# Patient Record
Sex: MALE | Race: WHITE | NOT HISPANIC OR LATINO | Employment: FULL TIME | URBAN - METROPOLITAN AREA
[De-identification: names, ages, dates, MRNs, and addresses within clinical notes are randomized per-mention and may not be internally consistent; named-entity substitution may affect disease eponyms.]

---

## 2017-01-23 ENCOUNTER — ALLSCRIPTS OFFICE VISIT (OUTPATIENT)
Dept: OTHER | Facility: OTHER | Age: 57
End: 2017-01-23

## 2017-02-28 ENCOUNTER — LAB CONVERSION - ENCOUNTER (OUTPATIENT)
Dept: OTHER | Facility: OTHER | Age: 57
End: 2017-02-28

## 2017-02-28 LAB
BILIRUB UR QL STRIP: NEGATIVE
COLOR UR: YELLOW
COMMENT (HISTORICAL): CLEAR
FECAL OCCULT BLOOD DIAGNOSTIC (HISTORICAL): NEGATIVE
GLUCOSE (HISTORICAL): NEGATIVE
HBA1C MFR BLD HPLC: 5.8 % OF TOTAL HGB
KETONES UR STRIP-MCNC: NEGATIVE MG/DL
LEUKOCYTE ESTERASE UR QL STRIP: NEGATIVE
NITRITE UR QL STRIP: NEGATIVE
PH UR STRIP.AUTO: 5.5 [PH] (ref 5–8)
PROT UR STRIP-MCNC: NEGATIVE MG/DL
SP GR UR STRIP.AUTO: 1.02 (ref 1–1.03)
TSH SERPL DL<=0.05 MIU/L-ACNC: 1.58 MIU/L (ref 0.4–4.5)

## 2017-05-17 ENCOUNTER — ALLSCRIPTS OFFICE VISIT (OUTPATIENT)
Dept: OTHER | Facility: OTHER | Age: 57
End: 2017-05-17

## 2017-06-02 DIAGNOSIS — E78.5 HYPERLIPIDEMIA: ICD-10-CM

## 2017-06-02 DIAGNOSIS — R05.9 COUGH: ICD-10-CM

## 2017-06-02 DIAGNOSIS — R73.09 OTHER ABNORMAL GLUCOSE: ICD-10-CM

## 2017-06-02 DIAGNOSIS — I25.10 ATHEROSCLEROTIC HEART DISEASE OF NATIVE CORONARY ARTERY WITHOUT ANGINA PECTORIS: ICD-10-CM

## 2017-06-05 ENCOUNTER — HOSPITAL ENCOUNTER (OUTPATIENT)
Dept: RADIOLOGY | Facility: MEDICAL CENTER | Age: 57
Discharge: HOME | End: 2017-06-05
Payer: COMMERCIAL

## 2017-06-05 ENCOUNTER — ALLSCRIPTS OFFICE VISIT (OUTPATIENT)
Dept: OTHER | Facility: OTHER | Age: 57
End: 2017-06-05

## 2017-06-05 DIAGNOSIS — R05.9 COUGH: ICD-10-CM

## 2017-06-05 PROCEDURE — 71020 HB CHEST X-RAY 2VW FRONTAL&LATL: CPT

## 2017-06-07 ENCOUNTER — GENERIC CONVERSION - ENCOUNTER (OUTPATIENT)
Dept: OTHER | Facility: OTHER | Age: 57
End: 2017-06-07

## 2017-06-26 ENCOUNTER — ALLSCRIPTS OFFICE VISIT (OUTPATIENT)
Dept: OTHER | Facility: OTHER | Age: 57
End: 2017-06-26

## 2017-08-07 ENCOUNTER — HOSPITAL ENCOUNTER (OUTPATIENT)
Dept: NON INVASIVE DIAGNOSTICS | Facility: CLINIC | Age: 57
Discharge: HOME | End: 2017-08-07
Payer: COMMERCIAL

## 2017-08-07 DIAGNOSIS — I25.10 ATHEROSCLEROTIC HEART DISEASE OF NATIVE CORONARY ARTERY WITHOUT ANGINA PECTORIS: ICD-10-CM

## 2017-08-07 DIAGNOSIS — R73.09 OTHER ABNORMAL GLUCOSE: ICD-10-CM

## 2017-08-07 PROCEDURE — 93350 STRESS TTE ONLY: CPT

## 2017-08-08 LAB
ARRHY DURING EX: NORMAL
CHEST PAIN STATEMENT: NORMAL
MAX DIASTOLIC BP: 80 MMHG
MAX HEART RATE: 141 BPM
MAX PREDICTED HEART RATE: 164 BPM
MAX. SYSTOLIC BP: 180 MMHG
PROTOCOL NAME: NORMAL
REASON FOR TERMINATION: NORMAL
TARGET HR FORMULA: NORMAL
TEST INDICATION: NORMAL
TIME IN EXERCISE PHASE: 353 S

## 2017-08-09 ENCOUNTER — TELEPHONE (OUTPATIENT)
Dept: INPATIENT UNIT | Facility: HOSPITAL | Age: 57
End: 2017-08-09

## 2017-08-09 PROBLEM — R07.9 CHEST PAIN: Chronic | Status: ACTIVE | Noted: 2017-08-09

## 2017-08-09 RX ORDER — ASPIRIN 81 MG/1
324 TABLET, CHEWABLE ORAL ONCE
Status: CANCELLED | OUTPATIENT
Start: 2017-08-09 | End: 2017-08-09

## 2017-08-09 RX ORDER — CLOPIDOGREL BISULFATE 75 MG/1
600 TABLET ORAL ONCE
Status: CANCELLED | OUTPATIENT
Start: 2017-08-09 | End: 2017-08-09

## 2017-08-09 RX ORDER — SODIUM CHLORIDE 9 MG/ML
125 INJECTION, SOLUTION INTRAVENOUS CONTINUOUS
Status: CANCELLED | OUTPATIENT
Start: 2017-08-09

## 2017-08-10 ENCOUNTER — TELEPHONE (OUTPATIENT)
Dept: NON INVASIVE DIAGNOSTICS | Facility: HOSPITAL | Age: 57
End: 2017-08-10

## 2017-08-10 ENCOUNTER — HOSPITAL ENCOUNTER (OUTPATIENT)
Dept: NON INVASIVE DIAGNOSTICS | Facility: HOSPITAL | Age: 57
Discharge: HOME | End: 2017-08-10
Attending: INTERNAL MEDICINE | Admitting: INTERNAL MEDICINE
Payer: COMMERCIAL

## 2017-08-10 VITALS
HEIGHT: 66 IN | SYSTOLIC BLOOD PRESSURE: 117 MMHG | BODY MASS INDEX: 30.53 KG/M2 | OXYGEN SATURATION: 97 % | RESPIRATION RATE: 18 BRPM | DIASTOLIC BLOOD PRESSURE: 63 MMHG | TEMPERATURE: 97.5 F | HEART RATE: 80 BPM | WEIGHT: 190 LBS

## 2017-08-10 DIAGNOSIS — I25.10 ATHEROSCLEROTIC HEART DISEASE OF NATIVE CORONARY ARTERY WITHOUT ANGINA PECTORIS: ICD-10-CM

## 2017-08-10 DIAGNOSIS — Z95.1 PRESENCE OF AORTOCORONARY BYPASS GRAFT: ICD-10-CM

## 2017-08-10 DIAGNOSIS — R94.39 OTHER NONSPECIFIC ABNORMAL CARDIOVASCULAR SYSTEM FUNCTION STUDY: ICD-10-CM

## 2017-08-10 DIAGNOSIS — I25.10 CAD IN NATIVE ARTERY: ICD-10-CM

## 2017-08-10 LAB
ANION GAP SERPL CALCULATED.3IONS-SCNC: 10 MMOL/L (ref 4–13)
ATRIAL RATE: 72 BPM
BUN SERPL-MCNC: 12 MG/DL (ref 5–25)
CALCIUM SERPL-MCNC: 9 MG/DL (ref 8.3–10.1)
CHLORIDE SERPL-SCNC: 106 MMOL/L (ref 100–108)
CHOLEST SERPL-MCNC: 191 MG/DL (ref 50–200)
CO2 SERPL-SCNC: 23 MMOL/L (ref 21–32)
CREAT SERPL-MCNC: 0.96 MG/DL (ref 0.6–1.3)
ERYTHROCYTE [DISTWIDTH] IN BLOOD BY AUTOMATED COUNT: 13.5 % (ref 11.6–15.1)
GFR SERPL CREATININE-BSD FRML MDRD: 88 ML/MIN/1.73SQ M
GLUCOSE P FAST SERPL-MCNC: 210 MG/DL (ref 65–99)
GLUCOSE SERPL-MCNC: 210 MG/DL (ref 65–140)
HCT VFR BLD AUTO: 39.9 % (ref 36.5–49.3)
HDLC SERPL-MCNC: 31 MG/DL (ref 40–60)
HGB BLD-MCNC: 14.1 G/DL (ref 12–17)
INR PPP: 1.03 (ref 0.86–1.16)
LDLC SERPL CALC-MCNC: 137 MG/DL (ref 0–100)
MAGNESIUM SERPL-MCNC: 1.9 MG/DL (ref 1.6–2.6)
MCH RBC QN AUTO: 28.7 PG (ref 26.8–34.3)
MCHC RBC AUTO-ENTMCNC: 35.3 G/DL (ref 31.4–37.4)
MCV RBC AUTO: 81 FL (ref 82–98)
P AXIS: 5 DEGREES
PLATELET # BLD AUTO: 245 THOUSANDS/UL (ref 149–390)
PMV BLD AUTO: 9.5 FL (ref 8.9–12.7)
POTASSIUM SERPL-SCNC: 4.2 MMOL/L (ref 3.5–5.3)
PR INTERVAL: 158 MS
PROTHROMBIN TIME: 13.5 SECONDS (ref 12.1–14.4)
QRS AXIS: 54 DEGREES
QRSD INTERVAL: 92 MS
QT INTERVAL: 420 MS
QTC INTERVAL: 459 MS
RBC # BLD AUTO: 4.91 MILLION/UL (ref 3.88–5.62)
SODIUM SERPL-SCNC: 139 MMOL/L (ref 136–145)
T WAVE AXIS: 101 DEGREES
TRIGL SERPL-MCNC: 114 MG/DL
VENTRICULAR RATE: 72 BPM
WBC # BLD AUTO: 6.8 THOUSAND/UL (ref 4.31–10.16)

## 2017-08-10 PROCEDURE — 99153 MOD SED SAME PHYS/QHP EA: CPT | Performed by: INTERNAL MEDICINE

## 2017-08-10 PROCEDURE — 83735 ASSAY OF MAGNESIUM: CPT | Performed by: INTERNAL MEDICINE

## 2017-08-10 PROCEDURE — C1894 INTRO/SHEATH, NON-LASER: HCPCS | Performed by: INTERNAL MEDICINE

## 2017-08-10 PROCEDURE — 80048 BASIC METABOLIC PNL TOTAL CA: CPT | Performed by: INTERNAL MEDICINE

## 2017-08-10 PROCEDURE — C1760 CLOSURE DEV, VASC: HCPCS | Performed by: INTERNAL MEDICINE

## 2017-08-10 PROCEDURE — 99152 MOD SED SAME PHYS/QHP 5/>YRS: CPT | Performed by: INTERNAL MEDICINE

## 2017-08-10 PROCEDURE — C1769 GUIDE WIRE: HCPCS | Performed by: INTERNAL MEDICINE

## 2017-08-10 PROCEDURE — 85027 COMPLETE CBC AUTOMATED: CPT | Performed by: INTERNAL MEDICINE

## 2017-08-10 PROCEDURE — 93005 ELECTROCARDIOGRAM TRACING: CPT | Performed by: INTERNAL MEDICINE

## 2017-08-10 PROCEDURE — 93455 CORONARY ART/GRFT ANGIO S&I: CPT | Performed by: INTERNAL MEDICINE

## 2017-08-10 PROCEDURE — 85610 PROTHROMBIN TIME: CPT | Performed by: INTERNAL MEDICINE

## 2017-08-10 PROCEDURE — 80061 LIPID PANEL: CPT | Performed by: INTERNAL MEDICINE

## 2017-08-10 RX ORDER — FENTANYL CITRATE 50 UG/ML
INJECTION, SOLUTION INTRAMUSCULAR; INTRAVENOUS CODE/TRAUMA/SEDATION MEDICATION
Status: COMPLETED | OUTPATIENT
Start: 2017-08-10 | End: 2017-08-10

## 2017-08-10 RX ORDER — SODIUM CHLORIDE 9 MG/ML
125 INJECTION, SOLUTION INTRAVENOUS CONTINUOUS
Status: DISCONTINUED | OUTPATIENT
Start: 2017-08-10 | End: 2017-08-10

## 2017-08-10 RX ORDER — CLOPIDOGREL BISULFATE 75 MG/1
600 TABLET ORAL ONCE
Status: COMPLETED | OUTPATIENT
Start: 2017-08-10 | End: 2017-08-10

## 2017-08-10 RX ORDER — LIDOCAINE HYDROCHLORIDE 10 MG/ML
INJECTION, SOLUTION INFILTRATION; PERINEURAL CODE/TRAUMA/SEDATION MEDICATION
Status: COMPLETED | OUTPATIENT
Start: 2017-08-10 | End: 2017-08-10

## 2017-08-10 RX ORDER — ISOSORBIDE MONONITRATE 30 MG/1
30 TABLET, EXTENDED RELEASE ORAL DAILY
COMMUNITY
End: 2018-09-24 | Stop reason: SDUPTHER

## 2017-08-10 RX ORDER — ASPIRIN 81 MG/1
81 TABLET, CHEWABLE ORAL DAILY
COMMUNITY

## 2017-08-10 RX ORDER — FENOFIBRATE 145 MG/1
145 TABLET, COATED ORAL DAILY
COMMUNITY
End: 2018-08-29 | Stop reason: SDUPTHER

## 2017-08-10 RX ORDER — METOPROLOL SUCCINATE 25 MG/1
25 TABLET, EXTENDED RELEASE ORAL DAILY
COMMUNITY
End: 2018-02-15

## 2017-08-10 RX ORDER — SODIUM CHLORIDE 9 MG/ML
125 INJECTION, SOLUTION INTRAVENOUS CONTINUOUS
Status: DISPENSED | OUTPATIENT
Start: 2017-08-10 | End: 2017-08-10

## 2017-08-10 RX ORDER — MIDAZOLAM HYDROCHLORIDE 1 MG/ML
INJECTION INTRAMUSCULAR; INTRAVENOUS CODE/TRAUMA/SEDATION MEDICATION
Status: COMPLETED | OUTPATIENT
Start: 2017-08-10 | End: 2017-08-10

## 2017-08-10 RX ORDER — ASPIRIN 81 MG/1
324 TABLET, CHEWABLE ORAL ONCE
Status: COMPLETED | OUTPATIENT
Start: 2017-08-10 | End: 2017-08-10

## 2017-08-10 RX ADMIN — FENTANYL CITRATE 50 MCG: 50 INJECTION, SOLUTION INTRAMUSCULAR; INTRAVENOUS at 12:49

## 2017-08-10 RX ADMIN — FENTANYL CITRATE 25 MCG: 50 INJECTION, SOLUTION INTRAMUSCULAR; INTRAVENOUS at 13:17

## 2017-08-10 RX ADMIN — MIDAZOLAM 2 MG: 1 INJECTION INTRAMUSCULAR; INTRAVENOUS at 12:49

## 2017-08-10 RX ADMIN — FENTANYL CITRATE 50 MCG: 50 INJECTION, SOLUTION INTRAMUSCULAR; INTRAVENOUS at 12:54

## 2017-08-10 RX ADMIN — MIDAZOLAM 2 MG: 1 INJECTION INTRAMUSCULAR; INTRAVENOUS at 12:44

## 2017-08-10 RX ADMIN — SODIUM CHLORIDE 125 ML/HR: 0.9 INJECTION, SOLUTION INTRAVENOUS at 09:07

## 2017-08-10 RX ADMIN — MIDAZOLAM 1 MG: 1 INJECTION INTRAMUSCULAR; INTRAVENOUS at 13:17

## 2017-08-10 RX ADMIN — FENTANYL CITRATE 50 MCG: 50 INJECTION, SOLUTION INTRAMUSCULAR; INTRAVENOUS at 12:44

## 2017-08-10 RX ADMIN — IOHEXOL 110 ML: 350 INJECTION, SOLUTION INTRAVENOUS at 13:35

## 2017-08-10 RX ADMIN — CLOPIDOGREL BISULFATE 600 MG: 75 TABLET ORAL at 09:39

## 2017-08-10 RX ADMIN — ASPIRIN 81 MG 324 MG: 81 TABLET ORAL at 09:39

## 2017-08-10 RX ADMIN — LIDOCAINE HYDROCHLORIDE 10 ML: 10 INJECTION, SOLUTION INFILTRATION; PERINEURAL at 12:51

## 2017-08-10 RX ADMIN — MIDAZOLAM 2 MG: 1 INJECTION INTRAMUSCULAR; INTRAVENOUS at 12:54

## 2017-08-10 RX ADMIN — FENTANYL CITRATE 50 MCG: 50 INJECTION, SOLUTION INTRAMUSCULAR; INTRAVENOUS at 13:30

## 2017-08-14 ENCOUNTER — ALLSCRIPTS OFFICE VISIT (OUTPATIENT)
Dept: OTHER | Facility: OTHER | Age: 57
End: 2017-08-14

## 2017-08-21 ENCOUNTER — ALLSCRIPTS OFFICE VISIT (OUTPATIENT)
Dept: OTHER | Facility: OTHER | Age: 57
End: 2017-08-21

## 2017-08-29 ENCOUNTER — GENERIC CONVERSION - ENCOUNTER (OUTPATIENT)
Dept: OTHER | Facility: OTHER | Age: 57
End: 2017-08-29

## 2017-09-11 ENCOUNTER — GENERIC CONVERSION - ENCOUNTER (OUTPATIENT)
Dept: OTHER | Facility: OTHER | Age: 57
End: 2017-09-11

## 2017-10-02 DIAGNOSIS — Z12.5 ENCOUNTER FOR SCREENING FOR MALIGNANT NEOPLASM OF PROSTATE: ICD-10-CM

## 2017-10-06 ENCOUNTER — ALLSCRIPTS OFFICE VISIT (OUTPATIENT)
Dept: OTHER | Facility: OTHER | Age: 57
End: 2017-10-06

## 2017-10-07 NOTE — PROGRESS NOTES
Assessment  1  Anxiety (300 00) (F41 9)  2  Depression, major, recurrent, mild (296 31) (F33 0)  3  Insomnia (780 52) (G47 00)    Plan   Anxiety    · Start: LORazepam 0 5 MG Oral Tablet; take 1 tablet daily as needed  Insomnia    · Start: TraZODone HCl - 50 MG Oral Tablet; TAKE 1/2 TABLET AT BEDTIME INCREASE TO  1 TABLET AS NEEDED    Follow-up visit in 6 weeks Evaluation and Treatment  Follow-up  Status: Hold For - Scheduling  Requested for: 02FTS1758  Ordered; For: Insomnia;  Ordered By: Tatiana Morrissey  Performed:   Due: 17VMD1703     Discussion/Summary    Aniety not at [address]  pt to continue escitalopram 10 mg will add prn lorazapam 0 5 mg prn  pt has insomnia will add trazadone  Possible side effects of new medications were reviewed with the patient/guardian today  The treatment plan was reviewed with the patient/guardian  The patient/guardian understands and agrees with the treatment plan      Chief Complaint  follow up for depression/anxiety      History of Present Illness  pt presents for follow up anxiety and depression  pt was started on escitalopram last visit  pt stes he noticed little difference  pt has medical problems  pt has financial woes  pt slo has insomnia  pt works midnight shift so his sleep is disrupted  pt declines vaccines today  pt did not do labs  pt staes cardiology prescribed lorazapam prn but he did not get it  discussed adding trazadone hs to hyelp sleep nad depression  we can alos use lorazapam prm  The patient is being seen for follow-up of generalized anxiety disorder  The patient reports no change in the condition  Comorbid Illnesses: depression  He has had no significant interval events  Interval symptoms:  stable anxiety,-denies panic attacks,-worsened sleep disruption-and-improved depression  Associated symptoms: no hallucinations-and-no suicidal ideation  Medication(s): selective serotonin reuptake inhibitors     Medications:  the patient is adherent to his medication regimen, but-he denies medication side effects  Review of Systems    Constitutional: recent 4 lb weight gain  Cardiovascular: no chest pain-and-no extremity edema  Respiratory: no shortness of breath  Psychiatric: anxiety-and-sleep disturbances, but-not suicidal       Active Problems  1  Abnormal glucose (790 29) (R73 09)  2  Abnormal stress test (794 39) (R94 39)  3  Anxiety (300 00) (F41 9)  4  Back pain (724 5) (M54 9)  5  CAD in native artery (414 01) (I25 10)  6  Depression, major, recurrent, mild (296 31) (F33 0)  7  Encounter for prostate cancer screening (V76 44) (Z12 5)  8  Encounter for screening colonoscopy (V76 51) (Z12 11)  9  Esophageal reflux (530 81) (K21 9)  10  Hip pain, chronic, right (719 45,338 29) (M25 551,G89 29)  11  Hyperesthesia (782 0) (R20 3)  12  Hyperlipidemia (272 4) (E78 5)  13  Insomnia (780 52) (G47 00)  14  Keloid scar (701 4) (L91 0)  15  Multiple nevi (216 9) (D22 9)  16  Neck pain (723 1) (M54 2)  17  Obesity (278 00) (E66 9)  18  Restless legs syndrome (333 94) (G25 81)  19  S/P CABG (coronary artery bypass graft) (V45 81) (Z95 1)  20  Screening for skin condition (V82 0) (Z13 89)  21  Seborrheic keratosis (702 19) (L82 1)  22  Shoulder joint pain, unspecified laterality    Past Medical History  1  History of Anxiety (300 00) (F41 9)  2  History of Chest pain (786 50) (R07 9)  3  History of Depression (311) (F32 9)  4  Family history of acute myocardial infarction (V17 3) (Z82 49)  5  Family history of coronary artery disease (V17 3) (Z82 49)  6  History of nephrolithiasis (V13 01) (Z87 442)  7  History of rotator cuff tear (V13 59) (Z87 39)  8  Denied: History of substance abuse  9  History of urinary stone (V13 01) (Z87 442)  10  History of Subdural hemorrhage-concussion (852 29) (G38 0Z0I)    The active problems and past medical history were reviewed and updated today  Surgical History  1  History of Arthroscopy Shoulder  2  History of Brain Surgery  3  History of CABG  4  History of Cardiac Cath Procedure Outcome: Successful  5  History of Hernia Repair  6  History of Inguinal Hernia Repair  7  History of Surgery Vas Deferens Vasectomy  8  History of Tonsillectomy    Family History  Mother   1  Family history of depression (V17 0) (Z81 8)  2  Family history of diabetes mellitus (V18 0) (Z83 3)  3  Denied: Family history of substance abuse  Father   4  Family history of other lymphatic and hematopoietic neoplasms (V16 7) (Z80 7)  5  Family history of stroke (V17 1) (Z82 3)  Family History   6  Family history of Cancer  7  Family history of arthritis (V17 7) (Z82 61)  8  Family history of hypertension (V17 49) (Z82 49)    Social History   · Being A Social Drinker   · Caffeine Use   · Former smoker (A84 60) (S46 773)  The social history was reviewed and updated today  The social history was reviewed and is unchanged  Current Meds  1  AmLODIPine Besylate 5 MG Oral Tablet; TAKE 1 TABLET DAILY; Therapy: 14EEX4827 to (Evaluate:46Hxp2034)  Requested for: 40ZWS2470; Last   Rx:85Uio7677 Ordered  2  Aspirin 81 MG TABS; Take 1 tablet daily; Therapy: (Recorded:08Apr2016) to Recorded  3  Clopidogrel Bisulfate 75 MG Oral Tablet; TAKE 1 TABLET DAILY; Therapy: 76OLO4841 to (Evaluate:76Ntg8703)  Requested for: 43Sds1622; Last   Rx:14Aug2017 Ordered  4  CoQ-10 & Fish Oil CAPS; Therapy: (Recorded:08Apr2016) to Recorded  5  Escitalopram Oxalate 10 MG Oral Tablet; TAKE 1 TABLET BY MOUTH EVERY DAY; Therapy: 45Djz9972 to (Becki Never)  Requested for: 88Dbn0111; Last   Rx:19Wnb4016 Ordered  6  Fenofibrate 145 MG Oral Tablet; take 1 tablet every day; Therapy: 80Bhp7263 to (Evaluate:30Nov2017)  Requested for: 27Hof8747; Last   Rx:42Wmq2862 Ordered  7  Fish Oil CAPS; TAKE 1 CAPSULE Daily @ 8am Recorded  8  Isosorbide Mononitrate ER 30 MG Oral Tablet Extended Release 24 Hour; Take 1 tablet   once daily;    Therapy: 38YJS2820 to (Evaluate:09Hyq2010)  Requested for: 43GXZ7862; Last   Rx:90Cfg0758 Ordered  9  Metoprolol Succinate  MG Oral Tablet Extended Release 24 Hour; Take 1 tablet   daily; Therapy: 40GQW0489 to (Last Rx:08Edz9358)  Requested for: 14Aug2017 Ordered  10  Nitroglycerin 0 4 MG Sublingual Tablet Sublingual; TAKE AS DIRECTED; Therapy: 90ZFN5784 to (Last Rx:55Dza5334)  Requested for: 96Nuz5695 Ordered  11  Pravastatin Sodium 20 MG Oral Tablet; take 1 tablet every day; Therapy: 22VMP4312 to (Last Rx:16Mef3058)  Requested for: 14Aug2017 Ordered  12  Vitamin B Complex Oral Tablet; Therapy: (Recorded:80Wlq1336) to Recorded  13  Vitamin E 400 UNIT Oral Capsule; Therapy: (Recorded:46Ydb9215) to Recorded    The medication list was reviewed and updated today  Allergies  1  Contrast Media Ready-Box MISC  2  Meloxicam TABS    Vitals  Vital Signs    Recorded: 16YQP9741 08:09AM   Temperature 97 3 F, Tympanic   Heart Rate 53, L Brachial Artery   Pulse Quality Normal, L Brachial Artery   Respiration Quality Normal   Respiration 16   Systolic 261, LUE, Sitting   Diastolic 82, LUE, Sitting   Height 5 ft 5 5 in   Weight 192 lb 3 2 oz   BMI Calculated 31 5   BSA Calculated 1 96   O2 Saturation 97     Physical Exam    Constitutional   General appearance: No acute distress, well appearing and well nourished  appears healthy-and-obese  Head and Face   Head and face: Normal     Eyes   Conjunctiva and lids: No erythema, swelling or discharge  Ears, Nose, Mouth, and Throat   External inspection of ears and nose: Normal     Oropharynx: Normal with no erythema, edema, exudate or lesions  Neck   Neck: Supple, symmetric, trachea midline, no masses  Pulmonary   Respiratory effort: No increased work of breathing or signs of respiratory distress  Auscultation of lungs: Clear to auscultation  Cardiovascular   Auscultation of heart: Normal rate and rhythm, normal S1 and S2, no murmurs      Examination of extremities for edema and/or varicosities: Normal  Lymphatic   Palpation of lymph nodes in neck: No lymphadenopathy      Psychiatric   Judgment and insight: Normal     Mood and affect: Normal        Future Appointments    Date/Time Provider Specialty Site   11/17/2017 08:30 AM Miguel Rothman [facility]     Signatures   Electronically signed by : Sarah Bliss [facility]; Oct  6 2017  8:30AM EST                       (Author)    Electronically signed by : Shey Campuzano DO; Oct  6 2017 12:28PM EST                       (Author)    Electronically signed by : Shey Campuzano DO; Oct  6 2017 12:29PM EST                       (Author)

## 2017-10-13 ENCOUNTER — GENERIC CONVERSION - ENCOUNTER (OUTPATIENT)
Dept: OTHER | Facility: OTHER | Age: 57
End: 2017-10-13

## 2017-10-13 ENCOUNTER — LAB CONVERSION - ENCOUNTER (OUTPATIENT)
Dept: OTHER | Facility: OTHER | Age: 57
End: 2017-10-13

## 2017-10-13 LAB
A/G RATIO (HISTORICAL): 2 (CALC) (ref 1–2.5)
ALBUMIN SERPL BCP-MCNC: 4.5 G/DL (ref 3.6–5.1)
ALP SERPL-CCNC: 49 U/L (ref 40–115)
ALT SERPL W P-5'-P-CCNC: 22 U/L (ref 9–46)
AST SERPL W P-5'-P-CCNC: 26 U/L (ref 10–35)
BILIRUB SERPL-MCNC: 0.9 MG/DL (ref 0.2–1.2)
BUN SERPL-MCNC: 18 MG/DL (ref 7–25)
BUN/CREA RATIO (HISTORICAL): NORMAL (CALC) (ref 6–22)
CALCIUM SERPL-MCNC: 9.6 MG/DL (ref 8.6–10.3)
CHLORIDE SERPL-SCNC: 105 MMOL/L (ref 98–110)
CHOLEST SERPL-MCNC: 133 MG/DL
CHOLEST/HDLC SERPL: 11.1 (CALC)
CO2 SERPL-SCNC: 26 MMOL/L (ref 20–31)
CREAT SERPL-MCNC: 0.89 MG/DL (ref 0.7–1.33)
EGFR AFRICAN AMERICAN (HISTORICAL): 111 ML/MIN/1.73M2
EGFR-AMERICAN CALC (HISTORICAL): 96 ML/MIN/1.73M2
GAMMA GLOBULIN (HISTORICAL): 2.3 G/DL (CALC) (ref 1.9–3.7)
GLUCOSE (HISTORICAL): 94 MG/DL (ref 65–99)
HBA1C MFR BLD HPLC: 5.5 % OF TOTAL HGB
HDLC SERPL-MCNC: 12 MG/DL
LDL CHOLESTEROL (HISTORICAL): 90 MG/DL (CALC)
NON-HDL-CHOL (CHOL-HDL) (HISTORICAL): 121 MG/DL (CALC)
POTASSIUM SERPL-SCNC: 4 MMOL/L (ref 3.5–5.3)
PROSTATE SPECIFIC ANTIGEN TOTAL (HISTORICAL): 0.2 NG/ML
SODIUM SERPL-SCNC: 140 MMOL/L (ref 135–146)
TOTAL PROTEIN (HISTORICAL): 6.8 G/DL (ref 6.1–8.1)
TRIGL SERPL-MCNC: 226 MG/DL

## 2017-11-17 ENCOUNTER — ALLSCRIPTS OFFICE VISIT (OUTPATIENT)
Dept: OTHER | Facility: OTHER | Age: 57
End: 2017-11-17

## 2017-11-18 NOTE — PROGRESS NOTES
Assessment    1  CAD in native artery (414 01) (I25 10)  2  Anxiety (300 00) (F41 9)  3  Depression, major, recurrent, mild (296 31) (F33 0)  4  Insomnia (780 52) (G47 00)  5  Hyperlipidemia (272 4) (E78 5)  6  Low HDL (under 40) (272 5) (E78 6)    Plan  CAD in native artery    · Renew: AmLODIPine Besylate 5 MG Oral Tablet; TAKE 1 TABLET DAILY  CAD in native artery, Hyperlipidemia    · Begin or continue regular aerobic exercise  Gradually work up to at least {count1}sessions of {dur1} of exercise a week ; Status:Complete;   Done: 43VWB1690   · Eat a low fat and low cholesterol diet ; Status:Complete;   Done: 41JDZ9351   · We recommend that you bring your body mass index down to 26 ; Status:Complete;  Done: 17VWD1002   · Follow-up visit in 3 months Evaluation and Treatment  Follow-up  Status: Complete Done: 11UOM8267  Flu vaccine need    · Administered: Fluzone Quadrivalent 0 5 ML Intramuscular Suspension Prefilled Syringe  Hyperlipidemia    · Renew: Pravastatin Sodium 20 MG Oral Tablet (Pravachol); take 1 tablet every day  Insomnia    · Renew: TraZODone HCl - 50 MG Oral Tablet; TAKE 1/2 TABLET BY MOUTH AT BEDTIME,INCREASE TO 1 TABLET AS NEEDED  Need for pneumococcal vaccination    · Administered: Pneumovax 23 25 MCG/0 5ML Injection Injectable    Discussion/Summary    Hypertension at goal lipids not at goal patient has low HDL of 12 triglycerides elevated at 226 patient is on statin and fibrate patient to add niacin non flush formula over-the-counter 1-2 per day patient to follow up with cardiologist for known coronary artery disease  Patient has not had any angina  Depression greatly improved with the addition of trazodone at bedtime patient rarely has to use lorazepam for anxiety patient to continue escitalopram  Flu vaccine and Pneumovax 23 given today  Possible side effects of new medications were reviewed with the patient/guardian today  The treatment plan was reviewed with the patient/guardian   The patient/guardian understands and agrees with the treatment plan      Chief Complaint  follow up med check   Patient is here today for follow up of chronic conditions described in HPI  History of Present Illness  pt presents for follow up chronic conditons  pt has cad  he is comanaged with cardiology  pt had cabg and stents  pt on multiple meds  pt has depression recurrent  he is on escitalopram and prn lorazaopam  pt stes the trazadone added lst vist has helped immensely  pt sleeping well on trazadone 25 mg hs  pt rarely takes lorazapam  labs reviewed with pt  psa is normal  cmp nornmal  hba1c is 5 5 lipds- hdl is 12 triglycerides a re down to 226  The patient states his depression has improved since the last visit  They have had recurrent episodes of major depression  He has no comorbid illnesses  He has had no significant interval events  Interval Symptoms: improved depression,-- improved depressed mood,-- improved loss of interest or pleasure in activities,-- improved insomnia,-- denies excessive sleepiness,-- denies inability to perform normal activities-- and-- denies anxiety  Associated symptoms include: employment difficulties, but-- no thoughts of suicide,-- no social difficulties,-- no auditory hallucinations-- and-- no visual hallucinations  Medications: Medication(s): a SSRI-- and-- trazadone  Review of Systems   Constitutional: no recent weight gain  Eyes: no eyesight problems  Cardiovascular: no chest pain-- and-- no extremity edema  Respiratory: no cough  Gastrointestinal: no abdominal pain,-- no constipation-- and-- no diarrhea  Genitourinary: no dysuria  Integumentary: no rashes  Neurological: no headache  Active Problems  1  Abnormal stress test (794 39) (R94 39)  2  Anxiety (300 00) (F41 9)  3  Back pain (724 5) (M54 9)  4  CAD in native artery (414 01) (I25 10)  5  Depression, major, recurrent, mild (296 31) (F33 0)  6   Encounter for prostate cancer screening (Y94 59) (Z12 5)  7  Encounter for screening colonoscopy (V76 51) (Z12 11)  8  Esophageal reflux (530 81) (K21 9)  9  Hip pain, chronic, right (719 45,338 29) (M25 551,G89 29)  10  Hyperesthesia (782 0) (R20 3)  11  Hyperlipidemia (272 4) (E78 5)  12  Insomnia (780 52) (G47 00)  13  Keloid scar (701 4) (L91 0)  14  Multiple nevi (216 9) (D22 9)  15  Neck pain (723 1) (M54 2)  16  Obesity (278 00) (E66 9)  17  Obstructive sleep apnea (327 23) (G47 33)  18  Restless legs syndrome (333 94) (G25 81)  19  S/P CABG (coronary artery bypass graft) (V45 81) (Z95 1)  20  Screening for skin condition (V82 0) (Z13 89)  21  Seborrheic keratosis (702 19) (L82 1)  22  Shoulder joint pain, unspecified laterality    Past Medical History  1  History of Anxiety (300 00) (F41 9)  2  History of Chest pain (786 50) (R07 9)  3  History of Depression (311) (F32 9)  4  Family history of acute myocardial infarction (V17 3) (Z82 49)  5  Family history of coronary artery disease (V17 3) (Z82 49)  6  History of nephrolithiasis (V13 01) (Z87 442)  7  History of rotator cuff tear (V13 59) (Z87 39)  8  Denied: History of substance abuse  9  History of urinary stone (V13 01) (Z87 442)  10  History of Subdural hemorrhage-concussion (852 29) (C91 3V7D)    The active problems and past medical history were reviewed and updated today  Surgical History  1  History of Arthroscopy Shoulder  2  History of Brain Surgery  3  History of CABG  4  History of Cardiac Cath Procedure Outcome: Successful  5  History of Hernia Repair  6  History of Inguinal Hernia Repair  7  History of Surgery Vas Deferens Vasectomy  8  History of Tonsillectomy    Family History  Mother   1  Family history of depression (V17 0) (Z81 8)  2  Family history of diabetes mellitus (V18 0) (Z83 3)  3  Denied: Family history of substance abuse  Father   4  Family history of other lymphatic and hematopoietic neoplasms (V16 7) (Z80 7)  5   Family history of stroke (V17 1) (Z82 3)  Family History 6  Family history of Cancer  7  Family history of arthritis (V17 7) (Z82 61)  8  Family history of hypertension (V17 49) (Z82 49)    Social History     · Being A Social Drinker   · Caffeine Use   · Former smoker (D71 25) (T20 432)  The social history was reviewed and updated today  The social history was reviewed and is unchanged  Current Meds  1  AmLODIPine Besylate 5 MG Oral Tablet; TAKE 1 TABLET DAILY; Therapy: 46KTP5798 to (Evaluate:31Xvp6532)  Requested for: 51GMX7477; Last Rx:73Scg1414 Ordered  2  Aspirin 81 MG TABS; Take 1 tablet daily; Therapy: (Recorded:08Apr2016) to Recorded  3  Clopidogrel Bisulfate 75 MG Oral Tablet; TAKE 1 TABLET DAILY; Therapy: 04JCA7438 to (Skipper Pro)  Requested for: 31SKA5290; Last Rx:09Nov2017 Ordered  4  CoQ-10 & Fish Oil CAPS; Therapy: (Recorded:08Apr2016) to Recorded  5  Escitalopram Oxalate 10 MG Oral Tablet; TAKE 1 TABLET BY MOUTH EVERY DAY; Therapy: 29Stk7924 to (Sebastian Boston)  Requested for: 59AMY7878; Last Rx:07Nov2017 Ordered  6  Fenofibrate 145 MG Oral Tablet; Take 1 tablet daily; Therapy: 44Jdj9515 to (Gerry Charlton)  Requested for: 38NEJ3473; Last Rx:11Oct2017 Ordered  7  Fish Oil CAPS; TAKE 1 CAPSULE Daily @ 8am Recorded  8  Isosorbide Mononitrate ER 30 MG Oral Tablet Extended Release 24 Hour; Take 1 tablet once daily; Therapy: 60MPM5932 to (Evaluate:96Lmj4582)  Requested for: 35JWF3275; Last Rx:50Xve8019 Ordered  9  LORazepam 0 5 MG Oral Tablet; take 1 tablet daily as needed; Therapy: 16NDD1482 to (Renew:05Nov2017); Last Rx:06Oct2017 Ordered  10  Metoprolol Succinate  MG Oral Tablet Extended Release 24 Hour; Take 1 tablet  daily; Therapy: 45CCO0755 to (Last Rx:65Alx2186)  Requested for: 41Ice5014 Ordered  11  Niacin 250 MG Oral Tablet; TAKE 2 TABLET Daily; Therapy: 62CFY3052 to (Evaluate:50Phb6276) Recorded  12  Nitroglycerin 0 4 MG Sublingual Tablet Sublingual; TAKE AS DIRECTED;   Therapy: 74LYL1672 to (Last Rx:16Uja4253)  Requested for: 42DMH9139 Ordered  13  Pravastatin Sodium 20 MG Oral Tablet; take 1 tablet every day; Therapy: 22VVF7598 to (Last Rx:09Nov2017)  Requested for: 27DOP3400 Ordered  14  TraZODone HCl - 50 MG Oral Tablet; TAKE 1/2 TABLET AT BEDTIME INCREASE TO 1  TABLET AS NEEDED; Therapy: 56SVM5553 to (Last Rx:06Oct2017)  Requested for: 45WPF8569 Ordered  15  Vitamin B Complex Oral Tablet; Therapy: (Recorded:02Kge6885) to Recorded  16  Vitamin E 400 UNIT Oral Capsule; Therapy: (Recorded:08Apr2016) to Recorded    The medication list was reviewed and updated today  Allergies  1  Contrast Media Ready-Box MISC  2  Meloxicam TABS    Vitals  Vital Signs    Recorded: 73CTK7738 08:27AM   Temperature 97 8 F, Tympanic   Heart Rate 55, L Brachial Artery   Pulse Quality Normal, L Brachial Artery   Respiration Quality Normal   Respiration 16   Systolic 593, LUE, Sitting   Diastolic 82, LUE, Sitting   Height 5 ft 5 5 in   Weight 196 lb    BMI Calculated 32 12   BSA Calculated 1 97   O2 Saturation 98       Physical Exam   Constitutional  General appearance: No acute distress, well appearing and well nourished  Head and Face  Head and face: Normal    Eyes  Conjunctiva and lids: No erythema, swelling or discharge  Pupils and irises: Equal, round, reactive to light  Ears, Nose, Mouth, and Throat  External inspection of ears and nose: Normal    Otoscopic examination: Tympanic membranes translucent with normal light reflex  Canals patent without erythema  Oropharynx: Normal with no erythema, edema, exudate or lesions  Neck  Neck: Supple, symmetric, trachea midline, no masses  Thyroid: Normal, no thyromegaly  Pulmonary  Respiratory effort: No increased work of breathing or signs of respiratory distress  Auscultation of lungs: Clear to auscultation  Cardiovascular  Auscultation of heart: Normal rate and rhythm, normal S1 and S2, no murmurs  Carotid pulses: 2+ bilaterally     Examination of extremities for edema and/or varicosities: Normal    Lymphatic  Palpation of lymph nodes in neck: No lymphadenopathy  Musculoskeletal  Inspection/palpation of digits and nails: Normal without clubbing or cyanosis  Examination of the extremities revealed no fingernail clubbing-- and-- well perfused fingers  Skin  Skin and subcutaneous tissue: Normal without rashes or lesions     Psychiatric  Judgment and insight: Normal    Mood and affect: Normal        Future Appointments    Date/Time Provider Specialty Site   02/19/2018 10:00 AM Debbie Velasquez [facility] [address]   Electronically signed by : Anna Sykes [facility]; Nov 17 2017  9:02AM EST                       (Author)    Electronically signed by : Otis Gonzalez DO; Nov 17 2017 12:33PM EST                       (Author)    Electronically signed by : Otis Gonzalez DO; Nov 17 2017 12:33PM EST                       (Author)

## 2018-01-10 NOTE — MISCELLANEOUS
Message  checked [facility] site 6/5/17      Signatures   Electronically signed by : Joseph Peres DO; Jun 7 2017  1:48PM EST                       (Author)

## 2018-01-11 NOTE — RESULT NOTES
Verified Results  (1) PSA (SCREEN) (Dx V76 44 Screen for Prostate Cancer) 02NRQ6256 08:41AM Carlos Ramirez   REPORT COMMENT:  FASTING:YES     Test Name Result Flag Reference   PSA, TOTAL 0 2 ng/mL  < OR = 4 0   The total PSA value from this assay system is   standardized against the WHO standard  The test   result will be approximately 20% lower when compared   to the equimolar-standardized total PSA ([place]   [place])  Comparison of serial PSA results should be   interpreted with this fact in mind  This test was performed using the Siemens   chemiluminescent method  Values obtained from   different assay methods cannot be used  interchangeably  PSA levels, regardless of  value, should not be interpreted as absolute  evidence of the presence or absence of disease

## 2018-01-12 VITALS
HEIGHT: 66 IN | BODY MASS INDEX: 30.89 KG/M2 | SYSTOLIC BLOOD PRESSURE: 120 MMHG | OXYGEN SATURATION: 97 % | RESPIRATION RATE: 16 BRPM | TEMPERATURE: 97.3 F | WEIGHT: 192.2 LBS | HEART RATE: 53 BPM | DIASTOLIC BLOOD PRESSURE: 82 MMHG

## 2018-01-12 VITALS
BODY MASS INDEX: 30.53 KG/M2 | HEIGHT: 66 IN | WEIGHT: 190 LBS | DIASTOLIC BLOOD PRESSURE: 82 MMHG | HEART RATE: 68 BPM | SYSTOLIC BLOOD PRESSURE: 114 MMHG

## 2018-01-12 VITALS
SYSTOLIC BLOOD PRESSURE: 108 MMHG | HEART RATE: 67 BPM | BODY MASS INDEX: 31.52 KG/M2 | RESPIRATION RATE: 16 BRPM | OXYGEN SATURATION: 97 % | TEMPERATURE: 98.4 F | HEIGHT: 66 IN | WEIGHT: 196.13 LBS | DIASTOLIC BLOOD PRESSURE: 82 MMHG

## 2018-01-13 VITALS
OXYGEN SATURATION: 96 % | RESPIRATION RATE: 16 BRPM | HEIGHT: 66 IN | BODY MASS INDEX: 31.36 KG/M2 | SYSTOLIC BLOOD PRESSURE: 152 MMHG | DIASTOLIC BLOOD PRESSURE: 94 MMHG | TEMPERATURE: 101.4 F | HEART RATE: 102 BPM | WEIGHT: 195.13 LBS

## 2018-01-13 VITALS
HEART RATE: 63 BPM | SYSTOLIC BLOOD PRESSURE: 130 MMHG | DIASTOLIC BLOOD PRESSURE: 82 MMHG | RESPIRATION RATE: 16 BRPM | WEIGHT: 193.2 LBS | TEMPERATURE: 97.7 F | OXYGEN SATURATION: 97 % | HEIGHT: 66 IN | BODY MASS INDEX: 31.05 KG/M2

## 2018-01-13 VITALS
HEART RATE: 60 BPM | SYSTOLIC BLOOD PRESSURE: 118 MMHG | WEIGHT: 195.44 LBS | DIASTOLIC BLOOD PRESSURE: 80 MMHG | HEIGHT: 66 IN | BODY MASS INDEX: 31.41 KG/M2

## 2018-01-14 VITALS
BODY MASS INDEX: 32.18 KG/M2 | DIASTOLIC BLOOD PRESSURE: 68 MMHG | WEIGHT: 200.25 LBS | SYSTOLIC BLOOD PRESSURE: 118 MMHG | HEART RATE: 58 BPM | HEIGHT: 66 IN

## 2018-01-14 VITALS
DIASTOLIC BLOOD PRESSURE: 82 MMHG | WEIGHT: 196 LBS | OXYGEN SATURATION: 98 % | SYSTOLIC BLOOD PRESSURE: 124 MMHG | HEART RATE: 55 BPM | HEIGHT: 66 IN | TEMPERATURE: 97.8 F | BODY MASS INDEX: 31.5 KG/M2 | RESPIRATION RATE: 16 BRPM

## 2018-01-16 NOTE — RESULT NOTES
Verified Results  CARDIAC CATHETERIZATION 2017 08:23AM Dejon Gauthier     Test Name Result Flag Reference   CARDIAC CATHETERIZATION (Report)     [address]   [address] [place] [address]   [phone]     [facility]     Invasive Cardiovascular Lab Complete Report     Patient: Flori Salinas   MR number: [id number]   Account number: [de-identified]   Study date: 08/10/2017   Gender: Male   : 1960   Height: 66 1 in   Weight: 189 6 lb   BSA: 1 96 m squared     Allergies: IODINATED DIAGNOSTIC AGENTS, MELOXICAM     Diagnostic Cardiologist: Joice Kawasaki, MD   Primary Physician: Gege Galdamez     SUMMARY     CORONARY CIRCULATION:   Left main: Normal    LAD: There was a 100 % proximal stenosis  Circumflex: The vessel was medium sized and gave rise to two OM branches  OM1 is occluded  OM2 is patent  Ramus intermedius: The vessel was normal sized  There was a 90% stenosis in mid vessel  The SVG to this vessel is no longer patent  RCA: There was a 100 % proximal stenosis  The vein graft to this vessel is no longer patent  Graft to the LAD: The graft was a LIMA  The body of the graft and the anastomosis were patent  The mid and distal LAD filled well and were free of obstructive disease  Graft to the ramus intermedius: The graft was a sequential vein graft from the ascending aorta  The proximal limb is occluded at the aorta  The continuation of the SVG from the ramus to 0M1 is patent, as outlined below  Graft to the 2nd obtuse marginal: The graft was a sequential vein graft from the ascending aorta to the ramus, continuing to OM1  The proximal graft, from the aorta to the ramus, is occluded  The ramus is severely diseased but patent;   consequently, blood flow from the ramus to OM1 is intact  OM1 fills well from the distal graft  However, if the severely diseased ramus becomes occluded, OM1 will also lose flow     Graft to the RCA: The graft was a saphenous vein graft from the ascending aorta  There was a 100 % stenosis at the proximal anastomosis  REPORT ELEMENT SELECTION:   Right femoral access was employed  INDICATIONS:   -- Possible CAD: stable angina, CCS class III  -- Coronary artery disease: abnormal stress test      PROCEDURES PERFORMED     -- Left coronary angiography  -- Right coronary angiography  -- Saphenous vein graft angiography  -- Saphenous vein graft angiography  -- LIMA graft angiography  -- Outpatient  -- Mod Sedation Same Physician Initial 15min  -- Coronary Catheterization (w/o LHC, w/ Grafts  -- Mod Sedation Same Physician Add 15min  -- Mod Sedation Same Physician Add 15min  PROCEDURE: The risks and alternatives of the procedures and conscious sedation were explained to the patient and informed consent was obtained  The patient was brought to the cath lab and placed on the table  The planned puncture sites   were prepped and draped in the usual sterile fashion  -- Right femoral artery access  The puncture site was infiltrated with local anesthetic  The vessel was accessed using the modified Seldinger technique, a wire was advanced into the vessel, and a sheath was advanced over the wire into the   vessel  -- Left coronary artery angiography  A catheter was advanced over a guidewire into the aorta and positioned in the left coronary artery ostium under fluoroscopic guidance  Angiography was performed  -- Right coronary artery angiography  A catheter was advanced over a guidewire into the aorta and positioned in the right coronary artery ostium under fluoroscopic guidance  Angiography was performed  -- Saphenous vein graft angiography  A catheter was advanced to the aorta and positioned at the aortic anastomosis of the graft over a guidewire under fluoroscopic guidance  Angiography was performed  -- Saphenous vein graft angiography   A catheter was advanced to the aorta and positioned at the aortic anastomosis of the graft over a guidewire under fluoroscopic guidance  Angiography was performed  -- Left internal mammary graft angiography  A catheter was advanced to the aorta and positioned in the left subclavian artery over a guidewire under fluoroscopic guidance  Angiography was performed  -- Outpatient  -- Mod Sedation Same Physician Initial 15min  -- Coronary Catheterization (w/o LHC, w/ Grafts  -- Mod Sedation Same Physician Add 15min  -- Mod Sedation Same Physician Add 15min  PROCEDURE COMPLETION: The patient tolerated the procedure well and was discharged from the cath lab  TIMING: Test started at 12:48  Test concluded at 13:38  HEMOSTASIS: The sheath was removed over a wire and the Angioseal delivery sheath   was inserted into the femoral artery  Hemostasis was obtained using a closure device ( Angioseal) deployed through the delivery sheath  MEDICATIONS GIVEN: Fentanyl (1OOmcg/2 ml), 50 mcg, IV, at 12:45  Versed (2mg/2ml), 2 mg, IV, at 12:45  Versed (2mg/2ml), 2 mg, IV, at 12:49  Fentanyl (1OOmcg/2 ml), 50 mcg, IV, at 12:49  1% Lidocaine, 10 ml, subcutaneously, at 12:52  Versed (2mg/2ml), 2 mg, IV, at 12:54  Fentanyl (1OOmcg/2 ml), 50 mcg, IV, at 12:54  Versed (2mg/2ml), 1 mg,   IV, at 13:17  Fentanyl (1OOmcg/2 ml), 25 mcg, IV, at 13:17  Fentanyl (1OOmcg/2 ml), 50 mcg, IV, at 13:31  CONTRAST GIVEN: 110 ml Omnipaque (350 mg I /ml)  RADIATION EXPOSURE: Fluoroscopy time: 12 08 min  CORONARY VESSELS:  -- Left main: Normal    -- LAD: There was a 100 % proximal stenosis  -- Circumflex: The vessel was medium sized and gave rise to two OM branches  OM1 is occluded  OM2 is patent  -- Ramus intermedius: The vessel was normal sized  There was a 90% stenosis in mid vessel  The SVG to this vessel is no longer patent  There was a 90 % stenosis in the proximal third of the vessel segment  -- RCA: There was a 100 % proximal stenosis   The vein graft to this vessel is no longer patent  -- Graft to the LAD: The graft was a LIMA  The body of the graft and the anastomosis were patent  The mid and distal LAD filled well and were free of obstructive disease  -- Graft to the ramus intermedius: The graft was a sequential vein graft from the ascending aorta  The proximal limb is occluded at the aorta  The continuation of the SVG from the ramus to 0M1 is patent, as outlined below  -- Graft to the 2nd obtuse marginal: The graft was a sequential vein graft from the ascending aorta to the ramus, continuing to OM1  The proximal graft, from the aorta to the ramus, is occluded  The ramus is severely diseased but patent;   consequently, blood flow from the ramus to OM1 is intact  OM1 fills well from the distal graft  However, if the severely diseased ramus becomes occluded, OM1 will also lose flow  -- Graft to the RCA: The graft was a saphenous vein graft from the ascending aorta  There was a 100 % stenosis at the proximal anastomosis  NOTE: Right femoral access was employed  Severe CAD  The vein grafts are occluded, except for the segment of the sequential SVG that still connects the severely diseased ramus to the distal aspect of OM1  The LIMA to the LAD is patent  The   RCA and the graft to the RCA are occluded  There are no percutaneous options  If the patient becomes severely limited, repeat CABG to the RCA and OM2 could be considered, but would carry risk  Prepared and signed by     Deniz Cat MD   Signed 08/10/2017 14:02:27     CC: Dr Bola Myers  Study diagram     Angiographic findings   Native coronary lesions:   ï¾·LAD: Lesion 1: 100 % stenosis  ï¾·Ramus intermedius: Lesion 1: 90 % stenosis  ï¾·RCA: Lesion 1: 100 % stenosis  Coronary graft lesions:   ï¾·Graft to RCA: SVG from ascending aorta ï¾· 100 % stenosis at proximal anastomosis       Hemodynamic tables     Pressures: Baseline   Pressures: - HR: 82   Pressures: - Rhythm:   Pressures: -- Aortic Pressure (S/D/M): 92/60/71     Outputs: Baseline   Outputs: -- CALCULATIONS: [age]   Outputs: -- CALCULATIONS: Body Surface Area: 1 96   Outputs: -- CALCULATIONS: Height in cm: 168 00   Outputs: -- CALCULATIONS: Sex: Male   Outputs: -- CALCULATIONS: Weight in k 20

## 2018-01-22 VITALS
SYSTOLIC BLOOD PRESSURE: 122 MMHG | BODY MASS INDEX: 30.29 KG/M2 | HEART RATE: 58 BPM | WEIGHT: 188.5 LBS | DIASTOLIC BLOOD PRESSURE: 76 MMHG | HEIGHT: 66 IN

## 2018-02-15 PROBLEM — F33.0 DEPRESSION, MAJOR, RECURRENT, MILD (HCC): Status: ACTIVE | Noted: 2017-08-21

## 2018-02-15 PROBLEM — E78.6 LOW HDL (UNDER 40): Status: ACTIVE | Noted: 2017-11-17

## 2018-02-16 ENCOUNTER — OFFICE VISIT (OUTPATIENT)
Dept: FAMILY MEDICINE CLINIC | Facility: CLINIC | Age: 58
End: 2018-02-16
Payer: COMMERCIAL

## 2018-02-16 VITALS
WEIGHT: 198.8 LBS | OXYGEN SATURATION: 98 % | BODY MASS INDEX: 31.95 KG/M2 | DIASTOLIC BLOOD PRESSURE: 78 MMHG | TEMPERATURE: 97.9 F | HEIGHT: 66 IN | HEART RATE: 61 BPM | SYSTOLIC BLOOD PRESSURE: 120 MMHG

## 2018-02-16 DIAGNOSIS — J11.1 INFLUENZA: Primary | ICD-10-CM

## 2018-02-16 DIAGNOSIS — J11.1 BRONCHITIS WITH INFLUENZA: ICD-10-CM

## 2018-02-16 PROCEDURE — 99214 OFFICE O/P EST MOD 30 MIN: CPT | Performed by: PHYSICIAN ASSISTANT

## 2018-02-16 RX ORDER — CLARITHROMYCIN 500 MG/1
500 TABLET, COATED ORAL EVERY 12 HOURS SCHEDULED
Qty: 20 TABLET | Refills: 0 | Status: SHIPPED | OUTPATIENT
Start: 2018-02-16 | End: 2018-02-26

## 2018-02-16 RX ORDER — PREDNISONE 10 MG/1
10 TABLET ORAL 2 TIMES DAILY WITH MEALS
Qty: 10 TABLET | Refills: 0 | Status: SHIPPED | OUTPATIENT
Start: 2018-02-16 | End: 2018-02-21

## 2018-02-16 NOTE — PROGRESS NOTES
Assessment/Plan:         Diagnoses and all orders for this visit:    Influenza  Comments:    Patient has bronchitis and influenza  Too far out for Tamiflu patient to per report to ER if any respiratory distress  Bronchitis with influenza  Comments:    P o  antibiotics and prednisone ordered patient to hold statin statin while on antibiotics  Orders:  -     clarithromycin (BIAXIN) 500 mg tablet; Take 1 tablet (500 mg total) by mouth every 12 (twelve) hours for 10 days Hold statin   For  10  days  -     predniSONE 10 mg tablet; Take 1 tablet (10 mg total) by mouth 2 (two) times a day with meals for 5 days          Subjective:      Patient ID: Jhoan García is a 65 y o  male  Patient presents with cough  Patient states cough started Monday with fever chills and body aches sore throat and runny nose  Patient has headache nasal congestion cough cough is nonproductive dry and spasmodic  Chest hurts from coughing head hurts from coughing no over-the-counter cold meds or cough prep taken  The following portions of the patient's history were reviewed and updated as appropriate:   He  has a past medical history of Anxiety; Coronary artery disease; Depression; Nephrolithiasis; Rotator cuff tear; and Subdural hemorrhage-concussion ([facility] [address] )  He  does not have any pertinent problems on file    Current Outpatient Prescriptions   Medication Sig Dispense Refill    amLODIPine (NORVASC) 5 mg tablet Take 1 tablet by mouth daily      aspirin 81 mg chewable tablet Chew 81 mg daily      clarithromycin (BIAXIN) 500 mg tablet Take 1 tablet (500 mg total) by mouth every 12 (twelve) hours for 10 days Hold statin   For  10  days 20 tablet 0    clopidogrel (PLAVIX) 75 mg tablet Take 1 tablet by mouth daily      escitalopram (LEXAPRO) 10 mg tablet Take 1 tablet by mouth daily      fenofibrate (TRICOR) 145 mg tablet Take 145 mg by mouth daily      isosorbide mononitrate (IMDUR) 30 mg 24 hr tablet Take 30 mg by mouth daily  LORazepam (ATIVAN) 0 5 mg tablet Take 1 tablet by mouth daily as needed      metoprolol succinate (TOPROL-XL) 100 mg 24 hr tablet Take 1 tablet by mouth daily      niacin 250 MG tablet Take 2 tablets by mouth daily      nitroglycerin (NITROSTAT) 0 4 mg SL tablet Place under the tongue      Omega-3 Fatty Acids (FISH OIL) 645 MG CAPS Take by mouth Daily      pravastatin (PRAVACHOL) 20 mg tablet Take 1 tablet by mouth daily      predniSONE 10 mg tablet Take 1 tablet (10 mg total) by mouth 2 (two) times a day with meals for 5 days 10 tablet 0    traZODone (DESYREL) 50 mg tablet Take by mouth      VITAMIN B COMPLEX-C PO Take by mouth      vitamin E, tocopherol, 400 units capsule Take by mouth       No current facility-administered medications for this visit  Current Outpatient Prescriptions on File Prior to Visit   Medication Sig    aspirin 81 mg chewable tablet Chew 81 mg daily    fenofibrate (TRICOR) 145 mg tablet Take 145 mg by mouth daily    isosorbide mononitrate (IMDUR) 30 mg 24 hr tablet Take 30 mg by mouth daily     No current facility-administered medications on file prior to visit  He is allergic to iodinated diagnostic agents and meloxicam     Review of Systems   Constitutional: Negative for activity change, chills and fatigue  HENT: Positive for sinus pain and sinus pressure  Negative for ear pain  Respiratory: Positive for cough and wheezing  Cardiovascular: Positive for chest pain  Gastrointestinal: Positive for diarrhea  Negative for nausea and vomiting  Musculoskeletal: Positive for arthralgias and myalgias  Neurological: Positive for headaches  Objective:    Vitals:    02/16/18 0840   BP: 120/78   Pulse: 61   Temp: 97 9 °F (36 6 °C)   SpO2: 98%        Physical Exam   Constitutional: He appears well-developed and well-nourished  Eyes: Conjunctivae are normal  Pupils are equal, round, and reactive to light  Right eye exhibits no discharge     Neck: No thyromegaly present  Cardiovascular: Normal rate and regular rhythm  Murmur heard  Pulmonary/Chest: Effort normal  No respiratory distress  He has no wheezes  Diffuse  Rhonchi  No e gophony   Musculoskeletal: He exhibits no edema  Lymphadenopathy:     He has no cervical adenopathy  Neurological: He is alert  Skin: Skin is warm and dry  No erythema  Psychiatric: He has a normal mood and affect   His behavior is normal

## 2018-02-16 NOTE — PATIENT INSTRUCTIONS
Acute Bronchitis   WHAT YOU NEED TO KNOW:   Acute bronchitis is swelling and irritation in the air passages of your lungs  This irritation may cause you to cough or have other breathing problems  Acute bronchitis often starts because of another illness, such as a cold or the flu  The illness spreads from your nose and throat to your windpipe and airways  Bronchitis is often called a chest cold  Acute bronchitis lasts about 3 to 6 weeks and is usually not a serious illness  Your cough can last for several weeks  DISCHARGE INSTRUCTIONS:   Return to the emergency department if:   · You cough up blood  · Your lips or fingernails turn blue  · You feel like you are not getting enough air when you breathe  Contact your healthcare provider if:   · You have a fever  · Your breathing problems do not go away or get worse  · Your cough does not get better within 4 weeks  · You have questions or concerns about your condition or care  Self-care:   · Get more rest   Rest helps your body to heal  Slowly start to do more each day  Rest when you feel it is needed  · Avoid irritants in the air  Avoid chemicals, fumes, and dust  Wear a face mask if you must work around dust or fumes  Stay inside on days when air pollution levels are high  If you have allergies, stay inside when pollen counts are high  Do not use aerosol products, such as spray-on deodorant, bug spray, and hair spray  · Do not smoke or be around others who smoke  Nicotine and other chemicals in cigarettes and cigars damages the cilia that move mucus out of your lungs  Ask your healthcare provider for information if you currently smoke and need help to quit  E-cigarettes or smokeless tobacco still contain nicotine  Talk to your healthcare provider before you use these products  · Drink liquids as directed  Liquids help keep your air passages moist and help you cough up mucus   You may need to drink more liquids when you have acute bronchitis  Ask how much liquid to drink each day and which liquids are best for you  · Use a humidifier or vaporizer  Use a cool mist humidifier or a vaporizer to increase air moisture in your home  This may make it easier for you to breathe and help decrease your cough  Decrease risk for acute bronchitis:   · Get the vaccinations you need  Ask your healthcare provider if you should get vaccinated against the flu or pneumonia  · Prevent the spread of germs  You can decrease your risk of acute bronchitis and other illnesses by doing the following:     [facility] your hands often with soap and water  Carry germ-killing hand lotion or gel with you  You can use the lotion or gel to clean your hands when soap and water are not available  ¨ Do not touch your eyes, nose, or mouth unless you have washed your hands first     ¨ Always cover your mouth when you cough to prevent the spread of germs  It is best to cough into a tissue or your shirt sleeve instead of into your hand  Ask those around you cover their mouths when they cough  ¨ Try to avoid people who have a cold or the flu  If you are sick, stay away from others as much as possible  Medicines: Your healthcare provider may  give you any of the following:  · Ibuprofen or acetaminophen  are medicines that help lower your fever  They are available without a doctor's order  Ask your healthcare provider which medicine is right for you  Ask how much to take and how often to take it  Follow directions  These medicines can cause stomach bleeding if not taken correctly  Ibuprofen can cause kidney damage  Do not take ibuprofen if you have kidney disease, an ulcer, or allergies to aspirin  Acetaminophen can cause liver damage  Do not take more than 4,000 milligrams in 24 hours  · Decongestants  help loosen mucus in your lungs and make it easier to cough up  This can help you breathe easier  · Cough suppressants  decrease your urge to cough   If your cough produces mucus, do not take a cough suppressant unless your healthcare provider tells you to  Your healthcare provider may suggest that you take a cough suppressant at night so you can rest     · Inhalers  may be given  Your healthcare provider may give you one or more inhalers to help you breathe easier and cough less  An inhaler gives your medicine to open your airways  Ask your healthcare provider to show you how to use your inhaler correctly  · Take your medicine as directed  Contact your healthcare provider if you think your medicine is not helping or if you have side effects  Tell him of her if you are allergic to any medicine  Keep a list of the medicines, vitamins, and herbs you take  Include the amounts, and when and why you take them  Bring the list or the pill bottles to follow-up visits  Carry your medicine list with you in case of an emergency  Follow up with your healthcare provider as directed:  Write down questions you have so you will remember to ask them during your follow-up visits  © 2017 [address] [employer] Information is for End User's use only and may not be sold, redistributed or otherwise used for commercial purposes  All illustrations and images included in CareNotes® are the copyrighted property of [employer]  or [employer]  The above information is an  only  It is not intended as medical advice for individual conditions or treatments  Talk to your doctor, nurse or pharmacist before following any medical regimen to see if it is safe and effective for you  Influenza   AMBULATORY CARE:   Influenza  (the flu) is an infection caused by the influenza virus  The flu is easily spread when an infected person coughs, sneezes, or has close contact with others  You may be able to spread the flu to others for 1 week or longer after signs or symptoms appear     Common signs and symptoms include the following:   · Fever and chills    · Headaches, body aches, and muscle or joint pain    · Cough, runny nose, and sore throat    · Loss of appetite, nausea, vomiting, or diarrhea    · Tiredness    · Trouble breathing  Call 911 for any of the following:   · You have trouble breathing, and your lips look purple or blue  · You have a seizure  Seek care immediately if:   · You are dizzy, or you are urinating less or not at all  · You have a headache with a stiff neck, and you feel tired or confused  · You have new pain or pressure in your chest     · Your symptoms, such as shortness of breath, vomiting, or diarrhea, get worse  · Your symptoms, such as fever and coughing, seem to get better, but then get worse  Contact your healthcare provider if:   · You have new muscle pain or weakness  · You have questions or concerns about your condition or care  Treatment for influenza  may include any of the following:  · Acetaminophen  decreases pain and fever  It is available without a doctor's order  Ask how much to take and how often to take it  Follow directions  Acetaminophen can cause liver damage if not taken correctly  · NSAIDs , such as ibuprofen, help decrease swelling, pain, and fever  This medicine is available with or without a doctor's order  NSAIDs can cause stomach bleeding or kidney problems in certain people  If you take blood thinner medicine, always ask your healthcare provider if NSAIDs are safe for you  Always read the medicine label and follow directions  · Antivirals  help fight a viral infection  Manage your symptoms:   · Rest  as much as you can to help you recover  · Drink liquids as directed  to help prevent dehydration  Ask how much liquid to drink each day and which liquids are best for you  Prevent the spread of the flu:   · Wash your hands often  Use soap and water  Wash your hands after you use the bathroom, change a child's diapers, or sneeze  Wash your hands before you prepare or eat food   Use gel hand cleanser when soap and water are not available  Do not touch your eyes, nose, or mouth unless you have washed your hands first            · Cover your mouth when you sneeze or cough  Cough into a tissue or the bend of your arm  · Clean shared items with a germ-killing   Clean table surfaces, doorknobs, and light switches  Do not share towels, silverware, and dishes with people who are sick  Wash bed sheets, towels, silverware, and dishes with soap and water  · Wear a mask  over your mouth and nose if you are sick or are near anyone who is sick  · Stay away from others  if you are sick  · Influenza vaccine  helps prevent influenza (flu)  Everyone older than 6 months should get a yearly influenza vaccine  Get the vaccine as soon as it is available, usually in September or October each year  Follow up with your healthcare provider as directed:  Write down your questions so you remember to ask them during your visits  © 2017 [address]  Information is for End User's use only and may not be sold, redistributed or otherwise used for commercial purposes  All illustrations and images included in CareNotes® are the copyrighted property of [employer]  or [employer]  The above information is an  only  It is not intended as medical advice for individual conditions or treatments  Talk to your doctor, nurse or pharmacist before following any medical regimen to see if it is safe and effective for you

## 2018-02-18 ENCOUNTER — APPOINTMENT (EMERGENCY)
Dept: RADIOLOGY | Facility: HOSPITAL | Age: 58
End: 2018-02-18
Payer: COMMERCIAL

## 2018-02-18 ENCOUNTER — HOSPITAL ENCOUNTER (EMERGENCY)
Facility: HOSPITAL | Age: 58
Discharge: HOME | End: 2018-02-18
Attending: EMERGENCY MEDICINE | Admitting: EMERGENCY MEDICINE
Payer: COMMERCIAL

## 2018-02-18 VITALS
HEART RATE: 73 BPM | DIASTOLIC BLOOD PRESSURE: 63 MMHG | BODY MASS INDEX: 31.47 KG/M2 | RESPIRATION RATE: 20 BRPM | WEIGHT: 195 LBS | SYSTOLIC BLOOD PRESSURE: 101 MMHG | TEMPERATURE: 98.1 F | OXYGEN SATURATION: 97 %

## 2018-02-18 DIAGNOSIS — R07.89 CHEST WALL PAIN: ICD-10-CM

## 2018-02-18 DIAGNOSIS — J06.9 VIRAL URI: ICD-10-CM

## 2018-02-18 DIAGNOSIS — J20.9 ACUTE BRONCHITIS: Primary | ICD-10-CM

## 2018-02-18 LAB
ANION GAP SERPL CALCULATED.3IONS-SCNC: 10 MMOL/L (ref 4–13)
ATRIAL RATE: 77 BPM
BASOPHILS # BLD AUTO: 0.03 THOUSANDS/ΜL (ref 0–0.1)
BASOPHILS NFR BLD AUTO: 0 % (ref 0–1)
BUN SERPL-MCNC: 19 MG/DL (ref 5–25)
CALCIUM SERPL-MCNC: 9.2 MG/DL (ref 8.3–10.1)
CHLORIDE SERPL-SCNC: 106 MMOL/L (ref 100–108)
CO2 SERPL-SCNC: 28 MMOL/L (ref 21–32)
CREAT SERPL-MCNC: 1.02 MG/DL (ref 0.6–1.3)
EOSINOPHIL # BLD AUTO: 0 THOUSAND/ΜL (ref 0–0.61)
EOSINOPHIL NFR BLD AUTO: 0 % (ref 0–6)
ERYTHROCYTE [DISTWIDTH] IN BLOOD BY AUTOMATED COUNT: 13.2 % (ref 11.6–15.1)
GFR SERPL CREATININE-BSD FRML MDRD: 82 ML/MIN/1.73SQ M
GLUCOSE SERPL-MCNC: 192 MG/DL (ref 65–140)
HCT VFR BLD AUTO: 39.4 % (ref 36.5–49.3)
HGB BLD-MCNC: 13.3 G/DL (ref 12–17)
LYMPHOCYTES # BLD AUTO: 0.7 THOUSANDS/ΜL (ref 0.6–4.47)
LYMPHOCYTES NFR BLD AUTO: 8 % (ref 14–44)
MCH RBC QN AUTO: 28.1 PG (ref 26.8–34.3)
MCHC RBC AUTO-ENTMCNC: 33.8 G/DL (ref 31.4–37.4)
MCV RBC AUTO: 83 FL (ref 82–98)
MONOCYTES # BLD AUTO: 0.67 THOUSAND/ΜL (ref 0.17–1.22)
MONOCYTES NFR BLD AUTO: 8 % (ref 4–12)
NEUTROPHILS # BLD AUTO: 7.05 THOUSANDS/ΜL (ref 1.85–7.62)
NEUTS SEG NFR BLD AUTO: 83 % (ref 43–75)
NRBC BLD AUTO-RTO: 0 /100 WBCS
P AXIS: 68 DEGREES
PLATELET # BLD AUTO: 221 THOUSANDS/UL (ref 149–390)
PMV BLD AUTO: 9.3 FL (ref 8.9–12.7)
POTASSIUM SERPL-SCNC: 3.7 MMOL/L (ref 3.5–5.3)
PR INTERVAL: 160 MS
QRS AXIS: -78 DEGREES
QRSD INTERVAL: 94 MS
QT INTERVAL: 406 MS
QTC INTERVAL: 459 MS
RBC # BLD AUTO: 4.73 MILLION/UL (ref 3.88–5.62)
SODIUM SERPL-SCNC: 144 MMOL/L (ref 136–145)
T WAVE AXIS: 93 DEGREES
TROPONIN I SERPL-MCNC: <0.02 NG/ML
VENTRICULAR RATE: 77 BPM
WBC # BLD AUTO: 8.49 THOUSAND/UL (ref 4.31–10.16)

## 2018-02-18 PROCEDURE — 93005 ELECTROCARDIOGRAM TRACING: CPT | Performed by: EMERGENCY MEDICINE

## 2018-02-18 PROCEDURE — 85025 COMPLETE CBC W/AUTO DIFF WBC: CPT | Performed by: EMERGENCY MEDICINE

## 2018-02-18 PROCEDURE — 94640 AIRWAY INHALATION TREATMENT: CPT

## 2018-02-18 PROCEDURE — 71046 X-RAY EXAM CHEST 2 VIEWS: CPT

## 2018-02-18 PROCEDURE — 36415 COLL VENOUS BLD VENIPUNCTURE: CPT | Performed by: EMERGENCY MEDICINE

## 2018-02-18 PROCEDURE — 93005 ELECTROCARDIOGRAM TRACING: CPT

## 2018-02-18 PROCEDURE — 99284 EMERGENCY DEPT VISIT MOD MDM: CPT

## 2018-02-18 PROCEDURE — 93010 ELECTROCARDIOGRAM REPORT: CPT | Performed by: INTERNAL MEDICINE

## 2018-02-18 PROCEDURE — 80048 BASIC METABOLIC PNL TOTAL CA: CPT | Performed by: EMERGENCY MEDICINE

## 2018-02-18 PROCEDURE — 84484 ASSAY OF TROPONIN QUANT: CPT | Performed by: EMERGENCY MEDICINE

## 2018-02-18 RX ORDER — FLUTICASONE PROPIONATE 50 MCG
1 SPRAY, SUSPENSION (ML) NASAL DAILY PRN
Qty: 16 G | Refills: 0 | Status: SHIPPED | OUTPATIENT
Start: 2018-02-18 | End: 2018-09-24

## 2018-02-18 RX ORDER — ALBUTEROL SULFATE 2.5 MG/3ML
2.5 SOLUTION RESPIRATORY (INHALATION) ONCE
Status: COMPLETED | OUTPATIENT
Start: 2018-02-18 | End: 2018-02-18

## 2018-02-18 RX ORDER — BENZONATATE 100 MG/1
100 CAPSULE ORAL 3 TIMES DAILY PRN
Qty: 21 CAPSULE | Refills: 0 | Status: SHIPPED | OUTPATIENT
Start: 2018-02-18 | End: 2018-09-24 | Stop reason: ALTCHOICE

## 2018-02-18 RX ORDER — UBIDECARENONE 200 MG
200 CAPSULE ORAL DAILY
COMMUNITY

## 2018-02-18 RX ORDER — ALBUTEROL SULFATE 90 UG/1
2 AEROSOL, METERED RESPIRATORY (INHALATION) EVERY 4 HOURS PRN
Qty: 1 INHALER | Refills: 0 | Status: SHIPPED | OUTPATIENT
Start: 2018-02-18 | End: 2018-03-09 | Stop reason: ALTCHOICE

## 2018-02-18 RX ADMIN — ALBUTEROL SULFATE 2.5 MG: 2.5 SOLUTION RESPIRATORY (INHALATION) at 13:05

## 2018-02-18 RX ADMIN — IPRATROPIUM BROMIDE 0.5 MG: 0.5 SOLUTION RESPIRATORY (INHALATION) at 13:05

## 2018-02-18 NOTE — DISCHARGE INSTRUCTIONS
Continue to take the medications prescribed by your doctor  Take the new medications as needed  Take ibuprofen (Motrin, Advil) or acetaminophen (Tylenol) as needed for pain, as per the instructions  Drink plenty of fluids while sick  Use an over-the-counter decongestant to help with your congestion  Follow up your primary care doctor to make sure you are doing better  Return if you develop severe trouble breathing, or for any other concerns  Acute Bronchitis   WHAT YOU SHOULD KNOW:   Acute bronchitis is swelling and irritation in the air passages of your lungs  This irritation may cause you to cough or have other breathing problems  Acute bronchitis often starts because of another viral illness, such as a cold or the flu  The illness spreads from your nose and throat to your windpipe and airways  Bronchitis is often called a chest cold  Acute bronchitis lasts about 2 weeks and is usually not a serious illness  AFTER YOU LEAVE:   Medicines:   · Ibuprofen or acetaminophen:  These medicines help lower a fever  They are available without a doctor's order  Ask your healthcare provider which medicine is right for you  Ask how much to take and how often to take it  Follow directions  These medicines can cause stomach bleeding if not taken correctly  Ibuprofen can cause kidney damage  Do not take ibuprofen if you have kidney disease, an ulcer, or allergies to aspirin  Acetaminophen can cause liver damage  Do not drink alcohol if you take acetaminophen  · Cough medicine: This medicine helps loosen mucus in your lungs and make it easier to cough up  This can help you breathe easier  · Inhalers: You may need one or more inhalers to help you breathe easier and cough less  An inhaler gives your medicine in a mist form so that you can breathe it into your lungs  Ask your healthcare provider to show you how to use your inhaler correctly      · Steroid medicine:  Steroid medicine helps open your air passages so you can breathe easier  · Take your medicine as directed  Call your healthcare provider if you think your medicine is not helping or if you have side effects  Tell him if you are allergic to any medicine  Keep a list of the medicines, vitamins, and herbs you take  Include the amounts, and when and why you take them  Bring the list or the pill bottles to follow-up visits  Carry your medicine list with you in case of an emergency  How to use an inhaler:   · Shake the inhaler well to make sure you get the correct amount of medicine per puff  Remove the cover from your inhaler's mouthpiece  If you are using a spacer, connect your inhaler to the flat end of the spacer  · Exhale as much air from your lungs as you can  Put the mouthpiece in your mouth past your front teeth and rest it on the top of your tongue  Do not block the mouthpiece opening with your tongue  · Breathe in through your mouth at a slow and steady rate  As you do this, press the inhaler to release the puff of medicine  Finish breathing in slowly and deeply as you inhale the medicine  When your lungs are full, hold your breath for 10 seconds  Then breathe out slowly through puckered lips or through your nose  · If you need to take more puffs, wait at least 1 minute between each puff  · Rinse your mouth with water after you use the inhaler  This may keep you from getting a mouth infection or irritation  · Follow the instructions that come with your inhaler to clean it  You should clean your inhaler at least once a week  Ways to care for yourself:   · Avoid alcohol:  Alcohol dulls your urge to cough and sneeze  When you have bronchitis, you need to be able to cough and sneeze to clear your air passages  Alcohol also causes your body to lose fluid  This can make the mucus in your lungs thicker and harder to cough up  · Avoid irritants in the air:  Do not smoke or allow others to smoke around you   Avoid chemicals, fumes, and dust  Wear a face mask if you must work around dust or fumes  Stay inside on days when air pollution levels are high  If you have allergies, stay inside when pollen counts are high  Avoid aerosol products  This includes spray-on deodorant, bug spray, and hair spray  · Drink more liquids:  Most people should drink at least 8 eight-ounce cups of water a day  You may need to drink more liquids when you have acute bronchitis  Liquids help keep your air passages moist and help you cough up mucus  · Get more rest:  You may feel like resting more  Slowly start to do more each day  Rest when you feel it is needed  · Eat healthy foods:  Eat a variety healthy foods every day  Your diet should include fruits, vegetables, breads, and protein (such as chicken, fish, and beans)  Dairy products (such as milk, cheese, and ice cream) can sometimes increase the amount of mucus your body makes  Ask if you should decrease your intake of dairy products  · Use a humidifier:  Use a cool mist humidifier to increase air moisture in your home  This may make it easier for you to breathe and help decrease your cough  Decrease your risk of acute bronchitis:   · Get the vaccinations you need:  Ask your healthcare provider if you should get vaccinated against the flu or pneumonia  · Avoid things that may irritate your lungs:  Stay inside or cover your mouth and nose with a scarf when you are outside during cold weather  You should also stay inside on days when air pollution levels are high  If you have allergies, stay inside when pollen counts are high  Avoid using aerosol products in your home  This includes spray-on deodorant, bug spray, and hair spray  · Avoid the spread of germs:        [facility] your hands often with soap and water  Carry germ-killing gel with you  You can use the gel to clean your hands when there is no soap and water available      ¨ Do not touch your eyes, nose, or mouth unless you have washed your hands first     ¨ Always cover your mouth when you cough  Cough into a tissue or your shirtsleeve so you do not spread germs from your hands  ¨ Try to avoid people who have a cold or the flu  If you are sick, stay away from others as much as possible  Follow up with your healthcare provider as directed:  Write down questions you have so you will remember to ask them during your follow-up visits  Contact your healthcare provider if:   · You have a fever  · Your skin becomes itchy or you have a rash after you take your medicine  · Your breathing problems do not go away or get worse  · Your cough does not get better with treatment  · You cough up blood  · You have questions or concerns about your condition or care  Seek care immediately or call 911 if:   · You faint  · Your lips or fingernails turn blue  · You feel like you are not getting enough air when you breathe  · You have swelling of your lips, tongue, or throat that makes it hard to breathe or swallow  © 2014 [employer] is for End User's use only and may not be sold, redistributed or otherwise used for commercial purposes  All illustrations and images included in CareNotes® are the copyrighted property of A [employer]  or Emile Rodriguez  The above information is an  only  It is not intended as medical advice for individual conditions or treatments  Talk to your doctor, nurse or pharmacist before following any medical regimen to see if it is safe and effective for you

## 2018-02-18 NOTE — ED PROVIDER NOTES
History  Chief Complaint   Patient presents with    Flu Symptoms     pt presents ambulatory states he was dx with bronchitis and the flu on friday "it's getting worse" currently on clarithromycin and prednisone     HPI    Prior to Admission Medications   Prescriptions Last Dose Informant Patient Reported? Taking?    Coenzyme Q10 200 MG capsule   Yes Yes   Sig: Take 200 mg by mouth daily   LORazepam (ATIVAN) 0 5 mg tablet   Yes Yes   Sig: Take 1 mg by mouth daily as needed     Omega-3 Fatty Acids (FISH OIL) 645 MG CAPS   Yes Yes   Sig: Take by mouth Daily   VITAMIN B COMPLEX-C PO   Yes Yes   Sig: Take by mouth   amLODIPine (NORVASC) 5 mg tablet   Yes Yes   Sig: Take 1 tablet by mouth daily   aspirin 81 mg chewable tablet   Yes Yes   Sig: Chew 81 mg daily   clarithromycin (BIAXIN) 500 mg tablet   No Yes   Sig: Take 1 tablet (500 mg total) by mouth every 12 (twelve) hours for 10 days Hold statin   For  10  days   clopidogrel (PLAVIX) 75 mg tablet   Yes Yes   Sig: Take 1 tablet by mouth daily   escitalopram (LEXAPRO) 10 mg tablet   Yes Yes   Sig: Take 1 tablet by mouth daily   fenofibrate (TRICOR) 145 mg tablet   Yes Yes   Sig: Take 145 mg by mouth daily   isosorbide mononitrate (IMDUR) 30 mg 24 hr tablet   Yes Yes   Sig: Take 30 mg by mouth daily   metoprolol succinate (TOPROL-XL) 100 mg 24 hr tablet   Yes Yes   Sig: Take 1 tablet by mouth daily   niacin 250 MG tablet   Yes Yes   Sig: Take 2 tablets by mouth daily   nitroglycerin (NITROSTAT) 0 4 mg SL tablet   Yes Yes   Sig: Place 0 4 mg under the tongue every 5 (five) minutes as needed     pravastatin (PRAVACHOL) 20 mg tablet   Yes Yes   Sig: Take 1 tablet by mouth daily   predniSONE 10 mg tablet   No Yes   Sig: Take 1 tablet (10 mg total) by mouth 2 (two) times a day with meals for 5 days   traZODone (DESYREL) 50 mg tablet   Yes Yes   Sig: Take 25 mg by mouth     vitamin E, tocopherol, 200 units capsule   Yes Yes   Sig: Take 200 Units by mouth daily Facility-Administered Medications: None       Past Medical History:   Diagnosis Date    Anxiety     Coronary artery disease     Depression     Nephrolithiasis     Rotator cuff tear     Subdural hemorrhage-concussion (HCC)        Past Surgical History:   Procedure Laterality Date    BRAIN SURGERY      CORONARY ARTERY BYPASS GRAFT      HERNIA REPAIR      INGUINAL HERNIA REPAIR      OTHER SURGICAL HISTORY      cardiac cath procedure outcome - successful     SHOULDER SURGERY      arthroscopy     TONSILLECTOMY      VASECTOMY         Family History   Problem Relation Age of Onset    Diabetes Mother      mellitus     Depression Mother     Stroke Father     Other Father      other lymphatic and hematopoietic neoplasms     Hypertension Family     Cancer Family     Arthritis Family      I have reviewed and agree with the history as documented  Social History   Substance Use Topics    Smoking status: Former Smoker    Smokeless tobacco: Never Used    Alcohol use Yes      Comment: social         Review of Systems    Physical Exam  ED Triage Vitals [02/18/18 1155]   Temperature Pulse Respirations Blood Pressure SpO2   98 1 °F (36 7 °C) 77 20 161/81 100 %      Temp Source Heart Rate Source Patient Position - Orthostatic VS BP Location FiO2 (%)   Oral Monitor Sitting Left arm --      Pain Score       6           Orthostatic Vital Signs  Vitals:    02/18/18 1155 02/18/18 1337 02/18/18 1442   BP: 161/81 115/70 101/63   Pulse: 77 78 73   Patient Position - Orthostatic VS: Sitting Lying Lying       Physical Exam   Constitutional: He is oriented to person, place, and time  He appears well-developed and well-nourished  No distress  HENT:   Head: Normocephalic and atraumatic  Right Ear: Tympanic membrane, external ear and ear canal normal    Left Ear: Tympanic membrane, external ear and ear canal normal    Nose: Mucosal edema and rhinorrhea present     Mouth/Throat: Oropharynx is clear and moist and mucous membranes are normal  No oral lesions  No trismus in the jaw  Tonsils are 0 on the right  Tonsils are 0 on the left  No tonsillar exudate  Eyes: Conjunctivae are normal  Pupils are equal, round, and reactive to light  Neck: Normal range of motion  Neck supple  No tracheal deviation present  Cardiovascular: Normal rate, regular rhythm and intact distal pulses  Murmur heard  Systolic murmur is present with a grade of 2/6   Pulmonary/Chest: Effort normal and breath sounds normal  No respiratory distress  He exhibits tenderness (mild)  Abdominal: Soft  He exhibits no distension  There is no tenderness  Musculoskeletal: He exhibits no edema  Lymphadenopathy:     He has no cervical adenopathy  Neurological: He is alert and oriented to person, place, and time  GCS eye subscore is 4  GCS verbal subscore is 5  GCS motor subscore is 6  Skin: Skin is warm and dry  Psychiatric: He has a normal mood and affect  His behavior is normal    Nursing note and vitals reviewed  ED Medications  Medications   albuterol inhalation solution 2 5 mg (2 5 mg Nebulization Given 2/18/18 1305)   ipratropium (ATROVENT) 0 02 % inhalation solution 0 5 mg (0 5 mg Nebulization Given 2/18/18 1305)       Diagnostic Studies  Results Reviewed     Procedure Component Value Units Date/Time    Troponin I [[id number]]  (Normal) Collected:  02/18/18 1258    Lab Status:  Final result Specimen:  Blood from Arm, Left Updated:  02/18/18 1330     Troponin I <0 02 ng/mL     Narrative:         [employer] Chemistry analyzer 99% cutoff is > 0 04 ng/mL in network labs    o cTnI 99% cutoff is useful only when applied to patients in the clinical setting of myocardial ischemia  o cTnI 99% cutoff should be interpreted in the context of clinical history, ECG findings and possibly cardiac imaging to establish correct diagnosis    o cTnI 99% cutoff may be suggestive but clearly not indicative of a coronary event without the clinical setting of myocardial ischemia  Basic metabolic panel [[id number]]  (Abnormal) Collected:  02/18/18 1258    Lab Status:  Final result Specimen:  Blood from Arm, Left Updated:  02/18/18 1322     Sodium 144 mmol/L      Potassium 3 7 mmol/L      Chloride 106 mmol/L      CO2 28 mmol/L      Anion Gap 10 mmol/L      BUN 19 mg/dL      Creatinine 1 02 mg/dL      Glucose 192 (H) mg/dL      Calcium 9 2 mg/dL      eGFR 82 ml/min/1 73sq m     Narrative:         National Kidney Disease Education Program recommendations are as follows:  GFR calculation is accurate only with a steady state creatinine  Chronic Kidney disease less than 60 ml/min/1 73 sq  meters  Kidney failure less than 15 ml/min/1 73 sq  meters  CBC and differential [[id number]]  (Abnormal) Collected:  02/18/18 1258    Lab Status:  Final result Specimen:  Blood from Arm, Left Updated:  02/18/18 1313     WBC 8 49 Thousand/uL      RBC 4 73 Million/uL      Hemoglobin 13 3 g/dL      Hematocrit 39 4 %      MCV 83 fL      MCH 28 1 pg      MCHC 33 8 g/dL      RDW 13 2 %      MPV 9 3 fL      Platelets 186 Thousands/uL      nRBC 0 /100 WBCs      Neutrophils Relative 83 (H) %      Lymphocytes Relative 8 (L) %      Monocytes Relative 8 %      Eosinophils Relative 0 %      Basophils Relative 0 %      Neutrophils Absolute 7 05 Thousands/µL      Lymphocytes Absolute 0 70 Thousands/µL      Monocytes Absolute 0 67 Thousand/µL      Eosinophils Absolute 0 00 Thousand/µL      Basophils Absolute 0 03 Thousands/µL                  XR chest 2 views   Final Result by Annalisa Rabago MD (02/18 1322)      No acute cardiopulmonary disease        Workstation performed: [id number]                    Procedures  ECG 12 Lead Documentation  Date/Time: 2/18/2018 1:11 PM  Performed by: Jaimie Hoffmann  Authorized by: Jaimie Hoffmann     Indications / Diagnosis:  SOB  ECG reviewed by me, the ED Provider: yes    Patient location:  ED  Previous ECG:     Previous ECG:  Compared to current    Comparison ECG info:  08/10/17    Similarity:  No change  Interpretation:     Interpretation: abnormal    Rate:     ECG rate:  72    ECG rate assessment: normal    Rhythm:     Rhythm: sinus rhythm    Ectopy:     Ectopy: none    QRS:     QRS axis: right superior  QRS intervals:  Normal  Conduction:     Conduction: normal    ST segments:     ST segments:  Normal  T waves:     T waves: inverted      Inverted:  I, aVL, V1, V2, V3 and V4           Phone Contacts  ED Phone Contact    ED Course  ED Course                                MDM  Number of Diagnoses or Management Options  Acute bronchitis: new and requires workup  Chest wall pain: new and requires workup  Viral URI: new and requires workup  Diagnosis management comments: This is a 41-year-old presents here today with cough and trouble breathing  He states on 02/12 he developed subjective fevers, chills, congestion, cough  He was seen by his PCP on 02/16, and was told that it was likely influenza, but he was outside of treatment window for Tamiflu  He was given clarithromycin and prednisone for bronchitis  He has not been taking anything for his congestion or specifically for his cough  He states he felt better yesterday, but his cough worsened today, and he is having some recurrence of trouble breathing  He states he has pain from his head down through his stomach with coughing, but denies any chest pain at rest   He sometimes feels like he cannot take a deep breath  He does have a history of coronary artery disease, but denies any underlying problems with COPD or asthma  He is a former smoker  He denies any known sick contacts  He does have a history of coronary artery disease status post bypass, with chronic blockage of several of his grafts  He denies any symptoms that are typical of his usual anginal chest pain  He has no orthopnea, lower extremity edema, or other complaints  ROS: Otherwise negative, unless stated as above      He is well-appearing, in no acute distress  He has mild wheezing on exam, but no respiratory distress  He does have chest tenderness and nasal congestion  The remainder of his exam is unremarkable  This is most likely viral bronchitis for which he requires treatment with albuterol, and antibiotics would not necessarily be affective  Given his reported worsening symptoms and presumed recent influenza, we will get a chest x-ray to evaluate for signs of developing pneumonia  Though he has reproducible chest tenderness in his pain is primarily with coughing, given his history of coronary artery disease we will check an EKG and lab work to evaluate for possible signs of ACS  We will treat his symptoms here  He has had some improvement of his shortened breath, but still feels like his cough is bad, and unchanged  His wheezing has resolved on repeat evaluation  This chest x-ray shows no acute abnormalities, and his lab work is unremarkable  I discussed with the patient continued symptom treatment at home, follow-up with his pediatrician, and indications for return, and he expresses understanding with this plan         Amount and/or Complexity of Data Reviewed  Clinical lab tests: ordered and reviewed  Tests in the radiology section of CPT®: ordered and reviewed  Independent visualization of images, tracings, or specimens: yes      CritCare Time    Disposition  Final diagnoses:   Acute bronchitis   Viral URI   Chest wall pain     Time reflects when diagnosis was documented in both MDM as applicable and the Disposition within this note     Time User Action Codes Description Comment    2/18/2018  2:26 PM Antoinette Bernard [J20 9] Acute bronchitis     2/18/2018  2:26 PM Antoinette Bernard Add [J06 9,  B97 89] Viral URI     2/18/2018  2:26 PM Antoinette Bernard Add [R07 89] Chest wall pain       ED Disposition     ED Disposition Condition Comment    Discharge  Honor Varsha discharge to home/self care      Condition at discharge: Good        Follow-up Information     Follow up With Specialties Details Why Contact Info    Lj Tirado MD [facility] Schedule an appointment as soon as possible for a visit in 2 days to follow up on your symptoms [address] [place] [address] Close  [phone]          Discharge Medication List as of 2/18/2018  2:30 PM      START taking these medications    Details   albuterol (PROVENTIL HFA,VENTOLIN HFA) 90 mcg/act inhaler Inhale 2 puffs every 4 (four) hours as needed for wheezing or shortness of breath, Starting Sun 2/18/2018, Print      benzonatate (TESSALON PERLES) 100 mg capsule Take 1 capsule (100 mg total) by mouth 3 (three) times a day as needed for cough, Starting Sun 2/18/2018, Print      fluticasone (FLONASE) 50 mcg/act nasal spray 1 spray into each nostril daily as needed for rhinitis, Starting Sun 2/18/2018, Print         CONTINUE these medications which have NOT CHANGED    Details   amLODIPine (NORVASC) 5 mg tablet Take 1 tablet by mouth daily, Starting Mon 8/14/2017, Historical Med      aspirin 81 mg chewable tablet Chew 81 mg daily, Historical Med      clarithromycin (BIAXIN) 500 mg tablet Take 1 tablet (500 mg total) by mouth every 12 (twelve) hours for 10 days Hold statin   For  10  days, Starting Fri 2/16/2018, Until Mon 2/26/2018, Normal      clopidogrel (PLAVIX) 75 mg tablet Take 1 tablet by mouth daily, Starting Mon 8/14/2017, Historical Med      Coenzyme Q10 200 MG capsule Take 200 mg by mouth daily, Historical Med      escitalopram (LEXAPRO) 10 mg tablet Take 1 tablet by mouth daily, Starting Mon 8/21/2017, Historical Med      fenofibrate (TRICOR) 145 mg tablet Take 145 mg by mouth daily, Historical Med      isosorbide mononitrate (IMDUR) 30 mg 24 hr tablet Take 30 mg by mouth daily, Historical Med      LORazepam (ATIVAN) 0 5 mg tablet Take 1 mg by mouth daily as needed  , Starting Fri 10/6/2017, Historical Med metoprolol succinate (TOPROL-XL) 100 mg 24 hr tablet Take 1 tablet by mouth daily, Starting Mon 8/14/2017, Historical Med      niacin 250 MG tablet Take 2 tablets by mouth daily, Starting Fri 11/17/2017, Historical Med      nitroglycerin (NITROSTAT) 0 4 mg SL tablet Place 0 4 mg under the tongue every 5 (five) minutes as needed  , Starting Mon 8/14/2017, Historical Med      Omega-3 Fatty Acids (FISH OIL) 645 MG CAPS Take by mouth Daily, Historical Med      pravastatin (PRAVACHOL) 20 mg tablet Take 1 tablet by mouth daily, Starting Mon 8/14/2017, Historical Med      predniSONE 10 mg tablet Take 1 tablet (10 mg total) by mouth 2 (two) times a day with meals for 5 days, Starting Fri 2/16/2018, Until Wed 2/21/2018, Normal      traZODone (DESYREL) 50 mg tablet Take 25 mg by mouth  , Starting Fri 10/6/2017, Historical Med      VITAMIN B COMPLEX-C PO Take by mouth, Historical Med      vitamin E, tocopherol, 200 units capsule Take 200 Units by mouth daily, Historical Med           No discharge procedures on file      ED Provider  Electronically Signed by           Jaylen Ricci MD  02/18/18 6549

## 2018-02-20 LAB
ATRIAL RATE: 72 BPM
P AXIS: 42 DEGREES
PR INTERVAL: 160 MS
QRS AXIS: 259 DEGREES
QRSD INTERVAL: 94 MS
QT INTERVAL: 408 MS
QTC INTERVAL: 446 MS
T WAVE AXIS: 103 DEGREES
VENTRICULAR RATE: 72 BPM

## 2018-02-20 PROCEDURE — 93010 ELECTROCARDIOGRAM REPORT: CPT | Performed by: INTERNAL MEDICINE

## 2018-03-09 ENCOUNTER — OFFICE VISIT (OUTPATIENT)
Dept: CARDIOLOGY CLINIC | Facility: CLINIC | Age: 58
End: 2018-03-09
Payer: COMMERCIAL

## 2018-03-09 VITALS
DIASTOLIC BLOOD PRESSURE: 72 MMHG | BODY MASS INDEX: 31.5 KG/M2 | HEIGHT: 66 IN | OXYGEN SATURATION: 97 % | WEIGHT: 196 LBS | HEART RATE: 60 BPM | SYSTOLIC BLOOD PRESSURE: 116 MMHG

## 2018-03-09 DIAGNOSIS — I10 ESSENTIAL HYPERTENSION: ICD-10-CM

## 2018-03-09 DIAGNOSIS — R07.0 THROAT PAIN: Primary | ICD-10-CM

## 2018-03-09 DIAGNOSIS — I25.10 CORONARY ARTERY DISEASE INVOLVING NATIVE HEART WITHOUT ANGINA PECTORIS, UNSPECIFIED VESSEL OR LESION TYPE: ICD-10-CM

## 2018-03-09 DIAGNOSIS — E78.2 MIXED HYPERLIPIDEMIA: ICD-10-CM

## 2018-03-09 DIAGNOSIS — Z95.1 HX OF CABG: ICD-10-CM

## 2018-03-09 PROCEDURE — 99214 OFFICE O/P EST MOD 30 MIN: CPT | Performed by: INTERNAL MEDICINE

## 2018-03-09 RX ORDER — PRAVASTATIN SODIUM 40 MG
40 TABLET ORAL DAILY
Qty: 90 TABLET | Refills: 1 | Status: SHIPPED | OUTPATIENT
Start: 2018-03-09 | End: 2018-09-24 | Stop reason: SDUPTHER

## 2018-03-09 RX ORDER — RANOLAZINE 500 MG/1
500 TABLET, EXTENDED RELEASE ORAL 2 TIMES DAILY
Qty: 90 TABLET | Refills: 1 | Status: SHIPPED | OUTPATIENT
Start: 2018-03-09 | End: 2019-01-17 | Stop reason: ALTCHOICE

## 2018-03-09 NOTE — PROGRESS NOTES
CARDIOLOGY OFFICE VISIT  [facility]  [place] [address], [place], [address] [place] [address], [facility], [address]  Tel: [phone]      NAME: Alvaro Jeffrey  AGE: 65 y o  SEX: male  : 1960   MRN: [id number]      Chief Complaint:  Chief Complaint   Patient presents with    Coronary Artery Disease    Hyperlipidemia         History of Present Illness:   Patient of Dr Tan Cardenas who comes for follow up  States he gets throat pains whenever he exerts more  He works in a [employer] so he does need to go up and down steps or lift heavy stuff and then the throat pain happens  That is his anginal pain  He is aware that he needs to take SL NTG at that time but he waits for about 10 minutes before using it  No associated symptoms  Denies SOB, palpitations, lightheadedness, syncope, swelling feet, orthopnea, PND, claudication  CAD s/p CABG x 4 by Dr Ian Barrera in  - with anginal symptoms  Earlier had an abnormal stress test followed by angiogram done at [place] [address] by Dr Ronan Stephens  It showed occluded right and occluded graft to the right, patent LIMA to the LAD, occluded sequential vein graft to ramus and OM 1  Ramus was significantly stenotic and OM1 was occluded at its origin  Patient was not felt to be a candidate for PCI  Patient declined any consideration of surgery as he had a very difficult postoperative course in  and is not interested in this option but would like to continue on his present course of intensified medical therapy  HTN -  Has been hypertensive for many years  Taking medications regularly  Denies lightheadedness, headache, medication side effects  HLP -  Has had hyperlipidemia for many years  Taking statin regularly along with diet control  Does not take niacin because of flushing side effect  Has some muscle cramps in the legs for which he takes coenzyme Q  PCP closely monitoring the blood work        Past Medical History:  Past Medical History:   Diagnosis Date    Anxiety     Coronary artery disease     Depression     Hyperlipidemia     Nephrolithiasis     Rotator cuff tear     Sleep apnea     Subdural hemorrhage-concussion (HCC)          Past Surgical History:  Past Surgical History:   Procedure Laterality Date    BRAIN SURGERY      CARDIAC CATHETERIZATION      CORONARY ARTERY BYPASS GRAFT      HERNIA REPAIR      INGUINAL HERNIA REPAIR      OTHER SURGICAL HISTORY      cardiac cath procedure outcome - successful     SHOULDER SURGERY      arthroscopy     TONSILLECTOMY      VASECTOMY           Family History:  Family History   Problem Relation Age of Onset    Diabetes Mother      mellitus     Depression Mother     Stroke Father     Other Father      other lymphatic and hematopoietic neoplasms     Hypertension Family     Cancer Family     Arthritis Family          Social History:  Social History     Social History    Marital status: /Civil Union     Spouse name: N/A    Number of children: N/A    Years of education: N/A     Social History Main Topics    Smoking status: Former Smoker    Smokeless tobacco: Never Used    Alcohol use Yes      Comment: social     Drug use: No    Sexual activity: Not on file     Other Topics Concern    Not on file     Social History Narrative    Caffeine use          Active Problems:  Patient Active Problem List   Diagnosis    Chest pain    Anxiety    Coronary artery disease involving native heart without angina pectoris    Depression, major, recurrent, mild (HCC)    Esophageal reflux    Mixed hyperlipidemia    Insomnia    Low HDL (under 40)    Obesity    Obstructive sleep apnea    Restless legs syndrome    Hx of CABG         The following portions of the patient's history were reviewed and updated as appropriate: past medical history, past surgical history, past family history,  past social history, current medications, allergies and problem list       Review of Systems:  Constitutional: Denies fever, chills, fatigue  Eyes: Denies eye redness, eye discharge, double vision  ENT: Denies hearing loss, tinnitus, sneezing, nasal discharge, sore throat   Respiratory: Denies cough, expectoration, hemoptysis, shortness of breath  Cardiovascular: +throat pain  No palpitations, orthopnea, PND, lower extremity swelling  Gastrointestinal: Denies abdominal pain, nausea, vomiting, hematemesis, diarrhea, bloody stools  Genito-Urinary: Denies dysuria, incontinence  Musculoskeletal: Denies back pain, joint pain  +muscle pain  Neurologic: Denies confusion, lightheadedness, syncope, headache, focal weakness, sensory changes, seizures  Endocrine: Denies polyuria, polydipsia, temperature intolerance  Allergy and Immunology: Denies hives, insect bite sensitivity  Hematological and Lymphatic: Denies bleeding problems, swollen glands   Psychological: Denies depression, suicidal ideation, anxiety, panic  Dermatological: Denies pruritus, rash, skin lesion changes      Vitals:  Vitals:    03/09/18 0819   BP: 116/72   Pulse: 60   SpO2: 97%       Body mass index is 31 64 kg/m²  Weight (last 2 days)     Date/Time   Weight    03/09/18 0819  88 9 (196)                Physical Examination:  General: Patient is not in acute distress  Awake, alert, oriented in time, place and person  Responding to commands  Head: Normocephalic  Atraumatic  Eyes: Both pupils normal sized, round and reactive to light  Nonicteric  ENT: Normal external ear canals  Nares normal, no drainage  Lips and oral mucosa normal  Neck: Supple  JVP not raised  Trachea central  No thyromegaly  Lungs: Bilateral bronchovascular breath sounds with no crackles or rhonchi  Chest wall: No tenderness  Sternal scar +  Cardiovascular: RRR  S1 and S2 normal  No murmur, rub or gallop  Gastrointestinal: Abdomen soft, nontender  No guarding or rigidity  Liver and spleen not palpable   Bowel sounds present  Neurologic: Patient is awake, alert, oriented in time, place and person  Responding to command  Moving all extremities  Integumentary:  No skin rash  Lymphatic: No cervical lymphadenopathy  Back: Symmetric   No CVA tenderness  Extremities: No clubbing, cyanosis or edema      Laboratory Results:  CBC with diff:   Lab Results   Component Value Date    WBC 8 49 02/18/2018    WBC 4 1 02/27/2017    RBC 4 73 02/18/2018    RBC 4 75 02/27/2017    HGB 13 3 02/18/2018    HGB 13 6 02/27/2017    HCT 39 4 02/18/2018    HCT 40 1 02/27/2017    MCV 83 02/18/2018    MCV 84 4 02/27/2017    MCH 28 1 02/18/2018    MCH 28 6 02/27/2017    RDW 13 2 02/18/2018    RDW 14 4 02/27/2017     02/18/2018     02/27/2017       CMP:  Lab Results   Component Value Date    CREATININE 1 02 02/18/2018    CREATININE 0 89 10/12/2017    BUN 19 02/18/2018    BUN 18 10/12/2017     02/18/2018     10/12/2017    K 3 7 02/18/2018    K 4 0 10/12/2017     02/18/2018     10/12/2017    CO2 28 02/18/2018    CO2 26 10/12/2017    GLUCOSE 192 (H) 02/18/2018    PROT 6 8 10/12/2017    ALKPHOS 49 10/12/2017    ALT 22 10/12/2017    AST 26 10/12/2017       Lab Results   Component Value Date    HGBA1C 5 5 10/12/2017    MG 1 9 08/10/2017       Lab Results   Component Value Date    TROPONINI <0 02 02/18/2018       Lipid Profile:   Lab Results   Component Value Date    CHOL 133 10/12/2017    CHOL 191 08/10/2017    CHOL 155 02/27/2017     Lab Results   Component Value Date    HDL 12 (L) 10/12/2017    HDL 31 (L) 08/10/2017    HDL 7 (L) 02/27/2017     Lab Results   Component Value Date    LDLCALC 137 (H) 08/10/2017     Lab Results   Component Value Date    TRIG 226 (H) 10/12/2017    TRIG 114 08/10/2017    TRIG 437 (H) 02/27/2017       Medications:    Current Outpatient Prescriptions:     amLODIPine (NORVASC) 5 mg tablet, Take 1 tablet by mouth daily, Disp: , Rfl:     aspirin 81 mg chewable tablet, Chew 81 mg daily, Disp: , Rfl:     benzonatate (TESSALON PERLES) 100 mg capsule, Take 1 capsule (100 mg total) by mouth 3 (three) times a day as needed for cough, Disp: 21 capsule, Rfl: 0    clopidogrel (PLAVIX) 75 mg tablet, Take 1 tablet by mouth daily, Disp: , Rfl:     Coenzyme Q10 200 MG capsule, Take 200 mg by mouth daily, Disp: , Rfl:     escitalopram (LEXAPRO) 10 mg tablet, Take 1 tablet by mouth daily, Disp: , Rfl:     fenofibrate (TRICOR) 145 mg tablet, Take 145 mg by mouth daily, Disp: , Rfl:     fluticasone (FLONASE) 50 mcg/act nasal spray, 1 spray into each nostril daily as needed for rhinitis, Disp: 16 g, Rfl: 0    isosorbide mononitrate (IMDUR) 30 mg 24 hr tablet, Take 30 mg by mouth daily, Disp: , Rfl:     LORazepam (ATIVAN) 0 5 mg tablet, Take 1 mg by mouth daily as needed  , Disp: , Rfl:     metoprolol succinate (TOPROL-XL) 100 mg 24 hr tablet, Take 1 tablet by mouth daily, Disp: , Rfl:     niacin 250 MG tablet, Take 2 tablets by mouth daily, Disp: , Rfl:     nitroglycerin (NITROSTAT) 0 4 mg SL tablet, Place 0 4 mg under the tongue every 5 (five) minutes as needed  , Disp: , Rfl:     Omega-3 Fatty Acids (FISH OIL) 645 MG CAPS, Take by mouth Daily, Disp: , Rfl:     pravastatin (PRAVACHOL) 20 mg tablet, Take 1 tablet by mouth daily, Disp: , Rfl:     traZODone (DESYREL) 50 mg tablet, Take 25 mg by mouth  , Disp: , Rfl:     VITAMIN B COMPLEX-C PO, Take by mouth, Disp: , Rfl:     vitamin E, tocopherol, 200 units capsule, Take 200 Units by mouth daily, Disp: , Rfl:       Allergies: Allergies   Allergen Reactions    Iodinated Diagnostic Agents     Meloxicam     Omnipaque [Iohexol]      NEEDS BENADRYL BEFORE PROCEDURE         Assessment and Plan:  1  Coronary artery disease s/p CABG x 4 in 2011  Has provokable angina and after lengthy discussion of risks and benefits of all choices of medicines and of option of being referred to to CT surgery for possible revascularization patient elects medical therapy understands to report any problems promptly to medical attention  Continue aspirin, Plavix, beta-blocker, statin, CCB, Imdur  Added Ranexa 500 mg b i d  Today  Discussed use of SL NTG    3  Mixed hyperlipidemia   patient does not want to use niacin  Pravachol dose increased to 40 mg daily  Continue fenofibrate    4  HTN   BP soft  Will continue to monitor  If needed amlodipine dose can be decreased    Recommend aggressive risk factor modification and therapeutic lifestyle changes  Low-salt, low-calorie, low-fat, low-cholesterol diet with regular exercise and to optimize weight  I will defer the ordering and monitoring of necessity lab studies to you, but I am available and happy to review and manage any of the data at your request in the future  Discussed concepts of atherosclerosis, including signs and symptoms of cardiac disease  Previous studies were reviewed  Safety measures were reviewed  Questions were entertained and answered  Patient was advised to report any problems requiring medical attention  Follow-up with PCP and appropriate specialist and lab work as discussed  Return for follow up visit as scheduled or earlier, if needed  Thank you for allowing me to participate in the care and evaluation of your patient  Should you have any questions, please feel free to contact me        Sonia Cooper MD  3/9/2018,8:33 AM

## 2018-03-12 ENCOUNTER — TELEPHONE (OUTPATIENT)
Dept: CARDIOLOGY CLINIC | Facility: CLINIC | Age: 58
End: 2018-03-12

## 2018-03-12 NOTE — TELEPHONE ENCOUNTER
PT SEEN DR MONTELONGO ON 3/09/18 & WAS PUT ON RANEXA & PT WORKS MIDNIGHTS & HAD A REALLY BAD REACTION FROM THE MED; HEART POUNDING, HEAD SPINNING & POUNDING & PT SAID HE FELT EXHAUSTED LIKE HE JUST RAN A MARATHON  PT STOPPED TAKING THE RANEXA & HASNT TAKEN IT SINCE   PLEASE CALL PT TO DISCUSS

## 2018-04-05 DIAGNOSIS — F41.9 ANXIETY: Primary | ICD-10-CM

## 2018-04-06 RX ORDER — LORAZEPAM 0.5 MG/1
TABLET ORAL
Qty: 30 TABLET | Refills: 0 | Status: SHIPPED | OUTPATIENT
Start: 2018-04-06 | End: 2018-09-24 | Stop reason: SDUPTHER

## 2018-04-25 DIAGNOSIS — F19.10: Primary | ICD-10-CM

## 2018-04-25 DIAGNOSIS — F32.A DEPRESSION, UNSPECIFIED DEPRESSION TYPE: ICD-10-CM

## 2018-04-26 RX ORDER — ESCITALOPRAM OXALATE 10 MG/1
10 TABLET ORAL DAILY
Qty: 90 TABLET | Refills: 1 | Status: SHIPPED | OUTPATIENT
Start: 2018-04-26 | End: 2018-11-13 | Stop reason: SDUPTHER

## 2018-08-26 DIAGNOSIS — G47.00 INSOMNIA, UNSPECIFIED TYPE: Primary | ICD-10-CM

## 2018-08-27 RX ORDER — TRAZODONE HYDROCHLORIDE 50 MG/1
TABLET ORAL
Qty: 90 TABLET | Refills: 0 | Status: SHIPPED | OUTPATIENT
Start: 2018-08-27 | End: 2019-02-04 | Stop reason: SDUPTHER

## 2018-08-29 DIAGNOSIS — E78.2 MIXED HYPERLIPIDEMIA: Primary | ICD-10-CM

## 2018-08-29 DIAGNOSIS — I25.10 CORONARY ARTERY DISEASE INVOLVING NATIVE CORONARY ARTERY OF NATIVE HEART WITHOUT ANGINA PECTORIS: ICD-10-CM

## 2018-08-29 DIAGNOSIS — I10 ESSENTIAL HYPERTENSION: ICD-10-CM

## 2018-08-29 RX ORDER — FENOFIBRATE 145 MG/1
145 TABLET, COATED ORAL DAILY
Qty: 90 TABLET | Refills: 3 | Status: SHIPPED | OUTPATIENT
Start: 2018-08-29 | End: 2019-06-04

## 2018-08-29 RX ORDER — CLOPIDOGREL BISULFATE 75 MG/1
75 TABLET ORAL DAILY
Qty: 90 TABLET | Refills: 3 | Status: SHIPPED | OUTPATIENT
Start: 2018-08-29 | End: 2019-07-22 | Stop reason: SDUPTHER

## 2018-08-29 RX ORDER — AMLODIPINE BESYLATE 5 MG/1
5 TABLET ORAL DAILY
Qty: 90 TABLET | Refills: 3 | Status: SHIPPED | OUTPATIENT
Start: 2018-08-29 | End: 2019-01-17 | Stop reason: ALTCHOICE

## 2018-09-24 ENCOUNTER — OFFICE VISIT (OUTPATIENT)
Dept: FAMILY MEDICINE CLINIC | Facility: CLINIC | Age: 58
End: 2018-09-24
Payer: COMMERCIAL

## 2018-09-24 VITALS
HEART RATE: 58 BPM | SYSTOLIC BLOOD PRESSURE: 130 MMHG | OXYGEN SATURATION: 98 % | BODY MASS INDEX: 32.37 KG/M2 | DIASTOLIC BLOOD PRESSURE: 78 MMHG | TEMPERATURE: 97.6 F | WEIGHT: 201.4 LBS | HEIGHT: 66 IN

## 2018-09-24 DIAGNOSIS — I25.10 CORONARY ARTERY DISEASE INVOLVING NATIVE HEART WITHOUT ANGINA PECTORIS, UNSPECIFIED VESSEL OR LESION TYPE: ICD-10-CM

## 2018-09-24 DIAGNOSIS — F41.9 ANXIETY: ICD-10-CM

## 2018-09-24 DIAGNOSIS — L02.429 BOIL, LEG: ICD-10-CM

## 2018-09-24 DIAGNOSIS — Z12.5 SCREENING FOR PROSTATE CANCER: ICD-10-CM

## 2018-09-24 DIAGNOSIS — Z12.11 SCREEN FOR COLON CANCER: ICD-10-CM

## 2018-09-24 DIAGNOSIS — E78.2 MIXED HYPERLIPIDEMIA: ICD-10-CM

## 2018-09-24 DIAGNOSIS — F33.0 DEPRESSION, MAJOR, RECURRENT, MILD (HCC): ICD-10-CM

## 2018-09-24 DIAGNOSIS — G47.00 INSOMNIA, UNSPECIFIED TYPE: ICD-10-CM

## 2018-09-24 DIAGNOSIS — I25.10 CORONARY ARTERY DISEASE INVOLVING NATIVE HEART WITHOUT ANGINA PECTORIS, UNSPECIFIED VESSEL OR LESION TYPE: Primary | ICD-10-CM

## 2018-09-24 DIAGNOSIS — Z23 NEED FOR INFLUENZA VACCINATION: ICD-10-CM

## 2018-09-24 DIAGNOSIS — Z95.1 HX OF CABG: ICD-10-CM

## 2018-09-24 PROCEDURE — 1036F TOBACCO NON-USER: CPT | Performed by: PHYSICIAN ASSISTANT

## 2018-09-24 PROCEDURE — 3008F BODY MASS INDEX DOCD: CPT | Performed by: PHYSICIAN ASSISTANT

## 2018-09-24 PROCEDURE — 99214 OFFICE O/P EST MOD 30 MIN: CPT | Performed by: PHYSICIAN ASSISTANT

## 2018-09-24 PROCEDURE — 90682 RIV4 VACC RECOMBINANT DNA IM: CPT

## 2018-09-24 PROCEDURE — 90471 IMMUNIZATION ADMIN: CPT

## 2018-09-24 RX ORDER — LORAZEPAM 0.5 MG/1
0.5 TABLET ORAL DAILY PRN
Qty: 30 TABLET | Refills: 0 | Status: SHIPPED | OUTPATIENT
Start: 2018-09-24 | End: 2019-05-09 | Stop reason: ALTCHOICE

## 2018-09-24 RX ORDER — ISOSORBIDE MONONITRATE 30 MG/1
30 TABLET, EXTENDED RELEASE ORAL DAILY
Qty: 90 TABLET | Refills: 0 | Status: SHIPPED | OUTPATIENT
Start: 2018-09-24 | End: 2018-12-21 | Stop reason: SDUPTHER

## 2018-09-24 RX ORDER — METOPROLOL SUCCINATE 100 MG/1
50 TABLET, EXTENDED RELEASE ORAL DAILY
Qty: 90 TABLET | Refills: 0 | Status: SHIPPED | OUTPATIENT
Start: 2018-09-24 | End: 2018-09-25 | Stop reason: SDUPTHER

## 2018-09-24 RX ORDER — PRAVASTATIN SODIUM 40 MG
40 TABLET ORAL DAILY
Qty: 90 TABLET | Refills: 0 | Status: SHIPPED | OUTPATIENT
Start: 2018-09-24 | End: 2019-01-17 | Stop reason: SDUPTHER

## 2018-09-24 RX ORDER — CEPHALEXIN 500 MG/1
500 CAPSULE ORAL EVERY 12 HOURS SCHEDULED
Qty: 20 CAPSULE | Refills: 0 | Status: SHIPPED | OUTPATIENT
Start: 2018-09-24 | End: 2018-10-04

## 2018-09-24 NOTE — PROGRESS NOTES
Assessment/Plan:     Diagnoses and all orders for this visit:    Coronary artery disease involving native heart without angina pectoris, unspecified vessel or lesion type  Comments:   will referred to [facility] at when gap  Patient to call with what cardiac meds he needs refilled  Orders:  -     Comprehensive metabolic panel  -     Lipid panel  -     Ambulatory referral to Cardiology; Future    Anxiety  Comments:    Anxiety is stable  Orders:  -     LORazepam (ATIVAN) 0 5 mg tablet; Take 1 tablet (0 5 mg total) by mouth daily as needed for anxiety    Depression, major, recurrent, mild (HCC)  Comments:    Depression currently stable continue SSRI  Mixed hyperlipidemia  Comments:    Continue statin therapy and fibrate  Labs ordered  Orders:  -     Lipid panel    Insomnia, unspecified type  Comments:    Patient sleeping with trazodone 25-50 at night  Hx of CABG  -     Ambulatory referral to Cardiology; Future    Boil, leg  Comments:    Warm compresses p o  antibiotics ordered  Follow up if 1 week if not resolved  Orders:  -     cephalexin (KEFLEX) 500 mg capsule; Take 1 capsule (500 mg total) by mouth every 12 (twelve) hours for 10 days    Screening for prostate cancer  -     PSA, Total Screen; Future    Screen for colon cancer  -     Ambulatory referral to Colorectal Surgery; Future    Anxiety  -     LORazepam (ATIVAN) 0 5 mg tablet; Take 1 tablet (0 5 mg total) by mouth daily as needed for anxiety          Subjective:      Patient ID: Rayne Hernandez is a 63 y o  male  Patient presents with a pimple like lesion left upper inner groin  Red tender sore  Patient also has extensive cardiac history of coronary artery disease with history of CABG  Patient states his cardiologist retired  Patient saw the new cardiologist but he would like to have a different 1  Patient has been out of some of his cardiac meds  Will refill his meds ordered labs    Flu vaccine given today Prevnar 13 as a stock        The following portions of the patient's history were reviewed and updated as appropriate:   He  has a past medical history of Anxiety; Coronary artery disease; Depression; Hyperlipidemia; Nephrolithiasis; Rotator cuff tear; Sleep apnea; and [facility]    He   Patient Active Problem List    Diagnosis Date Noted    Boil, leg 09/24/2018    Screening for prostate cancer 09/24/2018    Screen for colon cancer 09/24/2018    Hx of CABG 03/09/2018    Low HDL (under 40) 11/17/2017    Depression, major, recurrent, mild ([facility] ) 08/21/2017    Chest pain 08/09/2017    Obesity 03/21/2016    Obstructive sleep apnea 04/14/2014    Mixed hyperlipidemia 08/22/2013    Anxiety 03/01/2012    Coronary artery disease involving native heart without angina pectoris 03/01/2012    Esophageal reflux 03/01/2012    Insomnia 03/01/2012    Restless legs syndrome 03/01/2012     Current Outpatient Prescriptions   Medication Sig Dispense Refill    amLODIPine (NORVASC) 5 mg tablet Take 1 tablet (5 mg total) by mouth daily 90 tablet 3    aspirin 81 mg chewable tablet Chew 81 mg daily      clopidogrel (PLAVIX) 75 mg tablet Take 1 tablet (75 mg total) by mouth daily 90 tablet 3    Coenzyme Q10 200 MG capsule Take 200 mg by mouth daily      escitalopram (LEXAPRO) 10 mg tablet Take 1 tablet (10 mg total) by mouth daily 90 tablet 1    fenofibrate (TRICOR) 145 mg tablet Take 1 tablet (145 mg total) by mouth daily 90 tablet 3    isosorbide mononitrate (IMDUR) 30 mg 24 hr tablet Take 30 mg by mouth daily      LORazepam (ATIVAN) 0 5 mg tablet Take 1 tablet (0 5 mg total) by mouth daily as needed for anxiety 30 tablet 0    metoprolol succinate (TOPROL-XL) 100 mg 24 hr tablet Take 1 tablet by mouth daily      nitroglycerin (NITROSTAT) 0 4 mg SL tablet Place 0 4 mg under the tongue every 5 (five) minutes as needed        Omega-3 Fatty Acids (FISH OIL) 645 MG CAPS Take by mouth Daily      pravastatin (PRAVACHOL) 40 mg tablet Take 1 tablet (40 mg total) by mouth daily 90 tablet 1    traZODone (DESYREL) 50 mg tablet TAKE 1/2 TABLET AT BEDTIME,INCREASE TO 1 TABLET AS    NEEDED 90 tablet 0    VITAMIN B COMPLEX-C PO Take by mouth      cephalexin (KEFLEX) 500 mg capsule Take 1 capsule (500 mg total) by mouth every 12 (twelve) hours for 10 days 20 capsule 0    ranolazine (RANEXA) 500 mg 12 hr tablet Take 1 tablet (500 mg total) by mouth 2 (two) times a day (Patient not taking: Reported on 9/24/2018 ) 90 tablet 1     No current facility-administered medications for this visit        Current Outpatient Prescriptions on File Prior to Visit   Medication Sig    amLODIPine (NORVASC) 5 mg tablet Take 1 tablet (5 mg total) by mouth daily    aspirin 81 mg chewable tablet Chew 81 mg daily    clopidogrel (PLAVIX) 75 mg tablet Take 1 tablet (75 mg total) by mouth daily    Coenzyme Q10 200 MG capsule Take 200 mg by mouth daily    escitalopram (LEXAPRO) 10 mg tablet Take 1 tablet (10 mg total) by mouth daily    fenofibrate (TRICOR) 145 mg tablet Take 1 tablet (145 mg total) by mouth daily    isosorbide mononitrate (IMDUR) 30 mg 24 hr tablet Take 30 mg by mouth daily    metoprolol succinate (TOPROL-XL) 100 mg 24 hr tablet Take 1 tablet by mouth daily    nitroglycerin (NITROSTAT) 0 4 mg SL tablet Place 0 4 mg under the tongue every 5 (five) minutes as needed      Omega-3 Fatty Acids (FISH OIL) 645 MG CAPS Take by mouth Daily    pravastatin (PRAVACHOL) 40 mg tablet Take 1 tablet (40 mg total) by mouth daily    traZODone (DESYREL) 50 mg tablet TAKE 1/2 TABLET AT BEDTIME,INCREASE TO 1 TABLET AS    NEEDED    VITAMIN B COMPLEX-C PO Take by mouth    [DISCONTINUED] LORazepam (ATIVAN) 0 5 mg tablet TAKE 1 TABLET BY MOUTH EVERY DAY    ranolazine (RANEXA) 500 mg 12 hr tablet Take 1 tablet (500 mg total) by mouth 2 (two) times a day (Patient not taking: Reported on 9/24/2018 )    [DISCONTINUED] benzonatate (TESSALON PERLES) 100 mg capsule Take 1 capsule (100 mg total) by mouth 3 (three) times a day as needed for cough    [DISCONTINUED] fluticasone (FLONASE) 50 mcg/act nasal spray 1 spray into each nostril daily as needed for rhinitis     No current facility-administered medications on file prior to visit  He is allergic to iodinated diagnostic agents; meloxicam; and omnipaque [iohexol]       Review of Systems   Constitutional: Negative for unexpected weight change  Respiratory: Negative for cough and shortness of breath  Cardiovascular: Negative for chest pain, palpitations and leg swelling  Gastrointestinal: Negative for constipation and diarrhea  Musculoskeletal: Negative for myalgias  Neurological: Negative for dizziness and headaches  Objective:        Physical Exam   Constitutional: He is oriented to person, place, and time  He appears well-developed and well-nourished  HENT:   Right Ear: Tympanic membrane, external ear and ear canal normal    Left Ear: Tympanic membrane, external ear and ear canal normal    Mouth/Throat: Oropharynx is clear and moist    Eyes: Conjunctivae are normal  Pupils are equal, round, and reactive to light  Neck: Neck supple  Carotid bruit is not present  No thyromegaly present  Cardiovascular: Normal rate and regular rhythm  Murmur heard  Pulmonary/Chest: Effort normal and breath sounds normal  He has no wheezes  Abdominal: Soft  Bowel sounds are normal  He exhibits no mass  There is no tenderness  Musculoskeletal: He exhibits no edema  Lymphadenopathy:     He has no cervical adenopathy  Neurological: He is alert and oriented to person, place, and time  Skin: Skin is warm and dry  Patient has red tender fluctuant oil left upper inner thigh lesion is not ready to be incised patient will apply warm compresses or soak in warm type of p o  Antibiotics ordered patient to wear loose fitting underwear  Psychiatric: He has a normal mood and affect   His behavior is normal  Judgment and thought content normal    Nursing note and vitals reviewed

## 2018-09-24 NOTE — TELEPHONE ENCOUNTER
Patient states Dr Porfirio Bell, his cardiologist had him down to 25mgs on the Metoprolol Succinate versus the 100mgs we have in our chart

## 2018-09-25 DIAGNOSIS — I25.10 CORONARY ARTERY DISEASE INVOLVING NATIVE HEART WITHOUT ANGINA PECTORIS, UNSPECIFIED VESSEL OR LESION TYPE: ICD-10-CM

## 2018-09-25 DIAGNOSIS — I25.10 CORONARY ARTERY DISEASE INVOLVING NATIVE CORONARY ARTERY OF NATIVE HEART WITHOUT ANGINA PECTORIS: Primary | ICD-10-CM

## 2018-09-25 RX ORDER — METOPROLOL SUCCINATE 25 MG/1
25 TABLET, EXTENDED RELEASE ORAL DAILY
Qty: 90 TABLET | Refills: 0 | Status: SHIPPED | OUTPATIENT
Start: 2018-09-25 | End: 2018-12-21 | Stop reason: SDUPTHER

## 2018-11-13 DIAGNOSIS — F32.A DEPRESSION, UNSPECIFIED DEPRESSION TYPE: ICD-10-CM

## 2018-11-13 RX ORDER — ESCITALOPRAM OXALATE 10 MG/1
TABLET ORAL
Qty: 90 TABLET | Refills: 1 | Status: SHIPPED | OUTPATIENT
Start: 2018-11-13 | End: 2019-05-09 | Stop reason: SDDI

## 2018-11-27 ENCOUNTER — TELEPHONE (OUTPATIENT)
Dept: FAMILY MEDICINE CLINIC | Facility: CLINIC | Age: 58
End: 2018-11-27

## 2018-11-27 LAB
ALBUMIN SERPL-MCNC: 4.8 G/DL (ref 3.6–5.1)
ALBUMIN/GLOB SERPL: 2.3 (CALC) (ref 1–2.5)
ALP SERPL-CCNC: 48 U/L (ref 40–115)
ALT SERPL-CCNC: 18 U/L (ref 9–46)
AST SERPL-CCNC: 21 U/L (ref 10–35)
BILIRUB SERPL-MCNC: 0.7 MG/DL (ref 0.2–1.2)
BUN SERPL-MCNC: 18 MG/DL (ref 7–25)
BUN/CREAT SERPL: ABNORMAL (CALC) (ref 6–22)
CALCIUM SERPL-MCNC: 9.6 MG/DL (ref 8.6–10.3)
CHLORIDE SERPL-SCNC: 106 MMOL/L (ref 98–110)
CHOLEST SERPL-MCNC: 107 MG/DL
CHOLEST/HDLC SERPL: 17.8 (CALC)
CO2 SERPL-SCNC: 28 MMOL/L (ref 20–32)
CREAT SERPL-MCNC: 0.97 MG/DL (ref 0.7–1.33)
GLOBULIN SER CALC-MCNC: 2.1 G/DL (CALC) (ref 1.9–3.7)
GLUCOSE SERPL-MCNC: 100 MG/DL (ref 65–99)
HDLC SERPL-MCNC: 6 MG/DL
LDLC SERPL CALC-MCNC: 66 MG/DL (CALC)
NONHDLC SERPL-MCNC: 101 MG/DL (CALC)
POTASSIUM SERPL-SCNC: 4.3 MMOL/L (ref 3.5–5.3)
PROT SERPL-MCNC: 6.9 G/DL (ref 6.1–8.1)
PSA SERPL-MCNC: 0.3 NG/ML
SL AMB EGFR AFRICAN AMERICAN: 100 ML/MIN/1.73M2
SL AMB EGFR NON AFRICAN AMERICAN: 86 ML/MIN/1.73M2
SODIUM SERPL-SCNC: 143 MMOL/L (ref 135–146)
TRIGL SERPL-MCNC: 267 MG/DL

## 2018-11-27 NOTE — TELEPHONE ENCOUNTER
Spoke with patient said he works nights and will not be able to come in till January   Will call to schedule when he wakes up more

## 2018-11-27 NOTE — TELEPHONE ENCOUNTER
----- Message from Cam Nunez PA-C sent at 11/27/2018  8:10 AM EST -----  Labs  Are  In   Pt  Due  For  Routine f/u  appt

## 2018-12-21 DIAGNOSIS — I25.10 CORONARY ARTERY DISEASE INVOLVING NATIVE HEART WITHOUT ANGINA PECTORIS, UNSPECIFIED VESSEL OR LESION TYPE: ICD-10-CM

## 2018-12-21 RX ORDER — METOPROLOL SUCCINATE 25 MG/1
TABLET, EXTENDED RELEASE ORAL
Qty: 90 TABLET | Refills: 0 | Status: SHIPPED | OUTPATIENT
Start: 2018-12-21 | End: 2019-01-17 | Stop reason: SDUPTHER

## 2018-12-21 RX ORDER — ISOSORBIDE MONONITRATE 30 MG/1
TABLET, EXTENDED RELEASE ORAL
Qty: 90 TABLET | Refills: 0 | Status: SHIPPED | OUTPATIENT
Start: 2018-12-21 | End: 2019-01-17 | Stop reason: SDUPTHER

## 2019-01-04 ENCOUNTER — TELEPHONE (OUTPATIENT)
Dept: CARDIOLOGY CLINIC | Facility: MEDICAL CENTER | Age: 59
End: 2019-01-04

## 2019-01-04 NOTE — TELEPHONE ENCOUNTER
Tried to call patient to ask about [facility] are [facility] [place] [facility] [place] [facility]  Will try again later

## 2019-01-15 NOTE — PROGRESS NOTES
Cardiology Outpatient Follow up Note    Fabiola Fields 63 y o  male MRN: [id number]    01/17/19          Assessment/Plan:    1  CAD  Ordered for aspirin, Plavix, statin, Metoprolol, Ranexa, and Imdur  He reports he is not taking Ranexa or Imdur  I represcribed Imdur and explained to him this is to control his anginal symptoms with exertion, he is agreeable to taking it, will reassess for any improvement in anginal symptoms at next visit, if no significant improvement could attempt to increase Imdur or add Ranexa  Thus far his LV function remains preserved, so I explained to him that we can continue medical management for now, but if LV function begins to decline, I would recommend he strongly reconsider redo CABG, he is open to this plan  2  HTN  Ordered for amlodipine, Metoprolol 100, and Imdur  Taking Metoprolol and Amlodipine  To reduce his overall number of medications, will continue Metoprolol at 100 mg dose (reports he is getting both 100 and 25 mg tablets, told him to renew only 100 mg tablets from now on) and Imdur for now and stop Amlodipine  Goal BP less than 140/90  3  HL  On Pravastatin 40 and fenofibrate 145 and fish oil 2 g daily  Lipid panel 11/18 showed total cholesterol 107, , HDL 6, LDL 66  Continue Pravastatin, fenofibrate, and asked him to increase fish oil to 4 g daily if tolerated to further lower his TG  He reports he will try to do so      4  LUI  Not using CPAP machine, too expensive for him to get equipment/accessories  1  Coronary artery disease without angina pectoris, unspecified vessel or lesion type, unspecified whether native or transplanted heart  POCT ECG   2  Essential hypertension     3  Coronary artery disease involving native heart without angina pectoris, unspecified vessel or lesion type  metoprolol succinate (TOPROL-XL) 100 mg 24 hr tablet    isosorbide mononitrate (IMDUR) 30 mg 24 hr tablet    pravastatin (PRAVACHOL) 40 mg tablet   4   Mixed hyperlipidemia  pravastatin (PRAVACHOL) 40 mg tablet       HPI: 63 y o  with a history of CAD s/p 4 vessel CABG 2011 at age 49, HTN, HL, LUI, who is here for follow up of CAD  Previously used to see Dr Bam Carrillo  Cardiac catheterization done for chest pain and abnormal stress echo showing inferior inducible ischemia 8/17 showed 100% proximal stenosis of LAD, occluded OM1, 90% stenosis of mid ramus  100% proximal stenosis of RCA  LIMA to LAD was patent  SVG to ramus was occluded, SVG to OM2 occluded, SVG to RCA had 100% stenosis at proximal anastomosis  He did not want to consider redo surgery, opted for medical management  He is not currently having any chest pain at rest, but is confused and frustrated about his medical regimen  He reports he did have episode of chest discomfort about a week ago  Was a sharp pain, occurred at rest, lasts for about 5 minutes a time  Has not tried taking SLNG because he does not want to open bottle and waste the rest of it  He reports at work he is able to go up 6 flight of stairs, does get some CP by the time he goes up to the top, but reports overall he has gotten better in terms of his symptoms/exercise tolerance  If he really pushes himself, he gets pain/pressure in his jaw  Can't do treadmill because of this  He has lost weight, about 15 lbs in the last 6 months, and feels that his has helped some of his symptoms  No orthopnea  No LE edema  No palpitations  No dizziness or lightheadedness          Patient Active Problem List   Diagnosis    Chest pain    Anxiety    Coronary artery disease involving native heart without angina pectoris    Depression, major, recurrent, mild (HCC)    Esophageal reflux    Mixed hyperlipidemia    Insomnia    Low HDL (under 40)    Obesity    Obstructive sleep apnea    Restless legs syndrome    Hx of CABG    Boil, leg    Screening for prostate cancer    Screen for colon cancer       Allergies   Allergen Reactions    Iodinated Diagnostic Agents     Meloxicam     Omnipaque [Iohexol]      NEEDS BENADRYL BEFORE PROCEDURE         Current Outpatient Prescriptions:     aspirin 81 mg chewable tablet, Chew 81 mg daily, Disp: , Rfl:     clopidogrel (PLAVIX) 75 mg tablet, Take 1 tablet (75 mg total) by mouth daily, Disp: 90 tablet, Rfl: 3    Coenzyme Q10 200 MG capsule, Take 200 mg by mouth daily, Disp: , Rfl:     Cyanocobalamin (VITAMIN B 12 PO), Take by mouth, Disp: , Rfl:     escitalopram (LEXAPRO) 10 mg tablet, TAKE 1 TABLET DAILY, Disp: 90 tablet, Rfl: 1    fenofibrate (TRICOR) 145 mg tablet, Take 1 tablet (145 mg total) by mouth daily, Disp: 90 tablet, Rfl: 3    LORazepam (ATIVAN) 0 5 mg tablet, Take 1 tablet (0 5 mg total) by mouth daily as needed for anxiety, Disp: 30 tablet, Rfl: 0    metoprolol succinate (TOPROL-XL) 100 mg 24 hr tablet, Take 1 tablet (100 mg total) by mouth daily, Disp: 90 tablet, Rfl: 3    Omega-3 Fatty Acids (FISH OIL) 645 MG CAPS, Take by mouth Daily, Disp: , Rfl:     traZODone (DESYREL) 50 mg tablet, TAKE 1/2 TABLET AT BEDTIME,INCREASE TO 1 TABLET AS    NEEDED, Disp: 90 tablet, Rfl: 0    isosorbide mononitrate (IMDUR) 30 mg 24 hr tablet, Take 1 tablet (30 mg total) by mouth daily, Disp: 90 tablet, Rfl: 3    nitroglycerin (NITROSTAT) 0 4 mg SL tablet, Place 0 4 mg under the tongue every 5 (five) minutes as needed  , Disp: , Rfl:     pravastatin (PRAVACHOL) 40 mg tablet, Take 1 tablet (40 mg total) by mouth daily at bedtime, Disp: 90 tablet, Rfl: 3    Past Medical History:   Diagnosis Date    Anxiety     Coronary artery disease     Depression     Hyperlipidemia     Nephrolithiasis     Rotator cuff tear     Sleep apnea     Subdural hemorrhage-concussion (HCC)        Family History   Problem Relation Age of Onset    Diabetes Mother         mellitus     Depression Mother     Stroke Father     Other Father         other lymphatic and hematopoietic neoplasms     Hypertension Family     Cancer Family     Arthritis Family        Past Surgical History:   Procedure Laterality Date    BRAIN SURGERY      CARDIAC CATHETERIZATION      CORONARY ARTERY BYPASS GRAFT      HERNIA REPAIR      INGUINAL HERNIA REPAIR      OTHER SURGICAL HISTORY      cardiac cath procedure outcome - successful     SHOULDER SURGERY      arthroscopy     TONSILLECTOMY      VASECTOMY         Social History     Social History    Marital status: /Civil Union     Spouse name: N/A    Number of children: N/A    Years of education: N/A     Occupational History    Not on file  Social History Main Topics    Smoking status: Former Smoker    Smokeless tobacco: Never Used    Alcohol use Yes      Comment: social     Drug use: No    Sexual activity: Not on file     Other Topics Concern    Not on file     Social History Narrative    Caffeine use        Review of Systems   Constitution: Positive for weight loss  Negative for chills, decreased appetite, diaphoresis, fever, weakness, malaise/fatigue, night sweats and weight gain  HENT: Negative for ear pain, hearing loss, hoarse voice, nosebleeds, sore throat and tinnitus  Eyes: Negative for blurred vision and pain  Cardiovascular: Positive for chest pain and dyspnea on exertion  Negative for claudication, cyanosis, irregular heartbeat, leg swelling, near-syncope, orthopnea, palpitations, paroxysmal nocturnal dyspnea and syncope  Respiratory: Positive for cough  Negative for hemoptysis, shortness of breath, sleep disturbances due to breathing, snoring, sputum production and wheezing  Hematologic/Lymphatic: Negative for adenopathy and bleeding problem  Does not bruise/bleed easily  Skin: Positive for rash  Negative for color change, dry skin, flushing, itching and poor wound healing  Musculoskeletal: Positive for arthritis, back pain and joint pain  Negative for falls, muscle cramps, muscle weakness, myalgias and neck pain     Gastrointestinal: Negative for abdominal pain, constipation, diarrhea, dysphagia, heartburn, hematemesis, hematochezia, melena, nausea and vomiting  Genitourinary: Negative for dysuria, frequency, hematuria, hesitancy, non-menstrual bleeding and urgency  Neurological: Positive for headaches and numbness  Negative for excessive daytime sleepiness, dizziness, focal weakness, light-headedness, loss of balance, paresthesias, tremors and vertigo  Psychiatric/Behavioral: Negative for altered mental status, depression and memory loss  The patient does not have insomnia and is not nervous/anxious  Allergic/Immunologic: Negative for environmental allergies and persistent infections  Vitals: /68 (BP Location: Left arm, Patient Position: Sitting, Cuff Size: Large)   Pulse 68   Ht 5' 6" (1 676 m)   Wt 85 5 kg (188 lb 6 4 oz)   SpO2 96%   BMI 30 41 kg/m²     Physical Exam:     GEN: Alert and oriented x 3, in no acute distress  Well appearing and well nourished  HEENT: Sclera anicteric, conjunctivae pink, mucous membranes moist  Oropharynx clear  NECK: Supple, no carotid bruits, no significant JVD  Trachea midline, no thyromegaly  HEART: Regular rhythm, normal S1 and S2, no murmurs, clicks, gallops or rubs  PMI nondisplaced, no thrills  LUNGS: Clear to auscultation bilaterally; no wheezes, rales, or rhonchi  No increased work of breathing or signs of respiratory distress  ABDOMEN: Soft, nontender, nondistended, normoactive bowel sounds  EXTREMITIES: Skin warm and well perfused, no clubbing, cyanosis, or edema  NEURO: No focal findings  Normal gait  Normal speech  Mood and affect normal    SKIN: Normal without suspicious lesions on exposed skin        Lab Results:       Lab Results   Component Value Date    HGBA1C 5 5 10/12/2017    HGBA1C 5 8 (H) 02/27/2017    HGBA1C 5 6 04/08/2016     Lab Results   Component Value Date    CHOL 133 10/12/2017    CHOL 155 02/27/2017     Lab Results   Component Value Date    HDL 6 (L) 11/26/2018    HDL 12 (L) 10/12/2017    HDL 31 (L) 08/10/2017     Lab Results   Component Value Date    LDLCALC 137 (H) 08/10/2017     Lab Results   Component Value Date    TRIG 267 (H) 11/26/2018    TRIG 226 (H) 10/12/2017    TRIG 114 08/10/2017     No results found for: CHOLHDL

## 2019-01-17 ENCOUNTER — OFFICE VISIT (OUTPATIENT)
Dept: CARDIOLOGY CLINIC | Facility: MEDICAL CENTER | Age: 59
End: 2019-01-17
Payer: COMMERCIAL

## 2019-01-17 VITALS
HEART RATE: 68 BPM | BODY MASS INDEX: 30.28 KG/M2 | SYSTOLIC BLOOD PRESSURE: 140 MMHG | HEIGHT: 66 IN | OXYGEN SATURATION: 96 % | DIASTOLIC BLOOD PRESSURE: 68 MMHG | WEIGHT: 188.4 LBS

## 2019-01-17 DIAGNOSIS — I25.10 CORONARY ARTERY DISEASE WITHOUT ANGINA PECTORIS, UNSPECIFIED VESSEL OR LESION TYPE, UNSPECIFIED WHETHER NATIVE OR TRANSPLANTED HEART: Primary | ICD-10-CM

## 2019-01-17 DIAGNOSIS — I25.10 CORONARY ARTERY DISEASE INVOLVING NATIVE HEART WITHOUT ANGINA PECTORIS, UNSPECIFIED VESSEL OR LESION TYPE: ICD-10-CM

## 2019-01-17 DIAGNOSIS — E78.2 MIXED HYPERLIPIDEMIA: ICD-10-CM

## 2019-01-17 DIAGNOSIS — I10 ESSENTIAL HYPERTENSION: ICD-10-CM

## 2019-01-17 PROCEDURE — 99215 OFFICE O/P EST HI 40 MIN: CPT | Performed by: INTERNAL MEDICINE

## 2019-01-17 PROCEDURE — 93000 ELECTROCARDIOGRAM COMPLETE: CPT | Performed by: INTERNAL MEDICINE

## 2019-01-17 RX ORDER — METOPROLOL SUCCINATE 100 MG/1
100 TABLET, EXTENDED RELEASE ORAL DAILY
Qty: 90 TABLET | Refills: 3 | Status: SHIPPED | OUTPATIENT
Start: 2019-01-17 | End: 2020-01-02

## 2019-01-17 RX ORDER — PRAVASTATIN SODIUM 40 MG
40 TABLET ORAL
Qty: 90 TABLET | Refills: 3 | Status: SHIPPED | OUTPATIENT
Start: 2019-01-17 | End: 2020-01-02

## 2019-01-17 RX ORDER — ISOSORBIDE MONONITRATE 30 MG/1
30 TABLET, EXTENDED RELEASE ORAL DAILY
Qty: 90 TABLET | Refills: 3 | Status: SHIPPED | OUTPATIENT
Start: 2019-01-17 | End: 2020-01-02

## 2019-02-01 ENCOUNTER — OFFICE VISIT (OUTPATIENT)
Dept: FAMILY MEDICINE CLINIC | Facility: CLINIC | Age: 59
End: 2019-02-01
Payer: COMMERCIAL

## 2019-02-01 VITALS
HEIGHT: 66 IN | DIASTOLIC BLOOD PRESSURE: 76 MMHG | OXYGEN SATURATION: 96 % | SYSTOLIC BLOOD PRESSURE: 132 MMHG | TEMPERATURE: 97.9 F | WEIGHT: 191 LBS | BODY MASS INDEX: 30.7 KG/M2 | HEART RATE: 52 BPM

## 2019-02-01 DIAGNOSIS — J40 BRONCHITIS: Primary | ICD-10-CM

## 2019-02-01 PROCEDURE — 99213 OFFICE O/P EST LOW 20 MIN: CPT | Performed by: PHYSICIAN ASSISTANT

## 2019-02-01 PROCEDURE — 3008F BODY MASS INDEX DOCD: CPT | Performed by: PHYSICIAN ASSISTANT

## 2019-02-01 PROCEDURE — 1036F TOBACCO NON-USER: CPT | Performed by: PHYSICIAN ASSISTANT

## 2019-02-01 RX ORDER — ALBUTEROL SULFATE 90 UG/1
2 AEROSOL, METERED RESPIRATORY (INHALATION) EVERY 6 HOURS PRN
COMMUNITY
End: 2019-05-09 | Stop reason: ALTCHOICE

## 2019-02-01 RX ORDER — PREDNISONE 10 MG/1
10 TABLET ORAL 2 TIMES DAILY WITH MEALS
Qty: 10 TABLET | Refills: 0 | Status: SHIPPED | OUTPATIENT
Start: 2019-02-01 | End: 2019-05-09 | Stop reason: ALTCHOICE

## 2019-02-01 RX ORDER — AZITHROMYCIN 250 MG/1
TABLET, FILM COATED ORAL
Qty: 6 TABLET | Refills: 0 | Status: SHIPPED | OUTPATIENT
Start: 2019-02-01 | End: 2019-02-05

## 2019-02-01 RX ORDER — BENZONATATE 100 MG/1
100 CAPSULE ORAL 3 TIMES DAILY PRN
Qty: 20 CAPSULE | Refills: 0 | Status: SHIPPED | OUTPATIENT
Start: 2019-02-01 | End: 2019-05-09 | Stop reason: ALTCHOICE

## 2019-02-01 NOTE — PROGRESS NOTES
Assessment/Plan:     Diagnoses and all orders for this visit:    Bronchitis  Comments:  Patient to rest increase fluids  P o  Azithromycin and steroids ordered  Patient has a Proventil HFA at home  Tessalon Perles ordered as well  Follow up if   Orders:  -     azithromycin (ZITHROMAX) 250 mg tablet; Take 2 tablets today then 1 tablet daily x 4 days  -     predniSONE 10 mg tablet; Take 1 tablet (10 mg total) by mouth 2 (two) times a day with meals  -     benzonatate (TESSALON PERLES) 100 mg capsule; Take 1 capsule (100 mg total) by mouth 3 (three) times a day as needed for cough    Other orders  -     albuterol (PROVENTIL HFA,VENTOLIN HFA) 90 mcg/act inhaler; Inhale 2 puffs every 6 (six) hours as needed          Subjective:      Patient ID: Santiago Lester is a 63 y o  male  Patient presents with cough  Patient states he was out in the winter snow on Tuesday and then he has developed a cough  Nasal congestion her is feel clogged throats nontender  No fever  No over-the-counter meds taken  The following portions of the patient's history were reviewed and updated as appropriate:   He  has a past medical history of Anxiety; Coronary artery disease; Depression; Hyperlipidemia; Nephrolithiasis; Rotator cuff tear; Sleep apnea; and Subdural hemorrhage-concussion ([facility] [address] )    He   Patient Active Problem List    Diagnosis Date Noted    Bronchitis 02/01/2019    Boil, leg 09/24/2018    Screening for prostate cancer 09/24/2018    Screen for colon cancer 09/24/2018    Hx of CABG 03/09/2018    Low HDL (under 40) 11/17/2017    Depression, major, recurrent, mild ([address] ) 08/21/2017    Chest pain 08/09/2017    Obesity 03/21/2016    Obstructive sleep apnea 04/14/2014    Mixed hyperlipidemia 08/22/2013    Anxiety 03/01/2012    Coronary artery disease involving native heart without angina pectoris 03/01/2012    Esophageal reflux 03/01/2012    Insomnia 03/01/2012    Restless legs syndrome 03/01/2012     Current Outpatient Prescriptions   Medication Sig Dispense Refill    albuterol (PROVENTIL HFA,VENTOLIN HFA) 90 mcg/act inhaler Inhale 2 puffs every 6 (six) hours as needed      aspirin 81 mg chewable tablet Chew 81 mg daily      azithromycin (ZITHROMAX) 250 mg tablet Take 2 tablets today then 1 tablet daily x 4 days 6 tablet 0    benzonatate (TESSALON PERLES) 100 mg capsule Take 1 capsule (100 mg total) by mouth 3 (three) times a day as needed for cough 20 capsule 0    clopidogrel (PLAVIX) 75 mg tablet Take 1 tablet (75 mg total) by mouth daily 90 tablet 3    Coenzyme Q10 200 MG capsule Take 200 mg by mouth daily      Cyanocobalamin (VITAMIN B 12 PO) Take by mouth      escitalopram (LEXAPRO) 10 mg tablet TAKE 1 TABLET DAILY 90 tablet 1    fenofibrate (TRICOR) 145 mg tablet Take 1 tablet (145 mg total) by mouth daily 90 tablet 3    isosorbide mononitrate (IMDUR) 30 mg 24 hr tablet Take 1 tablet (30 mg total) by mouth daily 90 tablet 3    LORazepam (ATIVAN) 0 5 mg tablet Take 1 tablet (0 5 mg total) by mouth daily as needed for anxiety 30 tablet 0    metoprolol succinate (TOPROL-XL) 100 mg 24 hr tablet Take 1 tablet (100 mg total) by mouth daily 90 tablet 3    nitroglycerin (NITROSTAT) 0 4 mg SL tablet Place 0 4 mg under the tongue every 5 (five) minutes as needed        Omega-3 Fatty Acids (FISH OIL) 645 MG CAPS Take by mouth Daily      pravastatin (PRAVACHOL) 40 mg tablet Take 1 tablet (40 mg total) by mouth daily at bedtime 90 tablet 3    predniSONE 10 mg tablet Take 1 tablet (10 mg total) by mouth 2 (two) times a day with meals 10 tablet 0    traZODone (DESYREL) 50 mg tablet TAKE 1/2 TABLET AT BEDTIME,INCREASE TO 1 TABLET AS    NEEDED 90 tablet 0     No current facility-administered medications for this visit        Current Outpatient Prescriptions on File Prior to Visit   Medication Sig    aspirin 81 mg chewable tablet Chew 81 mg daily    clopidogrel (PLAVIX) 75 mg tablet Take 1 tablet (75 mg total) by mouth daily    Coenzyme Q10 200 MG capsule Take 200 mg by mouth daily    Cyanocobalamin (VITAMIN B 12 PO) Take by mouth    escitalopram (LEXAPRO) 10 mg tablet TAKE 1 TABLET DAILY    fenofibrate (TRICOR) 145 mg tablet Take 1 tablet (145 mg total) by mouth daily    isosorbide mononitrate (IMDUR) 30 mg 24 hr tablet Take 1 tablet (30 mg total) by mouth daily    LORazepam (ATIVAN) 0 5 mg tablet Take 1 tablet (0 5 mg total) by mouth daily as needed for anxiety    metoprolol succinate (TOPROL-XL) 100 mg 24 hr tablet Take 1 tablet (100 mg total) by mouth daily    nitroglycerin (NITROSTAT) 0 4 mg SL tablet Place 0 4 mg under the tongue every 5 (five) minutes as needed      Omega-3 Fatty Acids (FISH OIL) 645 MG CAPS Take by mouth Daily    pravastatin (PRAVACHOL) 40 mg tablet Take 1 tablet (40 mg total) by mouth daily at bedtime    traZODone (DESYREL) 50 mg tablet TAKE 1/2 TABLET AT BEDTIME,INCREASE TO 1 TABLET AS    NEEDED     No current facility-administered medications on file prior to visit  He is allergic to iodinated diagnostic agents; meloxicam; and omnipaque [iohexol]       Review of Systems   Constitutional: Positive for fatigue  Negative for activity change, appetite change, chills and fever  HENT: Positive for ear pain, postnasal drip and rhinorrhea  Negative for sinus pain, sinus pressure, sneezing and sore throat  Respiratory: Positive for cough  Objective:        Physical Exam   Constitutional: He is oriented to person, place, and time  He appears well-developed and well-nourished  No distress  HENT:   Head: Normocephalic and atraumatic  Right Ear: Tympanic membrane, external ear and ear canal normal    Left Ear: Tympanic membrane, external ear and ear canal normal    Nose: Nose normal  No rhinorrhea  Right sinus exhibits no maxillary sinus tenderness and no frontal sinus tenderness  Left sinus exhibits no maxillary sinus tenderness and no frontal sinus tenderness     Mouth/Throat: Oropharynx is clear and moist  No oropharyngeal exudate or posterior oropharyngeal erythema  Eyes: Pupils are equal, round, and reactive to light  Conjunctivae are normal    Neck: Normal range of motion  Neck supple  Cardiovascular: Normal rate, regular rhythm and normal heart sounds  No murmur heard  Pulmonary/Chest: Effort normal and breath sounds normal  No respiratory distress  He has no wheezes  He has no rales  Rhonchi right base  Rhonchi right anterior chest   Possible egophony right lower lobe  Patient does have a spasmodic cough  Musculoskeletal: Normal range of motion  Lymphadenopathy:     He has no cervical adenopathy  Neurological: He is alert and oriented to person, place, and time  Skin: Skin is warm and dry  No rash noted  He is not diaphoretic  No erythema  Psychiatric: He has a normal mood and affect  His behavior is normal  Judgment and thought content normal    Nursing note and vitals reviewed

## 2019-02-04 DIAGNOSIS — G47.00 INSOMNIA, UNSPECIFIED TYPE: ICD-10-CM

## 2019-02-04 RX ORDER — TRAZODONE HYDROCHLORIDE 50 MG/1
TABLET ORAL
Qty: 90 TABLET | Refills: 0 | Status: SHIPPED | OUTPATIENT
Start: 2019-02-04 | End: 2019-05-09 | Stop reason: SDUPTHER

## 2019-02-05 DIAGNOSIS — E78.2 MIXED HYPERLIPIDEMIA: ICD-10-CM

## 2019-02-05 DIAGNOSIS — I25.10 CORONARY ARTERY DISEASE INVOLVING NATIVE HEART WITHOUT ANGINA PECTORIS, UNSPECIFIED VESSEL OR LESION TYPE: ICD-10-CM

## 2019-02-05 RX ORDER — PRAVASTATIN SODIUM 40 MG
40 TABLET ORAL
Qty: 90 TABLET | Refills: 3 | Status: CANCELLED | OUTPATIENT
Start: 2019-02-05

## 2019-04-22 DIAGNOSIS — F32.A DEPRESSION, UNSPECIFIED DEPRESSION TYPE: ICD-10-CM

## 2019-05-09 ENCOUNTER — OFFICE VISIT (OUTPATIENT)
Dept: FAMILY MEDICINE CLINIC | Facility: CLINIC | Age: 59
End: 2019-05-09
Payer: COMMERCIAL

## 2019-05-09 VITALS
RESPIRATION RATE: 16 BRPM | HEIGHT: 66 IN | BODY MASS INDEX: 31.02 KG/M2 | OXYGEN SATURATION: 95 % | TEMPERATURE: 98.2 F | DIASTOLIC BLOOD PRESSURE: 60 MMHG | SYSTOLIC BLOOD PRESSURE: 110 MMHG | HEART RATE: 56 BPM | WEIGHT: 193 LBS

## 2019-05-09 DIAGNOSIS — M54.16 LUMBAR RADICULOPATHY, RIGHT: ICD-10-CM

## 2019-05-09 DIAGNOSIS — M25.511 ACUTE PAIN OF RIGHT SHOULDER: ICD-10-CM

## 2019-05-09 DIAGNOSIS — E78.2 MIXED HYPERLIPIDEMIA: ICD-10-CM

## 2019-05-09 DIAGNOSIS — M25.561 ACUTE PAIN OF RIGHT KNEE: ICD-10-CM

## 2019-05-09 DIAGNOSIS — I25.10 CORONARY ARTERY DISEASE INVOLVING NATIVE CORONARY ARTERY OF NATIVE HEART WITHOUT ANGINA PECTORIS: Primary | ICD-10-CM

## 2019-05-09 DIAGNOSIS — G47.00 INSOMNIA, UNSPECIFIED TYPE: ICD-10-CM

## 2019-05-09 DIAGNOSIS — F33.0 DEPRESSION, MAJOR, RECURRENT, MILD (HCC): ICD-10-CM

## 2019-05-09 DIAGNOSIS — F41.9 ANXIETY: ICD-10-CM

## 2019-05-09 PROBLEM — J40 BRONCHITIS: Status: RESOLVED | Noted: 2019-02-01 | Resolved: 2019-05-09

## 2019-05-09 PROBLEM — R07.9 CHEST PAIN: Chronic | Status: RESOLVED | Noted: 2017-08-09 | Resolved: 2019-05-09

## 2019-05-09 PROBLEM — L02.429 BOIL, LEG: Status: RESOLVED | Noted: 2018-09-24 | Resolved: 2019-05-09

## 2019-05-09 PROCEDURE — 99214 OFFICE O/P EST MOD 30 MIN: CPT | Performed by: PHYSICIAN ASSISTANT

## 2019-05-09 RX ORDER — PREDNISONE 10 MG/1
10 TABLET ORAL 2 TIMES DAILY WITH MEALS
Qty: 10 TABLET | Refills: 0 | Status: SHIPPED | OUTPATIENT
Start: 2019-05-09 | End: 2019-06-12

## 2019-05-09 RX ORDER — TRAZODONE HYDROCHLORIDE 50 MG/1
TABLET ORAL
Qty: 90 TABLET | Refills: 0 | Status: SHIPPED | OUTPATIENT
Start: 2019-05-09 | End: 2020-08-29 | Stop reason: SDUPTHER

## 2019-05-14 ENCOUNTER — APPOINTMENT (OUTPATIENT)
Dept: RADIOLOGY | Facility: MEDICAL CENTER | Age: 59
End: 2019-05-14
Payer: COMMERCIAL

## 2019-05-14 ENCOUNTER — OFFICE VISIT (OUTPATIENT)
Dept: OBGYN CLINIC | Facility: MEDICAL CENTER | Age: 59
End: 2019-05-14
Payer: COMMERCIAL

## 2019-05-14 VITALS
WEIGHT: 197 LBS | HEIGHT: 66 IN | SYSTOLIC BLOOD PRESSURE: 129 MMHG | DIASTOLIC BLOOD PRESSURE: 78 MMHG | HEART RATE: 47 BPM | BODY MASS INDEX: 31.66 KG/M2

## 2019-05-14 DIAGNOSIS — M25.561 ACUTE PAIN OF RIGHT KNEE: ICD-10-CM

## 2019-05-14 DIAGNOSIS — G89.29 CHRONIC RIGHT-SIDED LOW BACK PAIN WITH RIGHT-SIDED SCIATICA: ICD-10-CM

## 2019-05-14 DIAGNOSIS — M54.41 CHRONIC RIGHT-SIDED LOW BACK PAIN WITH RIGHT-SIDED SCIATICA: ICD-10-CM

## 2019-05-14 DIAGNOSIS — G89.29 CHRONIC RIGHT-SIDED LOW BACK PAIN WITH RIGHT-SIDED SCIATICA: Primary | ICD-10-CM

## 2019-05-14 DIAGNOSIS — M54.41 CHRONIC RIGHT-SIDED LOW BACK PAIN WITH RIGHT-SIDED SCIATICA: Primary | ICD-10-CM

## 2019-05-14 DIAGNOSIS — M25.511 ACUTE PAIN OF RIGHT SHOULDER: ICD-10-CM

## 2019-05-14 PROCEDURE — 72100 X-RAY EXAM L-S SPINE 2/3 VWS: CPT

## 2019-05-14 PROCEDURE — 99243 OFF/OP CNSLTJ NEW/EST LOW 30: CPT | Performed by: EMERGENCY MEDICINE

## 2019-05-14 RX ORDER — PREDNISONE 20 MG/1
TABLET ORAL
Qty: 18 TABLET | Refills: 0 | Status: SHIPPED | OUTPATIENT
Start: 2019-05-14 | End: 2019-06-12

## 2019-06-04 DIAGNOSIS — E78.2 MIXED HYPERLIPIDEMIA: ICD-10-CM

## 2019-06-04 RX ORDER — FENOFIBRATE 145 MG/1
145 TABLET, COATED ORAL DAILY
Qty: 90 TABLET | Refills: 3 | Status: SHIPPED | OUTPATIENT
Start: 2019-06-04 | End: 2020-05-19

## 2019-06-12 ENCOUNTER — OFFICE VISIT (OUTPATIENT)
Dept: OBGYN CLINIC | Facility: MEDICAL CENTER | Age: 59
End: 2019-06-12
Payer: COMMERCIAL

## 2019-06-12 VITALS
WEIGHT: 190.2 LBS | HEIGHT: 66 IN | SYSTOLIC BLOOD PRESSURE: 137 MMHG | DIASTOLIC BLOOD PRESSURE: 78 MMHG | BODY MASS INDEX: 30.57 KG/M2 | HEART RATE: 49 BPM

## 2019-06-12 DIAGNOSIS — M54.41 CHRONIC RIGHT-SIDED LOW BACK PAIN WITH RIGHT-SIDED SCIATICA: Primary | ICD-10-CM

## 2019-06-12 DIAGNOSIS — M25.561 ACUTE PAIN OF RIGHT KNEE: ICD-10-CM

## 2019-06-12 DIAGNOSIS — G89.29 CHRONIC RIGHT-SIDED LOW BACK PAIN WITH RIGHT-SIDED SCIATICA: Primary | ICD-10-CM

## 2019-06-12 PROCEDURE — 99212 OFFICE O/P EST SF 10 MIN: CPT | Performed by: EMERGENCY MEDICINE

## 2019-07-19 NOTE — PROGRESS NOTES
Cardiology Outpatient Follow up Note    Margaret Shaffer 63 y o  male MRN: [id number]    07/22/19          Assessment/Plan:    1  CAD  Taking aspirin, Plavix, statin, Metoprolol, and Imdur  He reports he is taking Imdur 30 currently  Still gets some chest discomfort at least once a week  His BP is borderline low, so will likely not be able to increase Imdur any further  He is agreeable to trying Ranexa 500 bid  Thus far his LV function remains preserved, so I explained to him that we can continue medical management for now, but if LV function begins to decline, I would recommend he strongly reconsider redo CABG, he is open to this plan  2  HTN  On Metoprolol 100, and Imdur 30  Goal BP less than 140/90  BP controlled in office today  3  HL  On Pravastatin 40 and fenofibrate 145 and fish oil 2 g daily  Lipid panel 11/18 showed total cholesterol 107, , HDL 6, LDL 66  Continue Pravastatin, fenofibrate, and asked him to increase fish oil to 4 g daily if tolerated to further lower his TG  Lipid panel 7/19 showed total cholesterol 98, , HDL 15, LDL 57      4  LUI  Not using CPAP machine, too expensive for him to get equipment/accessories  1  Obstructive sleep apnea     2  Coronary artery disease involving native coronary artery of native heart without angina pectoris  clopidogrel (PLAVIX) 75 mg tablet    ranolazine (RANEXA) 500 mg 12 hr tablet   3  Mixed hyperlipidemia     4  Hx of CABG         HPI: 63 y o  with a history of CAD s/p 4 vessel CABG 2011 at age 49, HTN, HL, LUI, who is here for follow up of CAD  Previously used to see Dr Neysa Apley  Cardiac catheterization done for chest pain and abnormal stress echo showing inferior inducible ischemia 8/17 showed 100% proximal stenosis of LAD, occluded OM1, 90% stenosis of mid ramus  100% proximal stenosis of RCA  LIMA to LAD was patent  SVG to ramus was occluded, SVG to OM2 occluded, SVG to RCA had 100% stenosis at proximal anastomosis   He did not want to consider redo surgery, opted for medical management  He is not currently having any chest pain at rest  He reports he gets chest pain at least once a week, not always with exertion  Lasts couple minutes, just relaxes a little and it resolves on its own  Has not tried taking SLNG because he does not want to open bottle and waste the rest of it  He reports at work he is able to go up 6 flight of stairs, does get some SOB by the time he goes up to the top, but no CP  No pain/pressure in his jaw  Has not tried doing treadmill with incline because of concern it will cause CP  No orthopnea  No LE edema  No palpitations  No dizziness or lightheadedness  Occasional headaches  He tried taking Escitalopram for being emotional, but he felt it wasn't helping, but then felt worse when he got off of it  Thinking about restarting it long term      Patient Active Problem List   Diagnosis    Anxiety    Coronary artery disease involving native heart without angina pectoris    Depression, major, recurrent, mild (HCC)    Esophageal reflux    Mixed hyperlipidemia    Insomnia    Low HDL (under 40)    Obesity    Obstructive sleep apnea    Restless legs syndrome    Hx of CABG    Screening for prostate cancer    Screen for colon cancer    Lumbar radiculopathy, right    Acute pain of right knee    Acute pain of right shoulder       Allergies   Allergen Reactions    Iodinated Diagnostic Agents     Meloxicam     Omnipaque [Iohexol]      NEEDS BENADRYL BEFORE PROCEDURE         Current Outpatient Medications:     Ascorbic Acid (VITAMIN C PO), Take by mouth, Disp: , Rfl:     aspirin 81 mg chewable tablet, Chew 81 mg daily, Disp: , Rfl:     clopidogrel (PLAVIX) 75 mg tablet, Take 1 tablet (75 mg total) by mouth daily, Disp: 90 tablet, Rfl: 3    Coenzyme Q10 200 MG capsule, Take 200 mg by mouth daily, Disp: , Rfl:     Cyanocobalamin (VITAMIN B 12 PO), Take by mouth, Disp: , Rfl:     fenofibrate (TRICOR) 145 mg tablet, Take 1 tablet (145 mg total) by mouth daily, Disp: 90 tablet, Rfl: 3    isosorbide mononitrate (IMDUR) 30 mg 24 hr tablet, Take 1 tablet (30 mg total) by mouth daily, Disp: 90 tablet, Rfl: 3    metoprolol succinate (TOPROL-XL) 100 mg 24 hr tablet, Take 1 tablet (100 mg total) by mouth daily, Disp: 90 tablet, Rfl: 3    nitroglycerin (NITROSTAT) 0 4 mg SL tablet, Place 0 4 mg under the tongue every 5 (five) minutes as needed  , Disp: , Rfl:     Omega-3 Fatty Acids (FISH OIL) 645 MG CAPS, Take by mouth Daily, Disp: , Rfl:     pravastatin (PRAVACHOL) 40 mg tablet, Take 1 tablet (40 mg total) by mouth daily at bedtime, Disp: 90 tablet, Rfl: 3    traZODone (DESYREL) 50 mg tablet, Take 1/2 tablet at bedtime  Increase to 1 tablet as needed  , Disp: 90 tablet, Rfl: 0    ranolazine (RANEXA) 500 mg 12 hr tablet, Take 1 tablet (500 mg total) by mouth 2 (two) times a day, Disp: 180 tablet, Rfl: 3    Past Medical History:   Diagnosis Date    Anxiety     Coronary artery disease     Depression     Hyperlipidemia     Nephrolithiasis     Rotator cuff tear     Sleep apnea     Subdural hemorrhage-concussion ([facility] [address] )        Family History   Problem Relation Age of Onset    Diabetes Mother         mellitus     Depression Mother     Stroke Father     Other Father         other lymphatic and hematopoietic neoplasms     Hypertension Family     Cancer Family     Arthritis Family        Past Surgical History:   Procedure Laterality Date    BRAIN SURGERY      CARDIAC CATHETERIZATION      CORONARY ARTERY BYPASS GRAFT      HERNIA REPAIR      INGUINAL HERNIA REPAIR      OTHER SURGICAL HISTORY      cardiac cath procedure outcome - successful     SHOULDER SURGERY      arthroscopy     TONSILLECTOMY      VASECTOMY         Social History     Socioeconomic History    Marital status:      Spouse name: Not on file    Number of children: Not on file    Years of education: Not on file    Highest education level: Not on file   Occupational History    Not on file   Social Needs    Financial resource strain: Not on file    Food insecurity:     Worry: Not on file     Inability: Not on file    Transportation needs:     Medical: Not on file     Non-medical: Not on file   Tobacco Use    Smoking status: Former Smoker    Smokeless tobacco: Never Used   Substance and Sexual Activity    Alcohol use: Yes     Comment: social     Drug use: No    Sexual activity: Not on file   Lifestyle    Physical activity:     Days per week: Not on file     Minutes per session: Not on file    Stress: Not on file   Relationships    Social connections:     Talks on phone: Not on file     Gets together: Not on file     Attends Oriental orthodox service: Not on file     Active member of club or organization: Not on file     Attends meetings of clubs or organizations: Not on file     Relationship status: Not on file    Intimate partner violence:     Fear of current or ex partner: Not on file     Emotionally abused: Not on file     Physically abused: Not on file     Forced sexual activity: Not on file   Other Topics Concern    Not on file   Social History Narrative    Caffeine use        Review of Systems   Constitution: Negative for chills, decreased appetite, diaphoresis, fever, malaise/fatigue, night sweats, weight gain and weight loss  HENT: Negative for ear pain, hearing loss, hoarse voice, nosebleeds, sore throat and tinnitus  Eyes: Negative for blurred vision and pain  Cardiovascular: Positive for chest pain and dyspnea on exertion  Negative for claudication, cyanosis, irregular heartbeat, leg swelling, near-syncope, orthopnea, palpitations, paroxysmal nocturnal dyspnea and syncope  Respiratory: Positive for cough  Negative for hemoptysis, shortness of breath, sleep disturbances due to breathing, snoring, sputum production and wheezing  Hematologic/Lymphatic: Negative for adenopathy and bleeding problem   Does not bruise/bleed easily  Skin: Positive for rash  Negative for color change, dry skin, flushing, itching and poor wound healing  Musculoskeletal: Positive for arthritis, back pain and joint pain  Negative for falls, muscle cramps, muscle weakness, myalgias and neck pain  Gastrointestinal: Negative for abdominal pain, constipation, diarrhea, dysphagia, heartburn, hematemesis, hematochezia, melena, nausea and vomiting  Genitourinary: Negative for dysuria, frequency, hematuria, hesitancy, non-menstrual bleeding and urgency  Neurological: Positive for headaches and numbness  Negative for excessive daytime sleepiness, dizziness, focal weakness, light-headedness, loss of balance, paresthesias, tremors, vertigo and weakness  Psychiatric/Behavioral: Negative for altered mental status, depression and memory loss  The patient does not have insomnia and is not nervous/anxious  Allergic/Immunologic: Negative for environmental allergies and persistent infections  Vitals: /60 (BP Location: Left arm, Patient Position: Sitting, Cuff Size: Adult)   Pulse 58   Ht 5' 6" (1 676 m)   Wt 87 kg (191 lb 12 8 oz)   SpO2 96%   BMI 30 96 kg/m²     Physical Exam:     GEN: Alert and oriented x 3, in no acute distress  Well appearing and well nourished  HEENT: Sclera anicteric, conjunctivae pink, mucous membranes moist  Oropharynx clear  NECK: Supple, no carotid bruits, no significant JVD  Trachea midline, no thyromegaly  HEART: Bradycardic, regular rhythm, normal S1 and S2, no murmurs, clicks, gallops or rubs  PMI nondisplaced, no thrills  LUNGS: Clear to auscultation bilaterally; no wheezes, rales, or rhonchi  No increased work of breathing or signs of respiratory distress  ABDOMEN: Soft, nontender, nondistended, normoactive bowel sounds  EXTREMITIES: Skin warm and well perfused, no clubbing, cyanosis, or edema  NEURO: No focal findings  Normal gait  Normal speech   Mood and affect normal    SKIN: Normal without suspicious lesions on exposed skin        Lab Results:       Lab Results   Component Value Date    HGBA1C 5 5 10/12/2017    HGBA1C 5 8 (H) 02/27/2017    HGBA1C 5 6 04/08/2016     Lab Results   Component Value Date    CHOL 133 10/12/2017    CHOL 155 02/27/2017     Lab Results   Component Value Date    HDL 15 (L) 07/19/2019    HDL 6 (L) 11/26/2018    HDL 12 (L) 10/12/2017     Lab Results   Component Value Date    LDLCALC 137 (H) 08/10/2017     Lab Results   Component Value Date    TRIG 189 (H) 07/19/2019    TRIG 267 (H) 11/26/2018    TRIG 226 (H) 10/12/2017     No results found for: CHOLHDL

## 2019-07-20 LAB
ALBUMIN SERPL-MCNC: 4.3 G/DL (ref 3.6–5.1)
ALBUMIN/GLOB SERPL: 2.2 (CALC) (ref 1–2.5)
ALP SERPL-CCNC: 51 U/L (ref 40–115)
ALT SERPL-CCNC: 17 U/L (ref 9–46)
AST SERPL-CCNC: 20 U/L (ref 10–35)
BILIRUB SERPL-MCNC: 0.5 MG/DL (ref 0.2–1.2)
BUN SERPL-MCNC: 16 MG/DL (ref 7–25)
BUN/CREAT SERPL: ABNORMAL (CALC) (ref 6–22)
CALCIUM SERPL-MCNC: 9 MG/DL (ref 8.6–10.3)
CHLORIDE SERPL-SCNC: 105 MMOL/L (ref 98–110)
CHOLEST SERPL-MCNC: 98 MG/DL
CHOLEST/HDLC SERPL: 6.5 (CALC)
CO2 SERPL-SCNC: 25 MMOL/L (ref 20–32)
CREAT SERPL-MCNC: 0.81 MG/DL (ref 0.7–1.33)
GLOBULIN SER CALC-MCNC: 2 G/DL (CALC) (ref 1.9–3.7)
GLUCOSE SERPL-MCNC: 125 MG/DL (ref 65–99)
HDLC SERPL-MCNC: 15 MG/DL
LDLC SERPL CALC-MCNC: 57 MG/DL (CALC)
NONHDLC SERPL-MCNC: 83 MG/DL (CALC)
POTASSIUM SERPL-SCNC: 4.5 MMOL/L (ref 3.5–5.3)
PROT SERPL-MCNC: 6.3 G/DL (ref 6.1–8.1)
SL AMB EGFR AFRICAN AMERICAN: 114 ML/MIN/1.73M2
SL AMB EGFR NON AFRICAN AMERICAN: 98 ML/MIN/1.73M2
SODIUM SERPL-SCNC: 139 MMOL/L (ref 135–146)
TRIGL SERPL-MCNC: 189 MG/DL

## 2019-07-22 ENCOUNTER — OFFICE VISIT (OUTPATIENT)
Dept: CARDIOLOGY CLINIC | Facility: MEDICAL CENTER | Age: 59
End: 2019-07-22
Payer: COMMERCIAL

## 2019-07-22 VITALS
OXYGEN SATURATION: 96 % | HEART RATE: 58 BPM | BODY MASS INDEX: 30.82 KG/M2 | WEIGHT: 191.8 LBS | SYSTOLIC BLOOD PRESSURE: 114 MMHG | DIASTOLIC BLOOD PRESSURE: 60 MMHG | HEIGHT: 66 IN

## 2019-07-22 DIAGNOSIS — Z95.1 HX OF CABG: ICD-10-CM

## 2019-07-22 DIAGNOSIS — E78.2 MIXED HYPERLIPIDEMIA: ICD-10-CM

## 2019-07-22 DIAGNOSIS — I25.10 CORONARY ARTERY DISEASE INVOLVING NATIVE CORONARY ARTERY OF NATIVE HEART WITHOUT ANGINA PECTORIS: ICD-10-CM

## 2019-07-22 DIAGNOSIS — G47.33 OBSTRUCTIVE SLEEP APNEA: Primary | ICD-10-CM

## 2019-07-22 PROCEDURE — 99214 OFFICE O/P EST MOD 30 MIN: CPT | Performed by: INTERNAL MEDICINE

## 2019-07-22 RX ORDER — CLOPIDOGREL BISULFATE 75 MG/1
75 TABLET ORAL DAILY
Qty: 90 TABLET | Refills: 3 | Status: SHIPPED | OUTPATIENT
Start: 2019-07-22 | End: 2020-07-24 | Stop reason: SDUPTHER

## 2019-07-22 RX ORDER — RANOLAZINE 500 MG/1
500 TABLET, EXTENDED RELEASE ORAL 2 TIMES DAILY
Qty: 180 TABLET | Refills: 3 | Status: SHIPPED | OUTPATIENT
Start: 2019-07-22 | End: 2019-08-08

## 2019-07-23 ENCOUNTER — TELEPHONE (OUTPATIENT)
Dept: CARDIOLOGY CLINIC | Facility: CLINIC | Age: 59
End: 2019-07-23

## 2019-07-23 NOTE — TELEPHONE ENCOUNTER
Patient called regarding Ranexa script  States he can not afford to take this medication  It will cost the patient $327 00 to  a 30 day supply  I spoke to [facility], the high price is due to patient's deductible  Nothing can be done to lower that price until he meets his deductible  Patient was notified and does not want this medication, he says he is ok with not being given a replacement med but I told him I would ask you if there is something else the patient can try?

## 2019-08-08 ENCOUNTER — OFFICE VISIT (OUTPATIENT)
Dept: FAMILY MEDICINE CLINIC | Facility: CLINIC | Age: 59
End: 2019-08-08
Payer: COMMERCIAL

## 2019-08-08 VITALS
OXYGEN SATURATION: 96 % | TEMPERATURE: 98.5 F | DIASTOLIC BLOOD PRESSURE: 78 MMHG | WEIGHT: 192.4 LBS | SYSTOLIC BLOOD PRESSURE: 110 MMHG | BODY MASS INDEX: 30.92 KG/M2 | HEIGHT: 66 IN | RESPIRATION RATE: 16 BRPM | HEART RATE: 63 BPM

## 2019-08-08 DIAGNOSIS — I25.10 CORONARY ARTERY DISEASE INVOLVING NATIVE CORONARY ARTERY OF NATIVE HEART WITHOUT ANGINA PECTORIS: Primary | ICD-10-CM

## 2019-08-08 DIAGNOSIS — F33.0 DEPRESSION, MAJOR, RECURRENT, MILD (HCC): ICD-10-CM

## 2019-08-08 DIAGNOSIS — Z23 NEED FOR VACCINATION AGAINST STREPTOCOCCUS PNEUMONIAE USING PNEUMOCOCCAL CONJUGATE VACCINE 13: ICD-10-CM

## 2019-08-08 DIAGNOSIS — R73.01 ELEVATED FASTING BLOOD SUGAR: ICD-10-CM

## 2019-08-08 DIAGNOSIS — F41.9 ANXIETY: ICD-10-CM

## 2019-08-08 DIAGNOSIS — E78.2 MIXED HYPERLIPIDEMIA: ICD-10-CM

## 2019-08-08 LAB — SL AMB POCT HEMOGLOBIN AIC: 6.2 (ref ?–6.5)

## 2019-08-08 PROCEDURE — 83036 HEMOGLOBIN GLYCOSYLATED A1C: CPT | Performed by: PHYSICIAN ASSISTANT

## 2019-08-08 PROCEDURE — 1036F TOBACCO NON-USER: CPT | Performed by: PHYSICIAN ASSISTANT

## 2019-08-08 PROCEDURE — 90471 IMMUNIZATION ADMIN: CPT | Performed by: PHYSICIAN ASSISTANT

## 2019-08-08 PROCEDURE — 3044F HG A1C LEVEL LT 7.0%: CPT | Performed by: PHYSICIAN ASSISTANT

## 2019-08-08 PROCEDURE — 90670 PCV13 VACCINE IM: CPT | Performed by: PHYSICIAN ASSISTANT

## 2019-08-08 PROCEDURE — 99214 OFFICE O/P EST MOD 30 MIN: CPT | Performed by: PHYSICIAN ASSISTANT

## 2019-08-08 RX ORDER — ESCITALOPRAM OXALATE 10 MG/1
10 TABLET ORAL DAILY
Qty: 90 TABLET | Refills: 1 | Status: SHIPPED | OUTPATIENT
Start: 2019-08-08 | End: 2019-11-08 | Stop reason: SDUPTHER

## 2019-08-08 RX ORDER — MULTIVITAMIN WITH IRON
1 TABLET ORAL DAILY
COMMUNITY

## 2019-08-08 NOTE — PROGRESS NOTES
Assessment/Plan:     Diagnoses and all orders for this visit:    Coronary artery disease involving native coronary artery of native heart without angina pectoris  Comments:  Patient has some mild angina from time to time  Patient is being followed closely by Cardiology  Continue current regimen  Mixed hyperlipidemia  Comments:  Lipids are improved  Patient continues to have low HDL  Continue fenofibrate and statin therapy    Depression, major, recurrent, mild (HCC)  Comments:  Restart escitalopram 10 mg  Orders:  -     escitalopram (LEXAPRO) 10 mg tablet; Take 1 tablet (10 mg total) by mouth daily    Anxiety  Comments:  Restart escitalopram 10 mg  Continue trazodone 50 mg at night for sleep  Orders:  -     escitalopram (LEXAPRO) 10 mg tablet; Take 1 tablet (10 mg total) by mouth daily    Elevated fasting blood sugar  Comments:  Hemoglobin A1c is 6 2  Patient is prediabetic  Discussed low carb low sugar diet  Orders:  -     POCT hemoglobin A1c    Need for vaccination against Streptococcus pneumoniae using pneumococcal conjugate vaccine 13  -     PNEUMOCOCCAL CONJUGATE VACCINE 13-VALENT GREATER THAN 6 MONTHS    Other orders  -     B Complex-C (B-COMPLEX WITH VITAMIN C) tablet; Take 1 tablet by mouth daily          Subjective:      Patient ID: Gallo Bucio is a 63 y o  male  Patient presents for follow up chronic conditions  Patient has a history of coronary artery disease status post CABG  Patient is currently on aspirin, Plavix and Imdur 30 mg  Patient recently saw cardiologist   He was tried on Ranexa but was unable to afford this medication  For blood pressure control he is on metoprolol  mg daily  For lipid control he is on fenofibrate 145 and Pravachol 40  Patient has low HDL  History of GERD on over-the-counter meds only as needed  History of insomnia controlled with trazodone 50 mg at night  History of depression on and off  Patient would like to restart generic Lexapro    Labs reviewed show fasting blood sugar at 125  We will obtain A1c in the office  Otherwise hepatic and renal functions are normal   His cholesterol is down to 90 his good cholesterol HDL is up to 15  Triglycerides are down to 189 and his LDL is 57  Hemoglobin A1c done in office is 6 2  Discussed pre diabetes  Discussed low carb low sugar diet  Patient seems to be following the diet okay  However he has been eating a lot of candy        The following portions of the patient's history were reviewed and updated as appropriate:   He   Patient Active Problem List    Diagnosis Date Noted    Lumbar radiculopathy, right 05/09/2019    Acute pain of right knee 05/09/2019    Acute pain of right shoulder 05/09/2019    Screening for prostate cancer 09/24/2018    Screen for colon cancer 09/24/2018    Hx of CABG 03/09/2018    Low HDL (under 40) 11/17/2017    Depression, major, recurrent, mild ([facility] [address] ) 08/21/2017    Obesity 03/21/2016    Obstructive sleep apnea 04/14/2014    Mixed hyperlipidemia 08/22/2013    Anxiety 03/01/2012    Coronary artery disease involving native heart without angina pectoris 03/01/2012    Esophageal reflux 03/01/2012    Insomnia 03/01/2012    Restless legs syndrome 03/01/2012     Current Outpatient Medications   Medication Sig Dispense Refill    Ascorbic Acid (VITAMIN C PO) Take by mouth      aspirin 81 mg chewable tablet Chew 81 mg daily      B Complex-C (B-COMPLEX WITH VITAMIN C) tablet Take 1 tablet by mouth daily      clopidogrel (PLAVIX) 75 mg tablet Take 1 tablet (75 mg total) by mouth daily 90 tablet 3    Coenzyme Q10 200 MG capsule Take 200 mg by mouth daily      fenofibrate (TRICOR) 145 mg tablet Take 1 tablet (145 mg total) by mouth daily 90 tablet 3    isosorbide mononitrate (IMDUR) 30 mg 24 hr tablet Take 1 tablet (30 mg total) by mouth daily 90 tablet 3    metoprolol succinate (TOPROL-XL) 100 mg 24 hr tablet Take 1 tablet (100 mg total) by mouth daily 90 tablet 3    nitroglycerin (NITROSTAT) 0 4 mg SL tablet Place 0 4 mg under the tongue every 5 (five) minutes as needed        Omega-3 Fatty Acids (FISH OIL) 645 MG CAPS Take by mouth Daily      pravastatin (PRAVACHOL) 40 mg tablet Take 1 tablet (40 mg total) by mouth daily at bedtime 90 tablet 3    traZODone (DESYREL) 50 mg tablet Take 1/2 tablet at bedtime  Increase to 1 tablet as needed  90 tablet 0    Cyanocobalamin (VITAMIN B 12 PO) Take by mouth      escitalopram (LEXAPRO) 10 mg tablet Take 1 tablet (10 mg total) by mouth daily 90 tablet 1     No current facility-administered medications for this visit  Current Outpatient Medications on File Prior to Visit   Medication Sig    Ascorbic Acid (VITAMIN C PO) Take by mouth    aspirin 81 mg chewable tablet Chew 81 mg daily    B Complex-C (B-COMPLEX WITH VITAMIN C) tablet Take 1 tablet by mouth daily    clopidogrel (PLAVIX) 75 mg tablet Take 1 tablet (75 mg total) by mouth daily    Coenzyme Q10 200 MG capsule Take 200 mg by mouth daily    fenofibrate (TRICOR) 145 mg tablet Take 1 tablet (145 mg total) by mouth daily    isosorbide mononitrate (IMDUR) 30 mg 24 hr tablet Take 1 tablet (30 mg total) by mouth daily    metoprolol succinate (TOPROL-XL) 100 mg 24 hr tablet Take 1 tablet (100 mg total) by mouth daily    nitroglycerin (NITROSTAT) 0 4 mg SL tablet Place 0 4 mg under the tongue every 5 (five) minutes as needed      Omega-3 Fatty Acids (FISH OIL) 645 MG CAPS Take by mouth Daily    pravastatin (PRAVACHOL) 40 mg tablet Take 1 tablet (40 mg total) by mouth daily at bedtime    traZODone (DESYREL) 50 mg tablet Take 1/2 tablet at bedtime  Increase to 1 tablet as needed   Cyanocobalamin (VITAMIN B 12 PO) Take by mouth    [DISCONTINUED] ranolazine (RANEXA) 500 mg 12 hr tablet Take 1 tablet (500 mg total) by mouth 2 (two) times a day (Patient not taking: Reported on 8/8/2019)     No current facility-administered medications on file prior to visit        He is allergic to iodinated diagnostic agents; meloxicam; and omnipaque [iohexol]       Review of Systems   Constitutional: Negative for activity change, appetite change, chills, fatigue and fever  HENT: Negative for ear pain and sore throat  Eyes: Negative for visual disturbance  Respiratory: Negative for cough and shortness of breath  Cardiovascular: Negative for chest pain, palpitations and leg swelling  Gastrointestinal: Negative for abdominal pain, blood in stool, constipation, diarrhea and nausea  Genitourinary: Negative for difficulty urinating  Musculoskeletal: Positive for back pain  Negative for arthralgias and myalgias  Skin: Negative for rash  Neurological: Negative for dizziness, syncope and headaches  Psychiatric/Behavioral: Negative for sleep disturbance and suicidal ideas  The patient is not nervous/anxious  Objective:        Physical Exam   Constitutional: He is oriented to person, place, and time  He appears well-developed and well-nourished  HENT:   Head: Normocephalic and atraumatic  Right Ear: Tympanic membrane, external ear and ear canal normal    Left Ear: Tympanic membrane, external ear and ear canal normal    Mouth/Throat: Oropharynx is clear and moist    Eyes: Pupils are equal, round, and reactive to light  Conjunctivae are normal    Neck: Neck supple  Carotid bruit is not present  No thyromegaly present  Cardiovascular: Normal rate, regular rhythm and normal heart sounds  No murmur heard  Pulmonary/Chest: Effort normal and breath sounds normal  He has no wheezes  Abdominal: Soft  Bowel sounds are normal  He exhibits no mass  There is no tenderness  Musculoskeletal: He exhibits no edema  Lymphadenopathy:     He has no cervical adenopathy  Neurological: He is alert and oriented to person, place, and time  Skin: Skin is warm and dry  No rash noted  No erythema  Psychiatric: He has a normal mood and affect   His behavior is normal  Judgment and thought content normal    Nursing note and vitals reviewed

## 2019-10-22 ENCOUNTER — OFFICE VISIT (OUTPATIENT)
Dept: URGENT CARE | Facility: CLINIC | Age: 59
End: 2019-10-22
Payer: COMMERCIAL

## 2019-10-22 VITALS
HEIGHT: 66 IN | WEIGHT: 185 LBS | TEMPERATURE: 97.6 F | HEART RATE: 58 BPM | RESPIRATION RATE: 18 BRPM | SYSTOLIC BLOOD PRESSURE: 120 MMHG | BODY MASS INDEX: 29.73 KG/M2 | DIASTOLIC BLOOD PRESSURE: 78 MMHG | OXYGEN SATURATION: 95 %

## 2019-10-22 DIAGNOSIS — L02.416 CUTANEOUS ABSCESS OF LEFT LOWER EXTREMITY: Primary | ICD-10-CM

## 2019-10-22 PROCEDURE — 99213 OFFICE O/P EST LOW 20 MIN: CPT | Performed by: PHYSICIAN ASSISTANT

## 2019-10-22 RX ORDER — SULFAMETHOXAZOLE AND TRIMETHOPRIM 800; 160 MG/1; MG/1
1 TABLET ORAL EVERY 12 HOURS SCHEDULED
Qty: 14 TABLET | Refills: 0 | Status: SHIPPED | OUTPATIENT
Start: 2019-10-22 | End: 2019-10-29

## 2019-10-22 RX ORDER — ACETAMINOPHEN AND CODEINE PHOSPHATE 300; 30 MG/1; MG/1
1 TABLET ORAL EVERY 6 HOURS PRN
Qty: 12 TABLET | Refills: 0 | Status: SHIPPED | OUTPATIENT
Start: 2019-10-22 | End: 2020-02-07 | Stop reason: ALTCHOICE

## 2019-10-22 NOTE — PROGRESS NOTES
[facility]        NAME: Caitlyn Guerra is a 63 y o  male  : 1960    MRN: [id number]  DATE: 2019  TIME: 12:08 PM    Assessment and Plan   Cutaneous abscess of left lower extremity [L02 416]  1  Cutaneous abscess of left lower extremity  sulfamethoxazole-trimethoprim (BACTRIM DS) 800-160 mg per tablet    Ambulatory referral to General Surgery    acetaminophen-codeine (TYLENOL #3) 300-30 mg per tablet         Patient Instructions     Patient Instructions     Warm compresses  Do not squeeze  Short course pain control for sleep  Start on antibiotics  Follow up with General surgery  Follow up with PCP in 3-5 days  Proceed to  ER if symptoms worsen  Chief Complaint     Chief Complaint   Patient presents with    Skin Problem     L inner thigh since April  Sun flared back up  History of Present Illness         78-year-old male presents to the clinic with 3 days of increased pain and swelling in the left groin  He has a history of abscess there  He says in April he was seen at another urgent care and had the abscess drained  He was on some antibiotics  It never went away  Last 2 days it has gotten more painful  He has pain when he walks and pain when he lays down to sleep  No fever  No drainage now  Review of Systems   Review of Systems   Constitutional: Negative for fever  HENT: Negative  Respiratory: Negative  Cardiovascular: Negative  Gastrointestinal: Negative  Skin: Positive for wound  Negative for rash           Current Medications       Current Outpatient Medications:     Ascorbic Acid (VITAMIN C PO), Take by mouth, Disp: , Rfl:     aspirin 81 mg chewable tablet, Chew 81 mg daily, Disp: , Rfl:     B Complex-C (B-COMPLEX WITH VITAMIN C) tablet, Take 1 tablet by mouth daily, Disp: , Rfl:     clopidogrel (PLAVIX) 75 mg tablet, Take 1 tablet (75 mg total) by mouth daily, Disp: 90 tablet, Rfl: 3    Coenzyme Q10 200 MG capsule, Take 200 mg by mouth daily, Disp: , Rfl:     escitalopram (LEXAPRO) 10 mg tablet, Take 1 tablet (10 mg total) by mouth daily, Disp: 90 tablet, Rfl: 1    fenofibrate (TRICOR) 145 mg tablet, Take 1 tablet (145 mg total) by mouth daily, Disp: 90 tablet, Rfl: 3    isosorbide mononitrate (IMDUR) 30 mg 24 hr tablet, Take 1 tablet (30 mg total) by mouth daily, Disp: 90 tablet, Rfl: 3    metoprolol succinate (TOPROL-XL) 100 mg 24 hr tablet, Take 1 tablet (100 mg total) by mouth daily, Disp: 90 tablet, Rfl: 3    nitroglycerin (NITROSTAT) 0 4 mg SL tablet, Place 0 4 mg under the tongue every 5 (five) minutes as needed  , Disp: , Rfl:     Omega-3 Fatty Acids (FISH OIL) 645 MG CAPS, Take by mouth Daily, Disp: , Rfl:     pravastatin (PRAVACHOL) 40 mg tablet, Take 1 tablet (40 mg total) by mouth daily at bedtime, Disp: 90 tablet, Rfl: 3    traZODone (DESYREL) 50 mg tablet, Take 1/2 tablet at bedtime  Increase to 1 tablet as needed  , Disp: 90 tablet, Rfl: 0    acetaminophen-codeine (TYLENOL #3) 300-30 mg per tablet, Take 1 tablet by mouth every 6 (six) hours as needed for moderate pain, Disp: 12 tablet, Rfl: 0    sulfamethoxazole-trimethoprim (BACTRIM DS) 800-160 mg per tablet, Take 1 tablet by mouth every 12 (twelve) hours for 7 days, Disp: 14 tablet, Rfl: 0    Current Allergies     Allergies as of 10/22/2019 - Reviewed 10/22/2019   Allergen Reaction Noted    Iodinated diagnostic agents  08/09/2017    Meloxicam  08/10/2017    Omnipaque [iohexol]  02/18/2018            The following portions of the patient's history were reviewed and updated as appropriate: allergies, current medications, past family history, past medical history, past social history, past surgical history and problem list      Past Medical History:   Diagnosis Date    Anxiety     Coronary artery disease     Depression     Hyperlipidemia     Nephrolithiasis     Rotator cuff tear     Sleep apnea     Subdural hemorrhage-concussion [facility])        Past Surgical History:   Procedure Laterality Date    BRAIN SURGERY      CARDIAC CATHETERIZATION      CORONARY ARTERY BYPASS GRAFT      HERNIA REPAIR      INGUINAL HERNIA REPAIR      OTHER SURGICAL HISTORY      cardiac cath procedure outcome - successful     SHOULDER SURGERY      arthroscopy     TONSILLECTOMY      VASECTOMY         Family History   Problem Relation Age of Onset    Diabetes Mother         mellitus     Depression Mother     Stroke Father     Other Father         other lymphatic and hematopoietic neoplasms     Hypertension Family     Cancer Family     Arthritis Family          Medications have been verified  Objective   /78 (BP Location: Left arm, Patient Position: Sitting, Cuff Size: Standard)   Pulse 58   Temp 97 6 °F (36 4 °C) (Temporal)   Resp 18   Ht 5' 6" (1 676 m)   Wt 83 9 kg (185 lb)   SpO2 95%   BMI 29 86 kg/m²        Physical Exam     Physical Exam   Constitutional: He is oriented to person, place, and time  He appears well-developed and well-nourished  No distress  Pulmonary/Chest: Effort normal    Neurological: He is alert and oriented to person, place, and time  Skin: Skin is warm and dry  Left medial thigh distal to the inguinal fold there is a 1 cm mobile fluctuant erythematous and tender abscess  No pustule or drainage  Skin is intact overlying

## 2019-10-22 NOTE — PATIENT INSTRUCTIONS
Warm compresses  Do not squeeze  Short course pain control for sleep  Start on antibiotics  Follow up with General surgery

## 2019-10-28 ENCOUNTER — OFFICE VISIT (OUTPATIENT)
Dept: SURGERY | Facility: CLINIC | Age: 59
End: 2019-10-28
Payer: COMMERCIAL

## 2019-10-28 VITALS
BODY MASS INDEX: 29.41 KG/M2 | DIASTOLIC BLOOD PRESSURE: 80 MMHG | HEIGHT: 66 IN | WEIGHT: 183 LBS | HEART RATE: 51 BPM | SYSTOLIC BLOOD PRESSURE: 120 MMHG | RESPIRATION RATE: 15 BRPM | TEMPERATURE: 97.9 F

## 2019-10-28 DIAGNOSIS — L02.416 ABSCESS OF LEFT THIGH: Primary | ICD-10-CM

## 2019-10-28 PROCEDURE — 99202 OFFICE O/P NEW SF 15 MIN: CPT | Performed by: SURGERY

## 2019-10-28 PROCEDURE — 10060 I&D ABSCESS SIMPLE/SINGLE: CPT | Performed by: SURGERY

## 2019-10-28 NOTE — PROGRESS NOTES
Assessment/Plan:    Incision and drain  Date/Time: 10/28/2019 8:45 AM  Performed by: Varsha Garland MD  Authorized by: Varsha Garland MD     Patient location:  Clinic  Other Assisting Provider: No    Consent:     Consent obtained:  Verbal    Consent given by:  Patient    Risks discussed:  Bleeding and incomplete drainage    Alternatives discussed:  Observation  Universal protocol:     Procedure explained and questions answered to patient or proxy's satisfaction: yes    Location:     Type:  Abscess    Location:  Lower extremity    Lower extremity location:  L leg  Pre-procedure details:     Skin preparation:  Chloraprep  Anesthesia (see MAR for exact dosages): Anesthesia method:  Local infiltration    Local anesthetic:  Lidocaine 1% w/o epi  Procedure details:     Complexity:  Simple    Needle aspiration: no      Incision types:  Stab incision    Scalpel blade:  11    Approach:  Open    Incision depth:  Subcutaneous    Wound management:  Probed and deloculated and irrigated with saline    Drainage:  Purulent    Drainage amount:  Scant    Wound treatment:  Wound left open    Packing materials:  None  Post-procedure details:     Patient tolerance of procedure: Tolerated well, no immediate complications    He tolerated the I&D  Instructed to remove the dressing and shower and get the wound soapy  Pat dry and replace a bandage daily until drainage stops  F/u prn  Subjective:      Patient ID: Marianne Flores is a 63 y o  male  Triage Notes: Pt is here today for a consult on an abscess on his left thigh  It was the size of a tennis ball but has now decreased in size  It was draining blood  Mr Gricel Astudillo is presenting with a thigh abscess (left)  He has had it for several weeks and it has been drained  It was at its largest the size of a tennis ball but now is relatively small and flat  There is minimal warmth/redness  No fevers or chills             Review of Systems   Constitutional: Negative for appetite change, chills, fever and unexpected weight change  HENT: Negative for hearing loss, sore throat, trouble swallowing and voice change  Eyes: Negative for visual disturbance  Respiratory: Negative for cough, chest tightness, shortness of breath and wheezing  Cardiovascular: Negative for chest pain and palpitations  Gastrointestinal: Negative for abdominal distention and blood in stool  Endocrine: Negative for cold intolerance and heat intolerance  Genitourinary: Negative for difficulty urinating  Skin: Negative for color change and rash  Neurological: Negative for dizziness, speech difficulty, light-headedness and numbness  Hematological: Does not bruise/bleed easily  Psychiatric/Behavioral: Negative for confusion, hallucinations and suicidal ideas  Objective: There were no vitals taken for this visit  Physical Exam   Constitutional: He is oriented to person, place, and time  He appears well-developed and well-nourished  No distress  HENT:   Head: Normocephalic and atraumatic  Eyes: EOM are normal  Right eye exhibits no discharge  Left eye exhibits no discharge  Neck: Normal range of motion  Neck supple  No JVD present  Cardiovascular: Normal rate and regular rhythm  Pulmonary/Chest: Effort normal and breath sounds normal    Abdominal: Soft  He exhibits no distension  There is no tenderness  There is no guarding  Musculoskeletal: Normal range of motion  Neurological: He is alert and oriented to person, place, and time  Skin: Skin is warm and dry  On the left medial thigh there is a 1 5 x 1 cm area with slight fluctuance and rubor  Minimal warmth  No opening or active drainage  Psychiatric: He has a normal mood and affect  His behavior is normal  Thought content normal    Nursing note and vitals reviewed

## 2019-11-06 PROBLEM — R73.03 PREDIABETES: Status: ACTIVE | Noted: 2019-11-06

## 2019-11-08 ENCOUNTER — TELEPHONE (OUTPATIENT)
Dept: FAMILY MEDICINE CLINIC | Facility: CLINIC | Age: 59
End: 2019-11-08

## 2019-11-08 ENCOUNTER — OFFICE VISIT (OUTPATIENT)
Dept: FAMILY MEDICINE CLINIC | Facility: CLINIC | Age: 59
End: 2019-11-08
Payer: COMMERCIAL

## 2019-11-08 VITALS
RESPIRATION RATE: 16 BRPM | BODY MASS INDEX: 29.73 KG/M2 | HEIGHT: 66 IN | DIASTOLIC BLOOD PRESSURE: 78 MMHG | WEIGHT: 185 LBS | TEMPERATURE: 98.4 F | HEART RATE: 50 BPM | OXYGEN SATURATION: 97 % | SYSTOLIC BLOOD PRESSURE: 122 MMHG

## 2019-11-08 DIAGNOSIS — F33.0 DEPRESSION, MAJOR, RECURRENT, MILD (HCC): ICD-10-CM

## 2019-11-08 DIAGNOSIS — E78.2 MIXED HYPERLIPIDEMIA: ICD-10-CM

## 2019-11-08 DIAGNOSIS — Z23 ENCOUNTER FOR IMMUNIZATION: ICD-10-CM

## 2019-11-08 DIAGNOSIS — F41.9 ANXIETY: ICD-10-CM

## 2019-11-08 DIAGNOSIS — G47.00 INSOMNIA, UNSPECIFIED TYPE: ICD-10-CM

## 2019-11-08 DIAGNOSIS — Z12.5 SCREENING FOR PROSTATE CANCER: ICD-10-CM

## 2019-11-08 DIAGNOSIS — I25.10 CORONARY ARTERY DISEASE INVOLVING NATIVE CORONARY ARTERY OF NATIVE HEART WITHOUT ANGINA PECTORIS: Primary | ICD-10-CM

## 2019-11-08 DIAGNOSIS — R73.03 PREDIABETES: ICD-10-CM

## 2019-11-08 PROBLEM — M25.511 ACUTE PAIN OF RIGHT SHOULDER: Status: RESOLVED | Noted: 2019-05-09 | Resolved: 2019-11-08

## 2019-11-08 PROBLEM — M25.561 ACUTE PAIN OF RIGHT KNEE: Status: RESOLVED | Noted: 2019-05-09 | Resolved: 2019-11-08

## 2019-11-08 PROBLEM — M54.16 LUMBAR RADICULOPATHY, RIGHT: Status: RESOLVED | Noted: 2019-05-09 | Resolved: 2019-11-08

## 2019-11-08 LAB — SL AMB POCT HEMOGLOBIN AIC: 5.1 (ref ?–6.5)

## 2019-11-08 PROCEDURE — 1036F TOBACCO NON-USER: CPT | Performed by: PHYSICIAN ASSISTANT

## 2019-11-08 PROCEDURE — 90682 RIV4 VACC RECOMBINANT DNA IM: CPT | Performed by: PHYSICIAN ASSISTANT

## 2019-11-08 PROCEDURE — 99214 OFFICE O/P EST MOD 30 MIN: CPT | Performed by: PHYSICIAN ASSISTANT

## 2019-11-08 PROCEDURE — 3044F HG A1C LEVEL LT 7.0%: CPT | Performed by: PHYSICIAN ASSISTANT

## 2019-11-08 PROCEDURE — 90471 IMMUNIZATION ADMIN: CPT | Performed by: PHYSICIAN ASSISTANT

## 2019-11-08 PROCEDURE — 83036 HEMOGLOBIN GLYCOSYLATED A1C: CPT | Performed by: PHYSICIAN ASSISTANT

## 2019-11-08 RX ORDER — ESCITALOPRAM OXALATE 10 MG/1
10 TABLET ORAL DAILY
Qty: 90 TABLET | Refills: 1 | Status: SHIPPED | OUTPATIENT
Start: 2019-11-08 | End: 2020-02-07 | Stop reason: SDUPTHER

## 2019-11-08 NOTE — TELEPHONE ENCOUNTER
Pt forgot to ask you what he should do to manage the site where the vein was removed from his leg  To prevent infection   He used the words "to prevent the cellulitis"

## 2019-11-08 NOTE — PROGRESS NOTES
Assessment/Plan:     Diagnoses and all orders for this visit:    Coronary artery disease involving native coronary artery of native heart without angina pectoris  Comments:  Coronary artery disease with known blockages  Patient is on maximum medical therapy  Will follow up with Cardiology  No congestive heart failure  Orders:  -     Comprehensive metabolic panel; Future    Mixed hyperlipidemia  Comments:  Continue diet, fenofibrate and statin therapy along with low-fat diet  Orders:  -     Lipid panel; Future    Depression, major, recurrent, mild (HCC)  Comments:  Depression in remission continue generic Lexapro 10 mg  Orders:  -     escitalopram (LEXAPRO) 10 mg tablet; Take 1 tablet (10 mg total) by mouth daily    Anxiety  -     escitalopram (LEXAPRO) 10 mg tablet; Take 1 tablet (10 mg total) by mouth daily    Insomnia, unspecified type  Comments:  Continue trazodone HS p r n  Prediabetes  Comments:  Hemoglobin A1c is 5 1  Elevated fasting blood sugar has resolved with dietary changes  Orders:  -     POCT hemoglobin A1c    Encounter for immunization  -     influenza vaccine, 0320-1921, quadrivalent, recombinant, PF, 0 5 mL, for patients 18 yr+ (FLUBLOK)    Depression, major, recurrent, mild (HCC)  Comments:  Restart escitalopram 10 mg  Orders:  -     escitalopram (LEXAPRO) 10 mg tablet; Take 1 tablet (10 mg total) by mouth daily    Anxiety  Comments:  Restart escitalopram 10 mg  Continue trazodone 50 mg at night for sleep  Orders:  -     escitalopram (LEXAPRO) 10 mg tablet; Take 1 tablet (10 mg total) by mouth daily    Screening for prostate cancer  -     PSA, Total Screen; Future          Subjective:      Patient ID: Vivian Boykin is a 63 y o  male  Patient presents for follow up chronic conditions  Patient has a history of coronary artery disease status post CABG  He is on aspirin 81 mg and Plavix  Patient has hyperlipidemia with low HDL  He is on Tricor 145 and pravastatin 40   He is on Imdur or 30 and metoprolol  mg a day  Patient has angina at times with exertion  No signs of congestive heart failure  Patient follows regularly with Cardiology  Patient has a history of anxiety depression and insomnia  Symptoms are currently controlled with Lexapro 10 mg  Patient takes trazodone 50 mg 1/2 at night for sleep  Patient has a history of elevated fasting blood sugar  He has been trying to eat a low carb low sugar diet  Patient's hemoglobin A1c in the office today is 5 1 flu vaccine updated today  Patient was seen in urgent care for boil in the left groin  He was started on p o  Antibiotics and referred to general surgeon  Sur[place]  Patient states it is not a boil but scar tissue from from vein harvest for his CABG          The following portions of the patient's history were reviewed and updated as appropriate:   He   Patient Active Problem List    Diagnosis Date Noted    Prediabetes 11/06/2019    Screening for prostate cancer 09/24/2018    Screen for colon cancer 09/24/2018    Hx of CABG 03/09/2018    Low HDL (under 40) 11/17/2017    [facility], [facility], [facility] 08[facility]21[facility]    Obesity 03/21/2016    Obstructive sleep apnea 04/14/2014    Mixed hyperlipidemia 08/22/2013    Anxiety 03/01/2012    Coronary artery disease involving native heart without angina pectoris 03/01/2012    Esophageal reflux 03/01/2012    Insomnia 03/01/2012    Restless legs syndrome 03/01/2012     Current Outpatient Medications   Medication Sig Dispense Refill    acetaminophen-codeine (TYLENOL #3) 300-30 mg per tablet Take 1 tablet by mouth every 6 (six) hours as needed for moderate pain 12 tablet 0    Ascorbic Acid (VITAMIN C PO) Take by mouth      aspirin 81 mg chewable tablet Chew 81 mg daily      B Complex-C (B-COMPLEX WITH VITAMIN C) tablet Take 1 tablet by mouth daily      clopidogrel (PLAVIX) 75 mg tablet Take 1 tablet (75 mg total) by mouth daily 90 tablet 3    Coenzyme Q10 200 MG capsule Take 200 mg by mouth daily      escitalopram (LEXAPRO) 10 mg tablet Take 1 tablet (10 mg total) by mouth daily 90 tablet 1    fenofibrate (TRICOR) 145 mg tablet Take 1 tablet (145 mg total) by mouth daily 90 tablet 3    isosorbide mononitrate (IMDUR) 30 mg 24 hr tablet Take 1 tablet (30 mg total) by mouth daily 90 tablet 3    metoprolol succinate (TOPROL-XL) 100 mg 24 hr tablet Take 1 tablet (100 mg total) by mouth daily 90 tablet 3    nitroglycerin (NITROSTAT) 0 4 mg SL tablet Place 0 4 mg under the tongue every 5 (five) minutes as needed        Omega-3 Fatty Acids (FISH OIL) 645 MG CAPS Take by mouth Daily      pravastatin (PRAVACHOL) 40 mg tablet Take 1 tablet (40 mg total) by mouth daily at bedtime 90 tablet 3    traZODone (DESYREL) 50 mg tablet Take 1/2 tablet at bedtime  Increase to 1 tablet as needed  90 tablet 0     No current facility-administered medications for this visit        Current Outpatient Medications on File Prior to Visit   Medication Sig    acetaminophen-codeine (TYLENOL #3) 300-30 mg per tablet Take 1 tablet by mouth every 6 (six) hours as needed for moderate pain    Ascorbic Acid (VITAMIN C PO) Take by mouth    aspirin 81 mg chewable tablet Chew 81 mg daily    B Complex-C (B-COMPLEX WITH VITAMIN C) tablet Take 1 tablet by mouth daily    clopidogrel (PLAVIX) 75 mg tablet Take 1 tablet (75 mg total) by mouth daily    Coenzyme Q10 200 MG capsule Take 200 mg by mouth daily    fenofibrate (TRICOR) 145 mg tablet Take 1 tablet (145 mg total) by mouth daily    isosorbide mononitrate (IMDUR) 30 mg 24 hr tablet Take 1 tablet (30 mg total) by mouth daily    metoprolol succinate (TOPROL-XL) 100 mg 24 hr tablet Take 1 tablet (100 mg total) by mouth daily    nitroglycerin (NITROSTAT) 0 4 mg SL tablet Place 0 4 mg under the tongue every 5 (five) minutes as needed      Omega-3 Fatty Acids (FISH OIL) 645 MG CAPS Take by mouth Daily    pravastatin (PRAVACHOL) 40 mg tablet Take 1 tablet (40 mg total) by mouth daily at bedtime    traZODone (DESYREL) 50 mg tablet Take 1/2 tablet at bedtime  Increase to 1 tablet as needed   [DISCONTINUED] escitalopram (LEXAPRO) 10 mg tablet Take 1 tablet (10 mg total) by mouth daily     No current facility-administered medications on file prior to visit  He is allergic to iodinated diagnostic agents; meloxicam; and omnipaque [iohexol]       Review of Systems   Constitutional: Negative for activity change, appetite change, chills, fatigue and fever  HENT: Negative for ear pain and sore throat  Eyes: Negative for visual disturbance  Respiratory: Positive for shortness of breath  Negative for cough  Cardiovascular: Positive for chest pain  Negative for palpitations and leg swelling  Gastrointestinal: Negative for abdominal pain, blood in stool, constipation, diarrhea and nausea  Genitourinary: Negative for difficulty urinating  Musculoskeletal: Negative for arthralgias, back pain and myalgias  Skin: Positive for wound  Negative for rash  Lesion   Left  groin   Neurological: Negative for dizziness, syncope and headaches  Psychiatric/Behavioral: Negative for sleep disturbance  Objective:        Physical Exam   Constitutional: He is oriented to person, place, and time  He appears well-developed and well-nourished  No distress  HENT:   Head: Normocephalic and atraumatic  Right Ear: Tympanic membrane, external ear and ear canal normal    Left Ear: Tympanic membrane, external ear and ear canal normal    Mouth/Throat: Oropharynx is clear and moist    Eyes: Pupils are equal, round, and reactive to light  Conjunctivae are normal    Neck: Neck supple  Carotid bruit is not present  No thyromegaly present  Cardiovascular: Normal rate, regular rhythm and normal heart sounds  No murmur heard  Pulmonary/Chest: Effort normal and breath sounds normal  He has no wheezes  Abdominal: Soft   Bowel sounds are normal  He exhibits no mass  There is no tenderness  Musculoskeletal: He exhibits no edema  Lymphadenopathy:     He has no cervical adenopathy  Neurological: He is alert and oriented to person, place, and time  Skin: Skin is warm and dry  No rash noted  He is not diaphoretic  No erythema  Resolving cellulitis left groin  Psychiatric: He has a normal mood and affect  His behavior is normal  Judgment and thought content normal    Nursing note and vitals reviewed

## 2019-11-08 NOTE — TELEPHONE ENCOUNTER
Call patient  I did go over what he needed to do  Loose fitting boxer underwear to prevent excess rubbing in the area  Also keep area clean and dry    Avoid excessive sweating

## 2020-01-01 DIAGNOSIS — I25.10 CORONARY ARTERY DISEASE INVOLVING NATIVE HEART WITHOUT ANGINA PECTORIS, UNSPECIFIED VESSEL OR LESION TYPE: ICD-10-CM

## 2020-01-01 DIAGNOSIS — E78.2 MIXED HYPERLIPIDEMIA: ICD-10-CM

## 2020-01-02 RX ORDER — METOPROLOL SUCCINATE 100 MG/1
TABLET, EXTENDED RELEASE ORAL
Qty: 90 TABLET | Refills: 3 | Status: SHIPPED | OUTPATIENT
Start: 2020-01-02 | End: 2020-12-23 | Stop reason: SDUPTHER

## 2020-01-02 RX ORDER — ISOSORBIDE MONONITRATE 30 MG/1
TABLET, EXTENDED RELEASE ORAL
Qty: 90 TABLET | Refills: 3 | Status: SHIPPED | OUTPATIENT
Start: 2020-01-02 | End: 2020-04-07 | Stop reason: ALTCHOICE

## 2020-01-02 RX ORDER — PRAVASTATIN SODIUM 40 MG
TABLET ORAL
Qty: 90 TABLET | Refills: 3 | Status: SHIPPED | OUTPATIENT
Start: 2020-01-02 | End: 2020-12-23 | Stop reason: SDUPTHER

## 2020-02-07 ENCOUNTER — APPOINTMENT (OUTPATIENT)
Dept: LAB | Facility: MEDICAL CENTER | Age: 60
End: 2020-02-07
Payer: COMMERCIAL

## 2020-02-07 ENCOUNTER — OFFICE VISIT (OUTPATIENT)
Dept: FAMILY MEDICINE CLINIC | Facility: CLINIC | Age: 60
End: 2020-02-07
Payer: COMMERCIAL

## 2020-02-07 VITALS
TEMPERATURE: 98 F | HEART RATE: 60 BPM | SYSTOLIC BLOOD PRESSURE: 130 MMHG | RESPIRATION RATE: 16 BRPM | OXYGEN SATURATION: 96 % | HEIGHT: 66 IN | DIASTOLIC BLOOD PRESSURE: 78 MMHG | BODY MASS INDEX: 30.7 KG/M2 | WEIGHT: 191 LBS

## 2020-02-07 DIAGNOSIS — I25.10 CORONARY ARTERY DISEASE INVOLVING NATIVE CORONARY ARTERY OF NATIVE HEART WITHOUT ANGINA PECTORIS: Primary | ICD-10-CM

## 2020-02-07 DIAGNOSIS — E78.2 MIXED HYPERLIPIDEMIA: ICD-10-CM

## 2020-02-07 DIAGNOSIS — Z12.5 SCREENING FOR PROSTATE CANCER: ICD-10-CM

## 2020-02-07 DIAGNOSIS — F41.9 ANXIETY: ICD-10-CM

## 2020-02-07 DIAGNOSIS — R39.11 BENIGN PROSTATIC HYPERPLASIA WITH URINARY HESITANCY: ICD-10-CM

## 2020-02-07 DIAGNOSIS — G47.00 INSOMNIA, UNSPECIFIED TYPE: ICD-10-CM

## 2020-02-07 DIAGNOSIS — F33.0 DEPRESSION, MAJOR, RECURRENT, MILD (HCC): ICD-10-CM

## 2020-02-07 DIAGNOSIS — N40.1 BENIGN PROSTATIC HYPERPLASIA WITH URINARY HESITANCY: ICD-10-CM

## 2020-02-07 DIAGNOSIS — I25.10 CORONARY ARTERY DISEASE INVOLVING NATIVE CORONARY ARTERY OF NATIVE HEART WITHOUT ANGINA PECTORIS: ICD-10-CM

## 2020-02-07 DIAGNOSIS — R73.03 PREDIABETES: ICD-10-CM

## 2020-02-07 LAB
ALBUMIN SERPL BCP-MCNC: 4.3 G/DL (ref 3.5–5)
ALP SERPL-CCNC: 53 U/L (ref 46–116)
ALT SERPL W P-5'-P-CCNC: 33 U/L (ref 12–78)
ANION GAP SERPL CALCULATED.3IONS-SCNC: 3 MMOL/L (ref 4–13)
AST SERPL W P-5'-P-CCNC: 24 U/L (ref 5–45)
BILIRUB SERPL-MCNC: 0.69 MG/DL (ref 0.2–1)
BUN SERPL-MCNC: 14 MG/DL (ref 5–25)
CALCIUM SERPL-MCNC: 9.3 MG/DL (ref 8.3–10.1)
CHLORIDE SERPL-SCNC: 110 MMOL/L (ref 100–108)
CHOLEST SERPL-MCNC: 125 MG/DL (ref 50–200)
CO2 SERPL-SCNC: 28 MMOL/L (ref 21–32)
CREAT SERPL-MCNC: 0.94 MG/DL (ref 0.6–1.3)
GFR SERPL CREATININE-BSD FRML MDRD: 89 ML/MIN/1.73SQ M
GLUCOSE P FAST SERPL-MCNC: 107 MG/DL (ref 65–99)
HDLC SERPL-MCNC: 19 MG/DL
LDLC SERPL CALC-MCNC: 60 MG/DL (ref 0–100)
NONHDLC SERPL-MCNC: 106 MG/DL
POTASSIUM SERPL-SCNC: 4.4 MMOL/L (ref 3.5–5.3)
PROT SERPL-MCNC: 7.7 G/DL (ref 6.4–8.2)
PSA SERPL-MCNC: 0.3 NG/ML (ref 0–4)
SODIUM SERPL-SCNC: 141 MMOL/L (ref 136–145)
TRIGL SERPL-MCNC: 228 MG/DL

## 2020-02-07 PROCEDURE — 99214 OFFICE O/P EST MOD 30 MIN: CPT | Performed by: PHYSICIAN ASSISTANT

## 2020-02-07 PROCEDURE — 36415 COLL VENOUS BLD VENIPUNCTURE: CPT | Performed by: PHYSICIAN ASSISTANT

## 2020-02-07 PROCEDURE — 1036F TOBACCO NON-USER: CPT | Performed by: PHYSICIAN ASSISTANT

## 2020-02-07 PROCEDURE — 3078F DIAST BP <80 MM HG: CPT | Performed by: PHYSICIAN ASSISTANT

## 2020-02-07 PROCEDURE — 80061 LIPID PANEL: CPT | Performed by: PHYSICIAN ASSISTANT

## 2020-02-07 PROCEDURE — 3008F BODY MASS INDEX DOCD: CPT | Performed by: PHYSICIAN ASSISTANT

## 2020-02-07 PROCEDURE — 80053 COMPREHEN METABOLIC PANEL: CPT | Performed by: PHYSICIAN ASSISTANT

## 2020-02-07 PROCEDURE — G0103 PSA SCREENING: HCPCS

## 2020-02-07 PROCEDURE — 3075F SYST BP GE 130 - 139MM HG: CPT | Performed by: PHYSICIAN ASSISTANT

## 2020-02-07 RX ORDER — ESCITALOPRAM OXALATE 10 MG/1
10 TABLET ORAL DAILY
Qty: 30 TABLET | Refills: 0 | Status: SHIPPED | OUTPATIENT
Start: 2020-02-07 | End: 2020-05-27 | Stop reason: SDUPTHER

## 2020-02-07 NOTE — PROGRESS NOTES
BMI Counseling: Body mass index is 30 83 kg/m²  The BMI is above normal  Nutrition recommendations include decreasing portion sizes, limiting drinks that contain sugar and moderation in carbohydrate intake  No pharmacotherapy was ordered  Assessment/Plan:     Diagnoses and all orders for this visit:    Coronary artery disease involving native coronary artery of native heart without angina pectoris  Comments:  No chest pain or signs of congestive heart failure  Mixed hyperlipidemia  Comments:  Continue fenofibrate and statin therapy  Low-fat diet  Proceed with labs    Depression, major, recurrent, mild (HCC)  Comments:  Depression is currently resolved and in remission  Continue Lexapro 10 mg  Orders:  -     escitalopram (LEXAPRO) 10 mg tablet; Take 1 tablet (10 mg total) by mouth daily    Anxiety  -     escitalopram (LEXAPRO) 10 mg tablet; Take 1 tablet (10 mg total) by mouth daily    Insomnia, unspecified type  Comments:  Trazodone is working well    Prediabetes  Comments:  Patient will continue low carb low sugar diet  Exercise encouraged  Benign prostatic hyperplasia with urinary hesitancy  Comments:  Check PSA level  May try alpha-blocker    Depression, major, recurrent, mild (HCC)  Comments:  Restart escitalopram 10 mg  Orders:  -     escitalopram (LEXAPRO) 10 mg tablet; Take 1 tablet (10 mg total) by mouth daily    Anxiety  Comments:  Restart escitalopram 10 mg  Continue trazodone 50 mg at night for sleep  Orders:  -     escitalopram (LEXAPRO) 10 mg tablet; Take 1 tablet (10 mg total) by mouth daily          Subjective:      Patient ID: Vanessa Thomson is a 63 y o  male  Patient presents for follow up chronic conditions  Patient has known coronary artery disease  He is on Plavix and aspirin  He is also on Imdur metoprolol  Patient does have a cardiologist   Patient has hyperlipidemia he is on fenofibrate and pravastatin    History of depression currently in remission he is on Lexapro 10 mg  For his insomnia he takes trazodone 50 mg half a tablet as needed  Patient states he has noticed some slowing of his urinary stream   Also some hesitancy  Increase in nocturia as well  Patient due for labs  We will check prostate level    We may need to start alpha-blocker      The following portions of the patient's history were reviewed and updated as appropriate:   He   Patient Active Problem List    Diagnosis Date Noted    Benign prostatic hyperplasia with urinary hesitancy 02/07/2020    Prediabetes 11/06/2019    Screening for prostate cancer 09/24/2018    Screen for colon cancer 09/24/2018    Hx of CABG 03/09/2018    Low HDL (under 40) 11/17/2017    Depression, major, recurrent, mild ([facility] ) 08/21/2017    Obesity 03/21/2016    Obstructive sleep apnea 04/14/2014    Mixed hyperlipidemia 08/22/2013    Anxiety 03/01/2012    Coronary artery disease involving native heart without angina pectoris 03/01/2012    Esophageal reflux 03/01/2012    Insomnia 03/01/2012    Restless legs syndrome 03/01/2012     Current Outpatient Medications   Medication Sig Dispense Refill    Ascorbic Acid (VITAMIN C PO) Take by mouth      aspirin 81 mg chewable tablet Chew 81 mg daily      B Complex-C (B-COMPLEX WITH VITAMIN C) tablet Take 1 tablet by mouth daily      clopidogrel (PLAVIX) 75 mg tablet Take 1 tablet (75 mg total) by mouth daily 90 tablet 3    Coenzyme Q10 200 MG capsule Take 200 mg by mouth daily      escitalopram (LEXAPRO) 10 mg tablet Take 1 tablet (10 mg total) by mouth daily 30 tablet 0    fenofibrate (TRICOR) 145 mg tablet Take 1 tablet (145 mg total) by mouth daily 90 tablet 3    isosorbide mononitrate (IMDUR) 30 mg 24 hr tablet TAKE 1 TABLET DAILY 90 tablet 3    metoprolol succinate (TOPROL-XL) 100 mg 24 hr tablet TAKE 1 TABLET DAILY 90 tablet 3    nitroglycerin (NITROSTAT) 0 4 mg SL tablet Place 0 4 mg under the tongue every 5 (five) minutes as needed        Omega-3 Fatty Acids (FISH OIL) 645 MG CAPS Take by mouth Daily      pravastatin (PRAVACHOL) 40 mg tablet TAKE 1 TABLET AT BEDTIME 90 tablet 3    traZODone (DESYREL) 50 mg tablet Take 1/2 tablet at bedtime  Increase to 1 tablet as needed  90 tablet 0     No current facility-administered medications for this visit  Current Outpatient Medications on File Prior to Visit   Medication Sig    Ascorbic Acid (VITAMIN C PO) Take by mouth    aspirin 81 mg chewable tablet Chew 81 mg daily    B Complex-C (B-COMPLEX WITH VITAMIN C) tablet Take 1 tablet by mouth daily    clopidogrel (PLAVIX) 75 mg tablet Take 1 tablet (75 mg total) by mouth daily    Coenzyme Q10 200 MG capsule Take 200 mg by mouth daily    fenofibrate (TRICOR) 145 mg tablet Take 1 tablet (145 mg total) by mouth daily    isosorbide mononitrate (IMDUR) 30 mg 24 hr tablet TAKE 1 TABLET DAILY    metoprolol succinate (TOPROL-XL) 100 mg 24 hr tablet TAKE 1 TABLET DAILY    nitroglycerin (NITROSTAT) 0 4 mg SL tablet Place 0 4 mg under the tongue every 5 (five) minutes as needed      Omega-3 Fatty Acids (FISH OIL) 645 MG CAPS Take by mouth Daily    pravastatin (PRAVACHOL) 40 mg tablet TAKE 1 TABLET AT BEDTIME    traZODone (DESYREL) 50 mg tablet Take 1/2 tablet at bedtime  Increase to 1 tablet as needed   [DISCONTINUED] escitalopram (LEXAPRO) 10 mg tablet Take 1 tablet (10 mg total) by mouth daily    [DISCONTINUED] acetaminophen-codeine (TYLENOL #3) 300-30 mg per tablet Take 1 tablet by mouth every 6 (six) hours as needed for moderate pain (Patient not taking: Reported on 2/7/2020)     No current facility-administered medications on file prior to visit       Review of Systems   Constitutional: Negative for activity change, appetite change, chills, fatigue and fever  HENT: Negative for ear pain and sore throat  Eyes: Negative for visual disturbance  Respiratory: Negative for cough and shortness of breath      Cardiovascular: Negative for chest pain, palpitations and leg swelling  Gastrointestinal: Negative for abdominal pain, blood in stool, constipation, diarrhea and nausea  Genitourinary: Negative for difficulty urinating  Hesitancy a nd  nocturia   Musculoskeletal: Negative for arthralgias, back pain and myalgias  Skin: Negative for rash  Neurological: Negative for dizziness, syncope and headaches  Psychiatric/Behavioral: Negative for sleep disturbance  Objective:        Physical Exam   Constitutional: He is oriented to person, place, and time  He appears well-developed and well-nourished  No distress  HENT:   Head: Normocephalic and atraumatic  Right Ear: Tympanic membrane, external ear and ear canal normal    Left Ear: Tympanic membrane, external ear and ear canal normal    Mouth/Throat: Oropharynx is clear and moist    Surgical  scar   Right  Parietal  area   Eyes: Pupils are equal, round, and reactive to light  Conjunctivae are normal    Neck: Carotid bruit is not present  No thyromegaly present  Cardiovascular: Normal rate, regular rhythm and normal heart sounds  No murmur heard  Pulmonary/Chest: Effort normal and breath sounds normal  He has no wheezes  Abdominal: Soft  Bowel sounds are normal  He exhibits no mass  There is no tenderness  Musculoskeletal: He exhibits no edema  Lymphadenopathy:     He has no cervical adenopathy  Neurological: He is alert and oriented to person, place, and time  Skin: Skin is warm and dry  No rash noted  He is not diaphoretic  No erythema  Psychiatric: He has a normal mood and affect  His behavior is normal  Judgment and thought content normal    Nursing note and vitals reviewed

## 2020-03-19 ENCOUNTER — NURSE TRIAGE (OUTPATIENT)
Dept: OTHER | Facility: OTHER | Age: 60
End: 2020-03-19

## 2020-03-19 DIAGNOSIS — Z20.828 SARS-ASSOCIATED CORONAVIRUS EXPOSURE: ICD-10-CM

## 2020-03-19 DIAGNOSIS — Z20.828 SARS-ASSOCIATED CORONAVIRUS EXPOSURE: Primary | ICD-10-CM

## 2020-03-19 PROCEDURE — 87635 SARS-COV-2 COVID-19 AMP PRB: CPT

## 2020-03-19 PROCEDURE — U0002 COVID-19 LAB TEST NON-CDC: HCPCS

## 2020-03-19 NOTE — TELEPHONE ENCOUNTER
Reason for Disposition   [1] Cough occurs AND [2] within 14 days of COVID-19 EXPOSURE    Answer Assessment - Initial Assessment Questions  1  PLACE of CONTACT: "Where were you when you were exposed to COVID-19  (coronavirus disease 2019)?" (e g , city, state, country)      Co-worker is renetta bernstein [place]  Co-worker attended a concert at [facility] and became ill  Denies travel exposure  Works in [place], [place] at a [employer]  2  TYPE of CONTACT: "How much contact was there?" (e g , live in same house, work in same office, same school)      Close contact with co-worker, with physical contact at times  3  DATE of CONTACT: "When did you have contact with a coronavirus patient?" (e g , days)      lst contact with person was 3/12/2020  4  DURATION of CONTACT: "How long were you in contact with the COVID-19 (coronavirus disease) patient?" (e g , a few seconds, passed by person, a few minutes, live with the patient)      9 hour shift  5  SYMPTOMS: "Do you have any symptoms?" (e g , fever, cough, breathing difficulty)      Onset of pain in lungs last night @ 2000  Moderate pain in left, upper back (chest) with deep breath or cough  Mild pain at rest in upper left back  SOB all the time last night while working  Can't take deep breaths  Denies respiratory distress right now  Persistent, dry cough  Runny nose with clear mucus  Mild sore throat  Denies diarrhea  Headaches and body aches  Temp  (oral): 99 @ 0030 this morning; 99 @ 0841  7  HIGH RISK: "Do you have any heart or lung problems? Do you have a weakened immune system?" (e g , CHF, COPD, asthma, HIV positive, chemotherapy, renal failure, diabetes mellitus, sickle cell anemia)      Open heart surgery 8 years ago for 4 blockages with cardiac arrest  CHF  Denies lung disease  Denies other causes of weakened immune system      Protocols used: CORONAVIRUS (COVID-19) EXPOSURE-ADULT-

## 2020-03-19 NOTE — TELEPHONE ENCOUNTER
Spoke to patient at 9:25am to clarify direction of where to report  He was advised he should not be reporting to the PCP office or to [facility]  Patient aware of [facility]rell location in [place] or [facility]  He is going to [place]  I advised to have ID ready, do not get out of car, he will be advised upon arrival of further instruction  Patient also advised he is now "considered" positive until test is returned  Must remain in isolation and anyone living within the home is now quarantined  Patient states understanding

## 2020-03-19 NOTE — TELEPHONE ENCOUNTER
Patient was called and notified that his appointment with his PCP was cancelled  He said he was already notified  I scheduled him at [address] [place] [address] at 1059 for both testing and evaluation

## 2020-03-19 NOTE — TELEPHONE ENCOUNTER
Regarding: body aches/painful breathing  ----- Message from [facility] sent at 3/19/2020  1:44 AM EDT -----  "My left lung when I breath is painful, chest pain, coughing, low grade fever 99 taken about hour ago and I have body aches "

## 2020-03-19 NOTE — TELEPHONE ENCOUNTER
iNno Maria at [facility] was notified that the patient is coming in for evaluation and testing @ [phone]

## 2020-03-19 NOTE — TELEPHONE ENCOUNTER
Per [facility] administrator, they do not evaluate patients in the office with symptoms of COVID-19

## 2020-03-19 NOTE — TELEPHONE ENCOUNTER
I cosigned this order for timeliness sake, but due to patient having an [facility] PCP ideally the order should go through [facility]  Once received I will forward results to the [address] [place] [address]  Thanks!

## 2020-03-20 ENCOUNTER — TELEMEDICINE (OUTPATIENT)
Dept: FAMILY MEDICINE CLINIC | Facility: CLINIC | Age: 60
End: 2020-03-20
Payer: COMMERCIAL

## 2020-03-20 ENCOUNTER — NURSE TRIAGE (OUTPATIENT)
Dept: OTHER | Facility: OTHER | Age: 60
End: 2020-03-20

## 2020-03-20 DIAGNOSIS — J06.9 VIRAL UPPER RESPIRATORY TRACT INFECTION: Primary | ICD-10-CM

## 2020-03-20 PROCEDURE — 3078F DIAST BP <80 MM HG: CPT | Performed by: PHYSICIAN ASSISTANT

## 2020-03-20 PROCEDURE — 99213 OFFICE O/P EST LOW 20 MIN: CPT | Performed by: PHYSICIAN ASSISTANT

## 2020-03-20 PROCEDURE — 3075F SYST BP GE 130 - 139MM HG: CPT | Performed by: PHYSICIAN ASSISTANT

## 2020-03-20 PROCEDURE — 1036F TOBACCO NON-USER: CPT | Performed by: PHYSICIAN ASSISTANT

## 2020-03-20 NOTE — PROGRESS NOTES
Virtual Brief Visit    Reason for visit is follow-up for possible corona virus  Patient was directed to 1 of the drive-through facilities yesterday and tested for corona virus  He has cough and congestion and low-grade fever  He does work in [employer]  He states his co-worker is sick as well and is awaiting his test results  Patient had no trouble at the drive through corona virus collection site  He is at home and he is self isolating  He is coughing he has no shortness of breath  He states he does have pain on the left side when he breathes or coughs  His fever is 99 °  He has a headache but no body aches no nausea vomiting or diarrhea    Encounter provider Hemal James PA-C    Provider located at [address] [place] [id number] [phone]      Recent Visits  No visits were found meeting these conditions  Showing recent visits within past 7 days and meeting all other requirements     Future Appointments  No visits were found meeting these conditions  Showing future appointments within next 150 days and meeting all other requirements        Patient agrees to participate in a virtual check in via telephone or video visit instead of presenting to the office to address urgent/immediate medical needs  Patient is aware this is a billable service  After connecting through telephone, the patient was identified by name and date of birth  Dulcemichelle Manning was informed that this was a telemedicine visit and that the visit is being conducted through telephone which may not be secure and therefore might not be HIPAA-compliant  Other methods to assure confidentiality were taken The following individuals were in the room with me and the patient informedDr  Traci Gutiérrez   He acknowledged consent and understanding of privacy and security of the virtual check-in visit    I informed the patient that I have reviewed his record in Epic and presented the opportunity for him to ask any questions regarding the visit today  The patient initiated communication and agreed to participate  Shelia Andrew is a 63 y o  male      Past Medical History:   Diagnosis Date    Anxiety     Coronary artery disease     Depression     Hyperlipidemia     Nephrolithiasis     Rotator cuff tear     Sleep apnea     Subdural hemorrhage-concussion ([address] )        Past Surgical History:   Procedure Laterality Date    BRAIN SURGERY      CARDIAC CATHETERIZATION      CORONARY ARTERY BYPASS GRAFT      HERNIA REPAIR      INGUINAL HERNIA REPAIR      OTHER SURGICAL HISTORY      cardiac cath procedure outcome - successful     SHOULDER SURGERY      arthroscopy     TONSILLECTOMY      VASECTOMY         Current Outpatient Medications   Medication Sig Dispense Refill    Ascorbic Acid (VITAMIN C PO) Take by mouth      aspirin 81 mg chewable tablet Chew 81 mg daily      B Complex-C (B-COMPLEX WITH VITAMIN C) tablet Take 1 tablet by mouth daily      clopidogrel (PLAVIX) 75 mg tablet Take 1 tablet (75 mg total) by mouth daily 90 tablet 3    Coenzyme Q10 200 MG capsule Take 200 mg by mouth daily      escitalopram (LEXAPRO) 10 mg tablet Take 1 tablet (10 mg total) by mouth daily 30 tablet 0    fenofibrate (TRICOR) 145 mg tablet Take 1 tablet (145 mg total) by mouth daily 90 tablet 3    isosorbide mononitrate (IMDUR) 30 mg 24 hr tablet TAKE 1 TABLET DAILY 90 tablet 3    metoprolol succinate (TOPROL-XL) 100 mg 24 hr tablet TAKE 1 TABLET DAILY 90 tablet 3    nitroglycerin (NITROSTAT) 0 4 mg SL tablet Place 0 4 mg under the tongue every 5 (five) minutes as needed        Omega-3 Fatty Acids (FISH OIL) 645 MG CAPS Take by mouth Daily      pravastatin (PRAVACHOL) 40 mg tablet TAKE 1 TABLET AT BEDTIME 90 tablet 3    traZODone (DESYREL) 50 mg tablet Take 1/2 tablet at bedtime  Increase to 1 tablet as needed   90 tablet 0     No current facility-administered medications for this visit  Allergies   Allergen Reactions    Iodinated Diagnostic Agents     Meloxicam     Omnipaque [Iohexol]      NEEDS BENADRYL BEFORE PROCEDURE       Assessment    Lea Class telephone assessment is uri  ruling  out  coronoa  virus   Disposition:  Patient is stable  Awaiting corona virus results  Explained to the patient if he has worsening shortness of breath worsening cough knee trouble breathing to call the office and we will for him to the emergency room      I spent 15 minutes with the patient during this virtual check-in visit

## 2020-03-23 ENCOUNTER — TELEMEDICINE (OUTPATIENT)
Dept: FAMILY MEDICINE CLINIC | Facility: CLINIC | Age: 60
End: 2020-03-23
Payer: COMMERCIAL

## 2020-03-23 DIAGNOSIS — Z20.828 EXPOSURE TO SARS-ASSOCIATED CORONAVIRUS: Primary | ICD-10-CM

## 2020-03-23 PROCEDURE — 99213 OFFICE O/P EST LOW 20 MIN: CPT | Performed by: PHYSICIAN ASSISTANT

## 2020-03-23 NOTE — PROGRESS NOTES
Virtual Regular Visit    Reason for visit is follow-up suspected corona virus    Encounter provider Nitesh Yanes PA-C    Provider located at [address] [place] [id number] [phone]      Recent Visits  No visits were found meeting these conditions  Showing recent visits within past 7 days and meeting all other requirements     Future Appointments  No visits were found meeting these conditions  Showing future appointments within next 150 days and meeting all other requirements        After connecting through [employer], the patient was identified by name and date of birth  Malorie Smith was informed that this is a telemedicine visit and that the visit is being conducted through [employer] which may not be secure and therefore, might not be HIPAA-compliant  My office door was closed  No one else was in the room  He acknowledged consent and understanding of privacy and security of the video platform  The patient has agreed to participate and understands they can discontinue the visit at any time  Papi Dexter is a 63 y o  male who was phone triage last week with possible corona virus symptoms  Patient was tested March 19th  Test results are pending  Patient states he does have now a known confirmed corona virus exposure  His friend whom with he works closely with became ill and his test came back positive today  Patient states he has fatigue but no further fever he has some mild nausea no vomiting appetite is okay he had some diarrhea Saturday and Sunday none today  Cough is now intermittent  Patient has no shortness of breath  On video patient appears well in no acute distress non diaphoretic  Patient will continue self isolation until his test results are back        Past Medical History:   Diagnosis Date    Anxiety     Coronary artery disease     Depression     Hyperlipidemia     Nephrolithiasis     Rotator cuff tear     Sleep apnea     [address]-conc[address] [address] )        Past Surgical History:   Procedure Laterality Date    BRAIN SURGERY      CARDIAC CATHETERIZATION      CORONARY ARTERY BYPASS GRAFT      HERNIA REPAIR      INGUINAL HERNIA REPAIR      OTHER SURGICAL HISTORY      cardiac cath procedure outcome - successful     SHOULDER SURGERY      arthroscopy     TONSILLECTOMY      VASECTOMY         Current Outpatient Medications   Medication Sig Dispense Refill    Ascorbic Acid (VITAMIN C PO) Take by mouth      aspirin 81 mg chewable tablet Chew 81 mg daily      B Complex-C (B-COMPLEX WITH VITAMIN C) tablet Take 1 tablet by mouth daily      clopidogrel (PLAVIX) 75 mg tablet Take 1 tablet (75 mg total) by mouth daily 90 tablet 3    Coenzyme Q10 200 MG capsule Take 200 mg by mouth daily      escitalopram (LEXAPRO) 10 mg tablet Take 1 tablet (10 mg total) by mouth daily 30 tablet 0    fenofibrate (TRICOR) 145 mg tablet Take 1 tablet (145 mg total) by mouth daily 90 tablet 3    isosorbide mononitrate (IMDUR) 30 mg 24 hr tablet TAKE 1 TABLET DAILY 90 tablet 3    metoprolol succinate (TOPROL-XL) 100 mg 24 hr tablet TAKE 1 TABLET DAILY 90 tablet 3    nitroglycerin (NITROSTAT) 0 4 mg SL tablet Place 0 4 mg under the tongue every 5 (five) minutes as needed        Omega-3 Fatty Acids (FISH OIL) 645 MG CAPS Take by mouth Daily      pravastatin (PRAVACHOL) 40 mg tablet TAKE 1 TABLET AT BEDTIME 90 tablet 3    traZODone (DESYREL) 50 mg tablet Take 1/2 tablet at bedtime  Increase to 1 tablet as needed  90 tablet 0     No current facility-administered medications for this visit  Allergies   Allergen Reactions    Iodinated Diagnostic Agents     Meloxicam     Omnipaque [Iohexol]      NEEDS BENADRYL BEFORE PROCEDURE           I spent 15 minutes with the patient during this visit      [id number]

## 2020-03-24 ENCOUNTER — TELEPHONE (OUTPATIENT)
Dept: FAMILY MEDICINE CLINIC | Facility: CLINIC | Age: 60
End: 2020-03-24

## 2020-03-24 DIAGNOSIS — F41.9 ANXIETY: Primary | ICD-10-CM

## 2020-03-24 RX ORDER — LORAZEPAM 0.5 MG/1
0.5 TABLET ORAL 2 TIMES DAILY
Qty: 30 TABLET | Refills: 0 | Status: SHIPPED | OUTPATIENT
Start: 2020-03-24 | End: 2020-08-29 | Stop reason: SDUPTHER

## 2020-03-24 NOTE — TELEPHONE ENCOUNTER
Spoke with patient  He started to become very anxious and unable to sleep  He is on isolation in his home  He is waiting for his corona virus test   He is tired he has no fever he is coughing a little bit    Patient has been on Xanax in the past   Patient states he is not suicidal he is very excited his daughter is going to have a baby and he is going to be a g father for the 1st time

## 2020-03-24 NOTE — TELEPHONE ENCOUNTER
Patient called in- he's on lockdown in his room per Guillermo Crowe and he's having trouble  Apart from feeling so sick he can't sleep and is very anxious  He took the trazodone to help him sleep but he didn't help  He's loaded with anxiety- could he have something to help with that too?      [facility] [address]

## 2020-03-25 ENCOUNTER — TELEMEDICINE (OUTPATIENT)
Dept: FAMILY MEDICINE CLINIC | Facility: CLINIC | Age: 60
End: 2020-03-25
Payer: COMMERCIAL

## 2020-03-25 DIAGNOSIS — Z20.828 EXPOSURE TO SARS-ASSOCIATED CORONAVIRUS: Primary | ICD-10-CM

## 2020-03-25 PROCEDURE — 99213 OFFICE O/P EST LOW 20 MIN: CPT | Performed by: PHYSICIAN ASSISTANT

## 2020-03-25 NOTE — PROGRESS NOTES
Virtual Regular Visit    Problem List Items Addressed This Visit        Other    Exposure to SARS-associated coronavirus - Primary               Reason for visit is follow-up upper respiratory symptoms and possible corona virus  Patient was tested on 03/19  Patient has been self isolation his current test results is still pending    Encounter provider Lance Benitez PA-C    Provider located at [address] [place] [address] [address]  [phone]      Recent Visits  Date Type Provider Dept   03/24/20 Telephone Rao Huynh recent visits within past 7 days and meeting all other requirements     Future Appointments  No visits were found meeting these conditions  Showing future appointments within next 150 days and meeting all other requirements        After connecting through [employer], the patient was identified by name and date of birth  Jenifer Estrada was informed that this is a telemedicine visit and that the visit is being conducted through [facility] which may not be secure and therefore, might not be HIPAA-compliant  My office door was closed  No one else was in the room  He acknowledged consent and understanding of privacy and security of the video platform  The patient has agreed to participate and understands they can discontinue the visit at any time  Subjective  Jenifer Estrada is a 63 y o  male being contacted by video visit for follow-up upper respiratory infection and possible corona virus  Patient was tested March 19th  He has been in self isolation  Test results are still pending  Patient states he has a cough which is intermittent  His chest does hurt he is not short of breath does not have a fever today he is very tired has body aches and headache  We called him in some Xanax yesterday    Patient states he did sleep much better last night overall patient is stable and has some interval improvement      Past Medical History:   Diagnosis Date    Anxiety     Coronary artery disease     Depression     Hyperlipidemia     Nephrolithiasis     Rotator cuff tear     Sleep apnea     Subdural hemorrhage-concussion ([address] )        Past Surgical History:   Procedure Laterality Date    BRAIN SURGERY      CARDIAC CATHETERIZATION      CORONARY ARTERY BYPASS GRAFT      HERNIA REPAIR      INGUINAL HERNIA REPAIR      OTHER SURGICAL HISTORY      cardiac cath procedure outcome - successful     SHOULDER SURGERY      arthroscopy     TONSILLECTOMY      VASECTOMY         Current Outpatient Medications   Medication Sig Dispense Refill    Ascorbic Acid (VITAMIN C PO) Take by mouth      aspirin 81 mg chewable tablet Chew 81 mg daily      B Complex-C (B-COMPLEX WITH VITAMIN C) tablet Take 1 tablet by mouth daily      clopidogrel (PLAVIX) 75 mg tablet Take 1 tablet (75 mg total) by mouth daily 90 tablet 3    Coenzyme Q10 200 MG capsule Take 200 mg by mouth daily      escitalopram (LEXAPRO) 10 mg tablet Take 1 tablet (10 mg total) by mouth daily 30 tablet 0    fenofibrate (TRICOR) 145 mg tablet Take 1 tablet (145 mg total) by mouth daily 90 tablet 3    isosorbide mononitrate (IMDUR) 30 mg 24 hr tablet TAKE 1 TABLET DAILY 90 tablet 3    LORazepam (ATIVAN) 0 5 mg tablet Take 1 tablet (0 5 mg total) by mouth 2 (two) times a day 30 tablet 0    metoprolol succinate (TOPROL-XL) 100 mg 24 hr tablet TAKE 1 TABLET DAILY 90 tablet 3    nitroglycerin (NITROSTAT) 0 4 mg SL tablet Place 0 4 mg under the tongue every 5 (five) minutes as needed        Omega-3 Fatty Acids (FISH OIL) 645 MG CAPS Take by mouth Daily      pravastatin (PRAVACHOL) 40 mg tablet TAKE 1 TABLET AT BEDTIME 90 tablet 3    traZODone (DESYREL) 50 mg tablet Take 1/2 tablet at bedtime  Increase to 1 tablet as needed  90 tablet 0     No current facility-administered medications for this visit           Allergies   Allergen Reactions    Iodinated Diagnostic Agents     Meloxicam     Omnipaque [Iohexol]      NEEDS BENADRYL BEFORE PROCEDURE       Review of Systems   Constitutional: Positive for fatigue  Negative for fever  HENT: Negative for congestion  Respiratory: Positive for cough  Negative for shortness of breath  Musculoskeletal: Positive for myalgias  Neurological: Positive for headaches  Physical Exam   Constitutional: He is oriented to person, place, and time  He appears well-developed and well-nourished  No distress  Pulmonary/Chest: Effort normal    Neurological: He is alert and oriented to person, place, and time  Skin: No rash noted  He is not diaphoretic  No erythema  I spent 13 minutes with the patient during this visit

## 2020-03-26 ENCOUNTER — TELEPHONE (OUTPATIENT)
Dept: FAMILY MEDICINE CLINIC | Facility: CLINIC | Age: 60
End: 2020-03-26

## 2020-03-26 LAB — SARS-COV-2 RNA SPEC QL NAA+PROBE: NOT DETECTED

## 2020-03-26 NOTE — LETTER
March 26, 2020     Patient: Devota Kayser   YOB: 1960   Date of Visit: 3/26/2020       To Whom it May Concern:    Alejandro Stein is under my professional care  He was seen in my office on 3/26/2020  He may return to work on 3/30/2020   3/30/2020  If you have any questions or concerns, please don't hesitate to call           Sincerely,          Oswaldo Vyas PA-C        CC: No Recipients

## 2020-03-31 ENCOUNTER — TELEPHONE (OUTPATIENT)
Dept: CARDIOLOGY CLINIC | Facility: MEDICAL CENTER | Age: 60
End: 2020-03-31

## 2020-03-31 NOTE — TELEPHONE ENCOUNTER
Due to the current pandemic of COVID-19 patient was given the option to par take in a video/telephone call, which was accepted by the patient  Patient was informed via telephone the following: There is a possibility of a copay to participate in this Virtual Visit  This is depending on your insurance company if the will cover that cost     Patients preferred phone number to contact is _____[phone]________________  Video option - Patients preferred [email or web address]  Com___________________________  Patient informed that they will receive an e-mail 15 minutes before their scheduled time as a reminder

## 2020-04-02 ENCOUNTER — TELEPHONE (OUTPATIENT)
Dept: CARDIOLOGY CLINIC | Facility: CLINIC | Age: 60
End: 2020-04-02

## 2020-04-07 ENCOUNTER — TELEMEDICINE (OUTPATIENT)
Dept: CARDIOLOGY CLINIC | Facility: CLINIC | Age: 60
End: 2020-04-07
Payer: COMMERCIAL

## 2020-04-07 VITALS
SYSTOLIC BLOOD PRESSURE: 137 MMHG | DIASTOLIC BLOOD PRESSURE: 100 MMHG | WEIGHT: 185 LBS | BODY MASS INDEX: 29.86 KG/M2 | HEART RATE: 62 BPM

## 2020-04-07 DIAGNOSIS — I25.10 CORONARY ARTERY DISEASE INVOLVING NATIVE CORONARY ARTERY OF NATIVE HEART WITHOUT ANGINA PECTORIS: ICD-10-CM

## 2020-04-07 DIAGNOSIS — R73.03 PREDIABETES: ICD-10-CM

## 2020-04-07 DIAGNOSIS — Z95.1 HX OF CABG: ICD-10-CM

## 2020-04-07 DIAGNOSIS — G47.33 OBSTRUCTIVE SLEEP APNEA: Primary | ICD-10-CM

## 2020-04-07 DIAGNOSIS — I25.10 CORONARY ARTERY DISEASE INVOLVING NATIVE HEART WITHOUT ANGINA PECTORIS, UNSPECIFIED VESSEL OR LESION TYPE: ICD-10-CM

## 2020-04-07 DIAGNOSIS — E78.2 MIXED HYPERLIPIDEMIA: ICD-10-CM

## 2020-04-07 PROCEDURE — 99213 OFFICE O/P EST LOW 20 MIN: CPT | Performed by: INTERNAL MEDICINE

## 2020-04-07 RX ORDER — ISOSORBIDE MONONITRATE 60 MG/1
60 TABLET, EXTENDED RELEASE ORAL DAILY
Qty: 90 TABLET | Refills: 3 | Status: SHIPPED | OUTPATIENT
Start: 2020-04-07 | End: 2020-12-01

## 2020-05-06 PROBLEM — Z20.828 EXPOSURE TO SARS-ASSOCIATED CORONAVIRUS: Status: RESOLVED | Noted: 2020-03-25 | Resolved: 2020-05-06

## 2020-05-11 ENCOUNTER — OFFICE VISIT (OUTPATIENT)
Dept: FAMILY MEDICINE CLINIC | Facility: CLINIC | Age: 60
End: 2020-05-11
Payer: COMMERCIAL

## 2020-05-11 VITALS
HEART RATE: 110 BPM | DIASTOLIC BLOOD PRESSURE: 85 MMHG | WEIGHT: 185 LBS | SYSTOLIC BLOOD PRESSURE: 130 MMHG | BODY MASS INDEX: 29.73 KG/M2 | HEIGHT: 66 IN

## 2020-05-11 DIAGNOSIS — R73.03 PREDIABETES: ICD-10-CM

## 2020-05-11 DIAGNOSIS — E78.6 LOW HDL (UNDER 40): ICD-10-CM

## 2020-05-11 DIAGNOSIS — N40.1 BENIGN PROSTATIC HYPERPLASIA WITH URINARY HESITANCY: ICD-10-CM

## 2020-05-11 DIAGNOSIS — E78.2 MIXED HYPERLIPIDEMIA: ICD-10-CM

## 2020-05-11 DIAGNOSIS — I25.10 CORONARY ARTERY DISEASE INVOLVING NATIVE CORONARY ARTERY OF NATIVE HEART WITHOUT ANGINA PECTORIS: Primary | ICD-10-CM

## 2020-05-11 DIAGNOSIS — I10 ESSENTIAL HYPERTENSION: ICD-10-CM

## 2020-05-11 DIAGNOSIS — R39.11 BENIGN PROSTATIC HYPERPLASIA WITH URINARY HESITANCY: ICD-10-CM

## 2020-05-11 PROCEDURE — 99214 OFFICE O/P EST MOD 30 MIN: CPT | Performed by: PHYSICIAN ASSISTANT

## 2020-05-19 DIAGNOSIS — E78.2 MIXED HYPERLIPIDEMIA: ICD-10-CM

## 2020-05-19 RX ORDER — FENOFIBRATE 145 MG/1
TABLET, COATED ORAL
Qty: 90 TABLET | Refills: 3 | Status: SHIPPED | OUTPATIENT
Start: 2020-05-19 | End: 2021-05-10 | Stop reason: SDUPTHER

## 2020-05-27 ENCOUNTER — TELEMEDICINE (OUTPATIENT)
Dept: FAMILY MEDICINE CLINIC | Facility: CLINIC | Age: 60
End: 2020-05-27
Payer: COMMERCIAL

## 2020-05-27 VITALS
WEIGHT: 185 LBS | HEART RATE: 58 BPM | DIASTOLIC BLOOD PRESSURE: 83 MMHG | BODY MASS INDEX: 29.73 KG/M2 | HEIGHT: 66 IN | SYSTOLIC BLOOD PRESSURE: 127 MMHG

## 2020-05-27 DIAGNOSIS — F41.9 ANXIETY: ICD-10-CM

## 2020-05-27 DIAGNOSIS — F33.0 DEPRESSION, MAJOR, RECURRENT, MILD (HCC): ICD-10-CM

## 2020-05-27 DIAGNOSIS — I25.10 CORONARY ARTERY DISEASE INVOLVING NATIVE CORONARY ARTERY OF NATIVE HEART WITHOUT ANGINA PECTORIS: Primary | ICD-10-CM

## 2020-05-27 PROCEDURE — 3074F SYST BP LT 130 MM HG: CPT | Performed by: PHYSICIAN ASSISTANT

## 2020-05-27 PROCEDURE — 3079F DIAST BP 80-89 MM HG: CPT | Performed by: PHYSICIAN ASSISTANT

## 2020-05-27 PROCEDURE — 99214 OFFICE O/P EST MOD 30 MIN: CPT | Performed by: PHYSICIAN ASSISTANT

## 2020-05-27 RX ORDER — ESCITALOPRAM OXALATE 10 MG/1
10 TABLET ORAL DAILY
Qty: 90 TABLET | Refills: 1 | Status: SHIPPED | OUTPATIENT
Start: 2020-05-27 | End: 2021-04-21 | Stop reason: SDUPTHER

## 2020-06-04 ENCOUNTER — HOSPITAL ENCOUNTER (OUTPATIENT)
Dept: NON INVASIVE DIAGNOSTICS | Facility: CLINIC | Age: 60
Discharge: HOME | End: 2020-06-04
Payer: COMMERCIAL

## 2020-06-04 DIAGNOSIS — I25.10 CORONARY ARTERY DISEASE INVOLVING NATIVE CORONARY ARTERY OF NATIVE HEART WITHOUT ANGINA PECTORIS: ICD-10-CM

## 2020-06-04 PROCEDURE — 93306 TTE W/DOPPLER COMPLETE: CPT | Performed by: INTERNAL MEDICINE

## 2020-06-04 PROCEDURE — 93306 TTE W/DOPPLER COMPLETE: CPT

## 2020-06-16 ENCOUNTER — OFFICE VISIT (OUTPATIENT)
Dept: CARDIOLOGY CLINIC | Facility: MEDICAL CENTER | Age: 60
End: 2020-06-16
Payer: COMMERCIAL

## 2020-06-16 VITALS — OXYGEN SATURATION: 97 % | HEIGHT: 66 IN | BODY MASS INDEX: 30.22 KG/M2 | HEART RATE: 47 BPM | WEIGHT: 188 LBS

## 2020-06-16 DIAGNOSIS — G47.33 OBSTRUCTIVE SLEEP APNEA: ICD-10-CM

## 2020-06-16 DIAGNOSIS — S81.802S WOUND OF LEFT LOWER EXTREMITY, SEQUELA: ICD-10-CM

## 2020-06-16 DIAGNOSIS — Z95.1 HX OF CABG: ICD-10-CM

## 2020-06-16 DIAGNOSIS — I25.10 CORONARY ARTERY DISEASE INVOLVING NATIVE HEART WITHOUT ANGINA PECTORIS, UNSPECIFIED VESSEL OR LESION TYPE: Primary | ICD-10-CM

## 2020-06-16 DIAGNOSIS — E78.2 MIXED HYPERLIPIDEMIA: ICD-10-CM

## 2020-06-16 PROCEDURE — 93000 ELECTROCARDIOGRAM COMPLETE: CPT | Performed by: INTERNAL MEDICINE

## 2020-06-16 PROCEDURE — 1036F TOBACCO NON-USER: CPT | Performed by: INTERNAL MEDICINE

## 2020-06-16 PROCEDURE — 99214 OFFICE O/P EST MOD 30 MIN: CPT | Performed by: INTERNAL MEDICINE

## 2020-06-16 PROCEDURE — 3008F BODY MASS INDEX DOCD: CPT | Performed by: INTERNAL MEDICINE

## 2020-06-16 PROCEDURE — 3079F DIAST BP 80-89 MM HG: CPT | Performed by: INTERNAL MEDICINE

## 2020-06-16 PROCEDURE — 3074F SYST BP LT 130 MM HG: CPT | Performed by: INTERNAL MEDICINE

## 2020-06-16 RX ORDER — RANOLAZINE 500 MG/1
500 TABLET, EXTENDED RELEASE ORAL 2 TIMES DAILY
Qty: 180 TABLET | Refills: 3 | Status: SHIPPED | OUTPATIENT
Start: 2020-06-16 | End: 2020-11-14 | Stop reason: HOSPADM

## 2020-07-06 ENCOUNTER — TELEPHONE (OUTPATIENT)
Dept: FAMILY MEDICINE CLINIC | Facility: CLINIC | Age: 60
End: 2020-07-06

## 2020-07-06 NOTE — TELEPHONE ENCOUNTER
Patient called- he was down in [place] for 14 days and came back on Friday July 3ed  He can't come back to work till he has a negative Covid test  He's experiencing no symptoms        Gabriel Reed

## 2020-07-07 ENCOUNTER — TELEPHONE (OUTPATIENT)
Dept: FAMILY MEDICINE CLINIC | Facility: CLINIC | Age: 60
End: 2020-07-07

## 2020-07-07 DIAGNOSIS — Z20.822 EXPOSURE TO COVID-19 VIRUS: Primary | ICD-10-CM

## 2020-07-08 DIAGNOSIS — Z20.822 EXPOSURE TO COVID-19 VIRUS: ICD-10-CM

## 2020-07-08 PROCEDURE — U0003 INFECTIOUS AGENT DETECTION BY NUCLEIC ACID (DNA OR RNA); SEVERE ACUTE RESPIRATORY SYNDROME CORONAVIRUS 2 (SARS-COV-2) (CORONAVIRUS DISEASE [COVID-19]), AMPLIFIED PROBE TECHNIQUE, MAKING USE OF HIGH THROUGHPUT TECHNOLOGIES AS DESCRIBED BY CMS-2020-01-R: HCPCS

## 2020-07-15 ENCOUNTER — TELEPHONE (OUTPATIENT)
Dept: FAMILY MEDICINE CLINIC | Facility: CLINIC | Age: 60
End: 2020-07-15

## 2020-07-15 LAB — SARS-COV-2 RNA SPEC QL NAA+PROBE: NOT DETECTED

## 2020-07-15 NOTE — TELEPHONE ENCOUNTER
Spoke with patient his COVID test is negative    Patient states since he came back from [place] he still needs to finish his quarantine of 2 weeks before his employer will let him return

## 2020-07-15 NOTE — TELEPHONE ENCOUNTER
----- Message from Yue Quintana MD sent at 7/15/2020 12:27 PM EDT -----      ----- Message -----  From: Lab, Background User  Sent: 7/15/2020   1:05 AM EDT  To: Yue Quintana MD

## 2020-07-24 DIAGNOSIS — I25.10 CORONARY ARTERY DISEASE INVOLVING NATIVE CORONARY ARTERY OF NATIVE HEART WITHOUT ANGINA PECTORIS: ICD-10-CM

## 2020-07-24 RX ORDER — CLOPIDOGREL BISULFATE 75 MG/1
75 TABLET ORAL DAILY
Qty: 90 TABLET | Refills: 1 | Status: SHIPPED | OUTPATIENT
Start: 2020-07-24 | End: 2021-01-18

## 2020-08-27 RX ORDER — ESCITALOPRAM OXALATE 10 MG/1
TABLET ORAL
Qty: 90 TABLET | Refills: 1 | Status: SHIPPED | OUTPATIENT
Start: 2020-08-27 | End: 2021-04-21

## 2020-08-29 DIAGNOSIS — F41.9 ANXIETY: ICD-10-CM

## 2020-08-29 DIAGNOSIS — G47.00 INSOMNIA, UNSPECIFIED TYPE: ICD-10-CM

## 2020-09-01 RX ORDER — LORAZEPAM 0.5 MG/1
0.5 TABLET ORAL 2 TIMES DAILY
Qty: 30 TABLET | Refills: 0 | Status: SHIPPED | OUTPATIENT
Start: 2020-09-01 | End: 2021-03-08 | Stop reason: SDUPTHER

## 2020-09-01 RX ORDER — TRAZODONE HYDROCHLORIDE 50 MG/1
TABLET ORAL
Qty: 90 TABLET | Refills: 0 | Status: SHIPPED | OUTPATIENT
Start: 2020-09-01 | End: 2020-12-21

## 2020-09-08 ENCOUNTER — TELEPHONE (OUTPATIENT)
Dept: OTHER | Facility: OTHER | Age: 60
End: 2020-09-08

## 2020-09-11 ENCOUNTER — CLINICAL SUPPORT (OUTPATIENT)
Dept: FAMILY MEDICINE CLINIC | Facility: CLINIC | Age: 60
End: 2020-09-11
Payer: COMMERCIAL

## 2020-09-11 DIAGNOSIS — Z23 ENCOUNTER FOR IMMUNIZATION: Primary | ICD-10-CM

## 2020-09-11 PROCEDURE — 90472 IMMUNIZATION ADMIN EACH ADD: CPT

## 2020-09-11 PROCEDURE — 90682 RIV4 VACC RECOMBINANT DNA IM: CPT

## 2020-09-11 PROCEDURE — 90471 IMMUNIZATION ADMIN: CPT

## 2020-09-11 PROCEDURE — 90715 TDAP VACCINE 7 YRS/> IM: CPT

## 2020-11-12 ENCOUNTER — HOSPITAL ENCOUNTER (INPATIENT)
Facility: HOSPITAL | Age: 60
LOS: 1 days | Discharge: HOME | DRG: 287 | End: 2020-11-14
Attending: EMERGENCY MEDICINE | Admitting: INTERNAL MEDICINE
Payer: COMMERCIAL

## 2020-11-12 ENCOUNTER — APPOINTMENT (OUTPATIENT)
Dept: CT IMAGING | Facility: HOSPITAL | Age: 60
DRG: 287 | End: 2020-11-12
Payer: COMMERCIAL

## 2020-11-12 ENCOUNTER — NURSE TRIAGE (OUTPATIENT)
Dept: OTHER | Facility: OTHER | Age: 60
End: 2020-11-12

## 2020-11-12 ENCOUNTER — APPOINTMENT (EMERGENCY)
Dept: RADIOLOGY | Facility: HOSPITAL | Age: 60
DRG: 287 | End: 2020-11-12
Payer: COMMERCIAL

## 2020-11-12 DIAGNOSIS — R07.9 CHEST PAIN: Primary | ICD-10-CM

## 2020-11-12 PROBLEM — I10 ESSENTIAL HYPERTENSION: Status: ACTIVE | Noted: 2020-11-12

## 2020-11-12 PROBLEM — I20.0 UNSTABLE ANGINA (HCC): Status: ACTIVE | Noted: 2020-11-12

## 2020-11-12 LAB
ALBUMIN SERPL BCP-MCNC: 4.5 G/DL (ref 3.5–5)
ALP SERPL-CCNC: 63 U/L (ref 46–116)
ALT SERPL W P-5'-P-CCNC: 36 U/L (ref 12–78)
ANION GAP SERPL CALCULATED.3IONS-SCNC: 10 MMOL/L (ref 4–13)
APTT PPP: 33 SECONDS (ref 23–37)
AST SERPL W P-5'-P-CCNC: 30 U/L (ref 5–45)
ATRIAL RATE: 51 BPM
ATRIAL RATE: 53 BPM
ATRIAL RATE: 59 BPM
BASOPHILS # BLD AUTO: 0.04 THOUSANDS/ΜL (ref 0–0.1)
BASOPHILS NFR BLD AUTO: 1 % (ref 0–1)
BILIRUB SERPL-MCNC: 0.5 MG/DL (ref 0.2–1)
BUN SERPL-MCNC: 23 MG/DL (ref 5–25)
CALCIUM SERPL-MCNC: 10.2 MG/DL (ref 8.3–10.1)
CHLORIDE SERPL-SCNC: 104 MMOL/L (ref 100–108)
CO2 SERPL-SCNC: 25 MMOL/L (ref 21–32)
CREAT SERPL-MCNC: 1.01 MG/DL (ref 0.6–1.3)
EOSINOPHIL # BLD AUTO: 0.08 THOUSAND/ΜL (ref 0–0.61)
EOSINOPHIL NFR BLD AUTO: 1 % (ref 0–6)
ERYTHROCYTE [DISTWIDTH] IN BLOOD BY AUTOMATED COUNT: 13.1 % (ref 11.6–15.1)
ERYTHROCYTE [DISTWIDTH] IN BLOOD BY AUTOMATED COUNT: 13.2 % (ref 11.6–15.1)
GFR SERPL CREATININE-BSD FRML MDRD: 81 ML/MIN/1.73SQ M
GLUCOSE SERPL-MCNC: 113 MG/DL (ref 65–140)
HCT VFR BLD AUTO: 40.2 % (ref 36.5–49.3)
HCT VFR BLD AUTO: 42.7 % (ref 36.5–49.3)
HGB BLD-MCNC: 13.5 G/DL (ref 12–17)
HGB BLD-MCNC: 14.1 G/DL (ref 12–17)
IMM GRANULOCYTES # BLD AUTO: 0.03 THOUSAND/UL (ref 0–0.2)
IMM GRANULOCYTES NFR BLD AUTO: 0 % (ref 0–2)
INR PPP: 0.96 (ref 0.84–1.19)
LYMPHOCYTES # BLD AUTO: 2.13 THOUSANDS/ΜL (ref 0.6–4.47)
LYMPHOCYTES NFR BLD AUTO: 27 % (ref 14–44)
MCH RBC QN AUTO: 28.1 PG (ref 26.8–34.3)
MCH RBC QN AUTO: 28.4 PG (ref 26.8–34.3)
MCHC RBC AUTO-ENTMCNC: 33 G/DL (ref 31.4–37.4)
MCHC RBC AUTO-ENTMCNC: 33.6 G/DL (ref 31.4–37.4)
MCV RBC AUTO: 85 FL (ref 82–98)
MCV RBC AUTO: 85 FL (ref 82–98)
MONOCYTES # BLD AUTO: 0.78 THOUSAND/ΜL (ref 0.17–1.22)
MONOCYTES NFR BLD AUTO: 10 % (ref 4–12)
NEUTROPHILS # BLD AUTO: 4.78 THOUSANDS/ΜL (ref 1.85–7.62)
NEUTS SEG NFR BLD AUTO: 61 % (ref 43–75)
NRBC BLD AUTO-RTO: 0 /100 WBCS
P AXIS: 12 DEGREES
P AXIS: 14 DEGREES
P AXIS: 66 DEGREES
PLATELET # BLD AUTO: 216 THOUSANDS/UL (ref 149–390)
PLATELET # BLD AUTO: 249 THOUSANDS/UL (ref 149–390)
PMV BLD AUTO: 9 FL (ref 8.9–12.7)
PMV BLD AUTO: 9.4 FL (ref 8.9–12.7)
POTASSIUM SERPL-SCNC: 4 MMOL/L (ref 3.5–5.3)
PR INTERVAL: 172 MS
PR INTERVAL: 174 MS
PR INTERVAL: 174 MS
PROT SERPL-MCNC: 7.8 G/DL (ref 6.4–8.2)
PROTHROMBIN TIME: 13 SECONDS (ref 11.6–14.5)
QRS AXIS: -15 DEGREES
QRSD INTERVAL: 94 MS
QRSD INTERVAL: 96 MS
QRSD INTERVAL: 96 MS
QT INTERVAL: 450 MS
QT INTERVAL: 456 MS
QT INTERVAL: 462 MS
QTC INTERVAL: 425 MS
QTC INTERVAL: 427 MS
QTC INTERVAL: 445 MS
RBC # BLD AUTO: 4.76 MILLION/UL (ref 3.88–5.62)
RBC # BLD AUTO: 5.02 MILLION/UL (ref 3.88–5.62)
SODIUM SERPL-SCNC: 139 MMOL/L (ref 136–145)
T WAVE AXIS: 142 DEGREES
T WAVE AXIS: 146 DEGREES
T WAVE AXIS: 148 DEGREES
TROPONIN I SERPL-MCNC: <0.02 NG/ML
TROPONIN I SERPL-MCNC: <0.02 NG/ML
VENTRICULAR RATE: 51 BPM
VENTRICULAR RATE: 53 BPM
VENTRICULAR RATE: 59 BPM
WBC # BLD AUTO: 7.4 THOUSAND/UL (ref 4.31–10.16)
WBC # BLD AUTO: 7.84 THOUSAND/UL (ref 4.31–10.16)

## 2020-11-12 PROCEDURE — 99285 EMERGENCY DEPT VISIT HI MDM: CPT

## 2020-11-12 PROCEDURE — 93005 ELECTROCARDIOGRAM TRACING: CPT

## 2020-11-12 PROCEDURE — 84484 ASSAY OF TROPONIN QUANT: CPT | Performed by: EMERGENCY MEDICINE

## 2020-11-12 PROCEDURE — 85027 COMPLETE CBC AUTOMATED: CPT | Performed by: INTERNAL MEDICINE

## 2020-11-12 PROCEDURE — 93010 ELECTROCARDIOGRAM REPORT: CPT | Performed by: INTERNAL MEDICINE

## 2020-11-12 PROCEDURE — 80053 COMPREHEN METABOLIC PANEL: CPT | Performed by: EMERGENCY MEDICINE

## 2020-11-12 PROCEDURE — 70450 CT HEAD/BRAIN W/O DYE: CPT

## 2020-11-12 PROCEDURE — 85025 COMPLETE CBC W/AUTO DIFF WBC: CPT | Performed by: EMERGENCY MEDICINE

## 2020-11-12 PROCEDURE — 85730 THROMBOPLASTIN TIME PARTIAL: CPT | Performed by: INTERNAL MEDICINE

## 2020-11-12 PROCEDURE — 99215 OFFICE O/P EST HI 40 MIN: CPT | Performed by: INTERNAL MEDICINE

## 2020-11-12 PROCEDURE — 99220 PR INITIAL OBSERVATION CARE/DAY 70 MINUTES: CPT | Performed by: INTERNAL MEDICINE

## 2020-11-12 PROCEDURE — 36415 COLL VENOUS BLD VENIPUNCTURE: CPT | Performed by: EMERGENCY MEDICINE

## 2020-11-12 PROCEDURE — 71045 X-RAY EXAM CHEST 1 VIEW: CPT

## 2020-11-12 PROCEDURE — 99285 EMERGENCY DEPT VISIT HI MDM: CPT | Performed by: EMERGENCY MEDICINE

## 2020-11-12 PROCEDURE — 84484 ASSAY OF TROPONIN QUANT: CPT | Performed by: PHYSICIAN ASSISTANT

## 2020-11-12 PROCEDURE — G1004 CDSM NDSC: HCPCS

## 2020-11-12 PROCEDURE — 85610 PROTHROMBIN TIME: CPT | Performed by: INTERNAL MEDICINE

## 2020-11-12 RX ORDER — PRAVASTATIN SODIUM 40 MG
40 TABLET ORAL
Status: DISCONTINUED | OUTPATIENT
Start: 2020-11-12 | End: 2020-11-14 | Stop reason: HOSPADM

## 2020-11-12 RX ORDER — ACETAMINOPHEN 325 MG/1
650 TABLET ORAL EVERY 6 HOURS PRN
Status: DISCONTINUED | OUTPATIENT
Start: 2020-11-12 | End: 2020-11-14 | Stop reason: HOSPADM

## 2020-11-12 RX ORDER — FAMOTIDINE 20 MG/1
20 TABLET, FILM COATED ORAL 2 TIMES DAILY
Status: DISCONTINUED | OUTPATIENT
Start: 2020-11-12 | End: 2020-11-14 | Stop reason: HOSPADM

## 2020-11-12 RX ORDER — HEPARIN SODIUM 10000 [USP'U]/100ML
3-20 INJECTION, SOLUTION INTRAVENOUS
Status: DISCONTINUED | OUTPATIENT
Start: 2020-11-12 | End: 2020-11-13

## 2020-11-12 RX ORDER — CLOPIDOGREL BISULFATE 75 MG/1
75 TABLET ORAL DAILY
Status: DISCONTINUED | OUTPATIENT
Start: 2020-11-12 | End: 2020-11-14 | Stop reason: HOSPADM

## 2020-11-12 RX ORDER — ESCITALOPRAM OXALATE 10 MG/1
10 TABLET ORAL DAILY
Status: DISCONTINUED | OUTPATIENT
Start: 2020-11-12 | End: 2020-11-14 | Stop reason: HOSPADM

## 2020-11-12 RX ORDER — ASPIRIN 81 MG/1
81 TABLET, CHEWABLE ORAL DAILY
Status: DISCONTINUED | OUTPATIENT
Start: 2020-11-12 | End: 2020-11-14 | Stop reason: HOSPADM

## 2020-11-12 RX ORDER — NITROGLYCERIN 0.4 MG/1
0.4 TABLET SUBLINGUAL
Status: DISCONTINUED | OUTPATIENT
Start: 2020-11-12 | End: 2020-11-14 | Stop reason: HOSPADM

## 2020-11-12 RX ORDER — RANOLAZINE 500 MG/1
500 TABLET, EXTENDED RELEASE ORAL EVERY 12 HOURS SCHEDULED
Status: DISCONTINUED | OUTPATIENT
Start: 2020-11-12 | End: 2020-11-13

## 2020-11-12 RX ORDER — LORAZEPAM 1 MG/1
1 TABLET ORAL DAILY PRN
Status: DISCONTINUED | OUTPATIENT
Start: 2020-11-12 | End: 2020-11-14 | Stop reason: HOSPADM

## 2020-11-12 RX ORDER — LORAZEPAM 1 MG/1
1 TABLET ORAL ONCE AS NEEDED
Status: DISCONTINUED | OUTPATIENT
Start: 2020-11-12 | End: 2020-11-12

## 2020-11-12 RX ORDER — HEPARIN SODIUM 10000 [USP'U]/100ML
3-20 INJECTION, SOLUTION INTRAVENOUS
Status: DISCONTINUED | OUTPATIENT
Start: 2020-11-12 | End: 2020-11-12

## 2020-11-12 RX ORDER — ISOSORBIDE MONONITRATE 30 MG/1
30 TABLET, EXTENDED RELEASE ORAL DAILY
Status: DISCONTINUED | OUTPATIENT
Start: 2020-11-12 | End: 2020-11-13

## 2020-11-12 RX ADMIN — HEPARIN SODIUM 11.8 UNITS/KG/HR: 10000 INJECTION, SOLUTION INTRAVENOUS at 18:31

## 2020-11-12 RX ADMIN — CLOPIDOGREL BISULFATE 75 MG: 75 TABLET ORAL at 14:05

## 2020-11-12 RX ADMIN — METOPROLOL SUCCINATE 75 MG: 50 TABLET, EXTENDED RELEASE ORAL at 14:05

## 2020-11-12 RX ADMIN — ACETAMINOPHEN 650 MG: 325 TABLET, FILM COATED ORAL at 21:33

## 2020-11-12 RX ADMIN — PREDNISONE 50 MG: 20 TABLET ORAL at 14:05

## 2020-11-12 RX ADMIN — ESCITALOPRAM OXALATE 10 MG: 10 TABLET ORAL at 14:05

## 2020-11-12 RX ADMIN — ASPIRIN 81 MG CHEWABLE TABLET 81 MG: 81 TABLET CHEWABLE at 14:05

## 2020-11-12 RX ADMIN — ENOXAPARIN SODIUM 40 MG: 40 INJECTION SUBCUTANEOUS at 14:08

## 2020-11-13 ENCOUNTER — APPOINTMENT (OUTPATIENT)
Dept: INTERVENTIONAL RADIOLOGY/VASCULAR | Facility: HOSPITAL | Age: 60
DRG: 287 | End: 2020-11-13
Payer: COMMERCIAL

## 2020-11-13 ENCOUNTER — APPOINTMENT (OUTPATIENT)
Dept: NON INVASIVE DIAGNOSTICS | Facility: HOSPITAL | Age: 60
DRG: 287 | End: 2020-11-13
Payer: COMMERCIAL

## 2020-11-13 LAB
ANION GAP SERPL CALCULATED.3IONS-SCNC: 8 MMOL/L (ref 4–13)
APTT PPP: 47 SECONDS (ref 23–37)
APTT PPP: 49 SECONDS (ref 23–37)
BUN SERPL-MCNC: 22 MG/DL (ref 5–25)
CALCIUM SERPL-MCNC: 9.2 MG/DL (ref 8.3–10.1)
CHLORIDE SERPL-SCNC: 105 MMOL/L (ref 100–108)
CO2 SERPL-SCNC: 25 MMOL/L (ref 21–32)
CREAT SERPL-MCNC: 0.93 MG/DL (ref 0.6–1.3)
ERYTHROCYTE [DISTWIDTH] IN BLOOD BY AUTOMATED COUNT: 13.1 % (ref 11.6–15.1)
GFR SERPL CREATININE-BSD FRML MDRD: 90 ML/MIN/1.73SQ M
GLUCOSE SERPL-MCNC: 113 MG/DL (ref 65–140)
HCT VFR BLD AUTO: 45.1 % (ref 36.5–49.3)
HGB BLD-MCNC: 14.9 G/DL (ref 12–17)
MCH RBC QN AUTO: 28.3 PG (ref 26.8–34.3)
MCHC RBC AUTO-ENTMCNC: 33 G/DL (ref 31.4–37.4)
MCV RBC AUTO: 86 FL (ref 82–98)
PLATELET # BLD AUTO: 219 THOUSANDS/UL (ref 149–390)
PMV BLD AUTO: 9.4 FL (ref 8.9–12.7)
POTASSIUM SERPL-SCNC: 4.2 MMOL/L (ref 3.5–5.3)
RBC # BLD AUTO: 5.26 MILLION/UL (ref 3.88–5.62)
SODIUM SERPL-SCNC: 138 MMOL/L (ref 136–145)
WBC # BLD AUTO: 7.36 THOUSAND/UL (ref 4.31–10.16)

## 2020-11-13 PROCEDURE — 93308 TTE F-UP OR LMTD: CPT

## 2020-11-13 PROCEDURE — 93459 L HRT ART/GRFT ANGIO: CPT | Performed by: PHYSICIAN ASSISTANT

## 2020-11-13 PROCEDURE — 99153 MOD SED SAME PHYS/QHP EA: CPT | Performed by: PHYSICIAN ASSISTANT

## 2020-11-13 PROCEDURE — 85347 COAGULATION TIME ACTIVATED: CPT

## 2020-11-13 PROCEDURE — 4A023N7 MEASUREMENT OF CARDIAC SAMPLING AND PRESSURE, LEFT HEART, PERCUTANEOUS APPROACH: ICD-10-PCS | Performed by: INTERNAL MEDICINE

## 2020-11-13 PROCEDURE — 99152 MOD SED SAME PHYS/QHP 5/>YRS: CPT | Performed by: INTERNAL MEDICINE

## 2020-11-13 PROCEDURE — B215YZZ FLUOROSCOPY OF LEFT HEART USING OTHER CONTRAST: ICD-10-PCS | Performed by: INTERNAL MEDICINE

## 2020-11-13 PROCEDURE — 93459 L HRT ART/GRFT ANGIO: CPT | Performed by: INTERNAL MEDICINE

## 2020-11-13 PROCEDURE — 85730 THROMBOPLASTIN TIME PARTIAL: CPT | Performed by: INTERNAL MEDICINE

## 2020-11-13 PROCEDURE — 99152 MOD SED SAME PHYS/QHP 5/>YRS: CPT | Performed by: PHYSICIAN ASSISTANT

## 2020-11-13 PROCEDURE — 93308 TTE F-UP OR LMTD: CPT | Performed by: INTERNAL MEDICINE

## 2020-11-13 PROCEDURE — 93325 DOPPLER ECHO COLOR FLOW MAPG: CPT | Performed by: INTERNAL MEDICINE

## 2020-11-13 PROCEDURE — B212YZZ FLUOROSCOPY OF SINGLE CORONARY ARTERY BYPASS GRAFT USING OTHER CONTRAST: ICD-10-PCS | Performed by: INTERNAL MEDICINE

## 2020-11-13 PROCEDURE — 99233 SBSQ HOSP IP/OBS HIGH 50: CPT | Performed by: INTERNAL MEDICINE

## 2020-11-13 PROCEDURE — 93321 DOPPLER ECHO F-UP/LMTD STD: CPT | Performed by: INTERNAL MEDICINE

## 2020-11-13 PROCEDURE — C1769 GUIDE WIRE: HCPCS | Performed by: PHYSICIAN ASSISTANT

## 2020-11-13 PROCEDURE — B211YZZ FLUOROSCOPY OF MULTIPLE CORONARY ARTERIES USING OTHER CONTRAST: ICD-10-PCS | Performed by: INTERNAL MEDICINE

## 2020-11-13 PROCEDURE — C1894 INTRO/SHEATH, NON-LASER: HCPCS | Performed by: PHYSICIAN ASSISTANT

## 2020-11-13 PROCEDURE — 85027 COMPLETE CBC AUTOMATED: CPT | Performed by: INTERNAL MEDICINE

## 2020-11-13 PROCEDURE — 80048 BASIC METABOLIC PNL TOTAL CA: CPT | Performed by: PHYSICIAN ASSISTANT

## 2020-11-13 RX ORDER — FENTANYL CITRATE 50 UG/ML
INJECTION, SOLUTION INTRAMUSCULAR; INTRAVENOUS CODE/TRAUMA/SEDATION MEDICATION
Status: COMPLETED | OUTPATIENT
Start: 2020-11-13 | End: 2020-11-13

## 2020-11-13 RX ORDER — MIDAZOLAM HYDROCHLORIDE 2 MG/2ML
INJECTION, SOLUTION INTRAMUSCULAR; INTRAVENOUS CODE/TRAUMA/SEDATION MEDICATION
Status: COMPLETED | OUTPATIENT
Start: 2020-11-13 | End: 2020-11-13

## 2020-11-13 RX ORDER — ONDANSETRON 2 MG/ML
4 INJECTION INTRAMUSCULAR; INTRAVENOUS EVERY 6 HOURS PRN
Status: DISCONTINUED | OUTPATIENT
Start: 2020-11-13 | End: 2020-11-14 | Stop reason: HOSPADM

## 2020-11-13 RX ORDER — HEPARIN SODIUM 1000 [USP'U]/ML
INJECTION, SOLUTION INTRAVENOUS; SUBCUTANEOUS CODE/TRAUMA/SEDATION MEDICATION
Status: COMPLETED | OUTPATIENT
Start: 2020-11-13 | End: 2020-11-13

## 2020-11-13 RX ORDER — LIDOCAINE WITH 8.4% SOD BICARB 0.9%(10ML)
SYRINGE (ML) INJECTION CODE/TRAUMA/SEDATION MEDICATION
Status: COMPLETED | OUTPATIENT
Start: 2020-11-13 | End: 2020-11-13

## 2020-11-13 RX ORDER — DIPHENHYDRAMINE HYDROCHLORIDE 50 MG/ML
INJECTION INTRAMUSCULAR; INTRAVENOUS CODE/TRAUMA/SEDATION MEDICATION
Status: COMPLETED | OUTPATIENT
Start: 2020-11-13 | End: 2020-11-13

## 2020-11-13 RX ORDER — NITROGLYCERIN 20 MG/100ML
INJECTION INTRAVENOUS CODE/TRAUMA/SEDATION MEDICATION
Status: COMPLETED | OUTPATIENT
Start: 2020-11-13 | End: 2020-11-13

## 2020-11-13 RX ORDER — SODIUM CHLORIDE 9 MG/ML
75 INJECTION, SOLUTION INTRAVENOUS CONTINUOUS
Status: DISPENSED | OUTPATIENT
Start: 2020-11-13 | End: 2020-11-13

## 2020-11-13 RX ORDER — ISOSORBIDE MONONITRATE 60 MG/1
60 TABLET, EXTENDED RELEASE ORAL DAILY
Status: DISCONTINUED | OUTPATIENT
Start: 2020-11-14 | End: 2020-11-14 | Stop reason: HOSPADM

## 2020-11-13 RX ORDER — VERAPAMIL HCL 2.5 MG/ML
AMPUL (ML) INTRAVENOUS CODE/TRAUMA/SEDATION MEDICATION
Status: COMPLETED | OUTPATIENT
Start: 2020-11-13 | End: 2020-11-13

## 2020-11-13 RX ADMIN — CLOPIDOGREL BISULFATE 75 MG: 75 TABLET ORAL at 08:53

## 2020-11-13 RX ADMIN — DIPHENHYDRAMINE HYDROCHLORIDE 50 MG: 50 INJECTION, SOLUTION INTRAMUSCULAR; INTRAVENOUS at 11:20

## 2020-11-13 RX ADMIN — IODIXANOL 50 ML: 320 INJECTION, SOLUTION INTRAVASCULAR at 11:56

## 2020-11-13 RX ADMIN — FENTANYL CITRATE 50 MCG: 50 INJECTION, SOLUTION INTRAMUSCULAR; INTRAVENOUS at 11:24

## 2020-11-13 RX ADMIN — ESCITALOPRAM OXALATE 10 MG: 10 TABLET ORAL at 08:53

## 2020-11-13 RX ADMIN — FAMOTIDINE 20 MG: 20 TABLET ORAL at 08:53

## 2020-11-13 RX ADMIN — FAMOTIDINE 20 MG: 20 TABLET ORAL at 16:15

## 2020-11-13 RX ADMIN — NITROGLYCERIN 200 MCG: 20 INJECTION INTRAVENOUS at 11:28

## 2020-11-13 RX ADMIN — PREDNISONE 50 MG: 20 TABLET ORAL at 04:03

## 2020-11-13 RX ADMIN — ASPIRIN 81 MG CHEWABLE TABLET 81 MG: 81 TABLET CHEWABLE at 08:52

## 2020-11-13 RX ADMIN — MIDAZOLAM HYDROCHLORIDE 1 MG: 1 INJECTION, SOLUTION INTRAMUSCULAR; INTRAVENOUS at 11:30

## 2020-11-13 RX ADMIN — VERAPAMIL HYDROCHLORIDE 2.5 MG: 2.5 INJECTION, SOLUTION INTRAVENOUS at 11:28

## 2020-11-13 RX ADMIN — HYDROCORTISONE SODIUM SUCCINATE 100 MG: 100 INJECTION, POWDER, FOR SOLUTION INTRAMUSCULAR; INTRAVENOUS at 11:20

## 2020-11-13 RX ADMIN — ACETAMINOPHEN 650 MG: 325 TABLET, FILM COATED ORAL at 15:21

## 2020-11-13 RX ADMIN — HEPARIN SODIUM 3000 UNITS: 1000 INJECTION INTRAVENOUS; SUBCUTANEOUS at 11:33

## 2020-11-13 RX ADMIN — SODIUM CHLORIDE 75 ML/HR: 0.9 INJECTION, SOLUTION INTRAVENOUS at 13:27

## 2020-11-13 RX ADMIN — Medication 2 ML: at 11:26

## 2020-11-13 RX ADMIN — MIDAZOLAM HYDROCHLORIDE 1 MG: 1 INJECTION, SOLUTION INTRAMUSCULAR; INTRAVENOUS at 11:24

## 2020-11-13 RX ADMIN — PRAVASTATIN SODIUM 40 MG: 40 TABLET ORAL at 21:46

## 2020-11-13 RX ADMIN — ISOSORBIDE MONONITRATE 30 MG: 30 TABLET, EXTENDED RELEASE ORAL at 08:53

## 2020-11-14 VITALS
DIASTOLIC BLOOD PRESSURE: 77 MMHG | BODY MASS INDEX: 29.92 KG/M2 | OXYGEN SATURATION: 97 % | RESPIRATION RATE: 18 BRPM | SYSTOLIC BLOOD PRESSURE: 129 MMHG | WEIGHT: 186.2 LBS | HEART RATE: 51 BPM | TEMPERATURE: 98 F | HEIGHT: 66 IN

## 2020-11-14 LAB
ANION GAP SERPL CALCULATED.3IONS-SCNC: 8 MMOL/L (ref 4–13)
BASOPHILS # BLD AUTO: 0.03 THOUSANDS/ΜL (ref 0–0.1)
BASOPHILS NFR BLD AUTO: 0 % (ref 0–1)
BUN SERPL-MCNC: 19 MG/DL (ref 5–25)
CALCIUM SERPL-MCNC: 9.4 MG/DL (ref 8.3–10.1)
CHLORIDE SERPL-SCNC: 105 MMOL/L (ref 100–108)
CO2 SERPL-SCNC: 28 MMOL/L (ref 21–32)
CREAT SERPL-MCNC: 1.01 MG/DL (ref 0.6–1.3)
EOSINOPHIL # BLD AUTO: 0.05 THOUSAND/ΜL (ref 0–0.61)
EOSINOPHIL NFR BLD AUTO: 1 % (ref 0–6)
ERYTHROCYTE [DISTWIDTH] IN BLOOD BY AUTOMATED COUNT: 13.1 % (ref 11.6–15.1)
GFR SERPL CREATININE-BSD FRML MDRD: 81 ML/MIN/1.73SQ M
GLUCOSE SERPL-MCNC: 108 MG/DL (ref 65–140)
HCT VFR BLD AUTO: 41.6 % (ref 36.5–49.3)
HGB BLD-MCNC: 14 G/DL (ref 12–17)
IMM GRANULOCYTES # BLD AUTO: 0.04 THOUSAND/UL (ref 0–0.2)
IMM GRANULOCYTES NFR BLD AUTO: 0 % (ref 0–2)
LYMPHOCYTES # BLD AUTO: 2.33 THOUSANDS/ΜL (ref 0.6–4.47)
LYMPHOCYTES NFR BLD AUTO: 26 % (ref 14–44)
MCH RBC QN AUTO: 28.5 PG (ref 26.8–34.3)
MCHC RBC AUTO-ENTMCNC: 33.7 G/DL (ref 31.4–37.4)
MCV RBC AUTO: 85 FL (ref 82–98)
MONOCYTES # BLD AUTO: 0.8 THOUSAND/ΜL (ref 0.17–1.22)
MONOCYTES NFR BLD AUTO: 9 % (ref 4–12)
NEUTROPHILS # BLD AUTO: 5.78 THOUSANDS/ΜL (ref 1.85–7.62)
NEUTS SEG NFR BLD AUTO: 64 % (ref 43–75)
NRBC BLD AUTO-RTO: 0 /100 WBCS
PLATELET # BLD AUTO: 235 THOUSANDS/UL (ref 149–390)
PMV BLD AUTO: 9.3 FL (ref 8.9–12.7)
POTASSIUM SERPL-SCNC: 3.8 MMOL/L (ref 3.5–5.3)
RBC # BLD AUTO: 4.91 MILLION/UL (ref 3.88–5.62)
SODIUM SERPL-SCNC: 141 MMOL/L (ref 136–145)
WBC # BLD AUTO: 9.03 THOUSAND/UL (ref 4.31–10.16)

## 2020-11-14 PROCEDURE — 99232 SBSQ HOSP IP/OBS MODERATE 35: CPT | Performed by: INTERNAL MEDICINE

## 2020-11-14 PROCEDURE — 85025 COMPLETE CBC W/AUTO DIFF WBC: CPT | Performed by: INTERNAL MEDICINE

## 2020-11-14 PROCEDURE — 99239 HOSP IP/OBS DSCHRG MGMT >30: CPT | Performed by: INTERNAL MEDICINE

## 2020-11-14 PROCEDURE — 80048 BASIC METABOLIC PNL TOTAL CA: CPT | Performed by: INTERNAL MEDICINE

## 2020-11-14 RX ADMIN — FAMOTIDINE 20 MG: 20 TABLET ORAL at 09:05

## 2020-11-14 RX ADMIN — ACETAMINOPHEN 650 MG: 325 TABLET, FILM COATED ORAL at 09:05

## 2020-11-14 RX ADMIN — ESCITALOPRAM OXALATE 10 MG: 10 TABLET ORAL at 09:01

## 2020-11-14 RX ADMIN — ACETAMINOPHEN 650 MG: 325 TABLET, FILM COATED ORAL at 00:16

## 2020-11-14 RX ADMIN — ISOSORBIDE MONONITRATE 60 MG: 60 TABLET, EXTENDED RELEASE ORAL at 09:02

## 2020-11-14 RX ADMIN — METOPROLOL SUCCINATE 75 MG: 50 TABLET, EXTENDED RELEASE ORAL at 09:02

## 2020-11-14 RX ADMIN — CLOPIDOGREL BISULFATE 75 MG: 75 TABLET ORAL at 09:02

## 2020-11-14 RX ADMIN — ASPIRIN 81 MG CHEWABLE TABLET 81 MG: 81 TABLET CHEWABLE at 09:01

## 2020-11-15 LAB
ATRIAL RATE: 49 BPM
ATRIAL RATE: 52 BPM
P AXIS: 8 DEGREES
P AXIS: 8 DEGREES
PR INTERVAL: 190 MS
PR INTERVAL: 190 MS
QRS AXIS: -53 DEGREES
QRS AXIS: -54 DEGREES
QRSD INTERVAL: 96 MS
QRSD INTERVAL: 96 MS
QT INTERVAL: 458 MS
QT INTERVAL: 458 MS
QTC INTERVAL: 413 MS
QTC INTERVAL: 425 MS
T WAVE AXIS: 101 DEGREES
T WAVE AXIS: 107 DEGREES
VENTRICULAR RATE: 49 BPM
VENTRICULAR RATE: 52 BPM

## 2020-11-15 PROCEDURE — 93010 ELECTROCARDIOGRAM REPORT: CPT | Performed by: INTERNAL MEDICINE

## 2020-11-16 ENCOUNTER — TRANSITIONAL CARE MANAGEMENT (OUTPATIENT)
Dept: FAMILY MEDICINE CLINIC | Facility: CLINIC | Age: 60
End: 2020-11-16

## 2020-11-16 LAB
KCT BLD-ACNC: 138 SEC (ref 89–137)
KCT BLD-ACNC: 178 SEC (ref 89–137)
SPECIMEN SOURCE: ABNORMAL
SPECIMEN SOURCE: ABNORMAL

## 2020-11-18 ENCOUNTER — OFFICE VISIT (OUTPATIENT)
Dept: FAMILY MEDICINE CLINIC | Facility: CLINIC | Age: 60
End: 2020-11-18
Payer: COMMERCIAL

## 2020-11-18 VITALS
HEIGHT: 66 IN | HEART RATE: 61 BPM | DIASTOLIC BLOOD PRESSURE: 68 MMHG | OXYGEN SATURATION: 98 % | SYSTOLIC BLOOD PRESSURE: 130 MMHG | WEIGHT: 188 LBS | BODY MASS INDEX: 30.22 KG/M2 | RESPIRATION RATE: 18 BRPM | TEMPERATURE: 97.6 F

## 2020-11-18 DIAGNOSIS — E78.2 MIXED HYPERLIPIDEMIA: ICD-10-CM

## 2020-11-18 DIAGNOSIS — I10 ESSENTIAL HYPERTENSION: ICD-10-CM

## 2020-11-18 DIAGNOSIS — I20.0 UNSTABLE ANGINA (HCC): Primary | ICD-10-CM

## 2020-11-18 PROCEDURE — 1111F DSCHRG MED/CURRENT MED MERGE: CPT | Performed by: PHYSICIAN ASSISTANT

## 2020-11-18 PROCEDURE — 99496 TRANSJ CARE MGMT HIGH F2F 7D: CPT | Performed by: PHYSICIAN ASSISTANT

## 2020-11-18 RX ORDER — NITROGLYCERIN 0.4 MG/1
0.4 TABLET SUBLINGUAL
Qty: 30 TABLET | Refills: 5 | Status: SHIPPED | OUTPATIENT
Start: 2020-11-18

## 2020-11-19 ENCOUNTER — TELEPHONE (OUTPATIENT)
Dept: CARDIOLOGY CLINIC | Facility: CLINIC | Age: 60
End: 2020-11-19

## 2020-12-01 ENCOUNTER — OFFICE VISIT (OUTPATIENT)
Dept: CARDIOLOGY CLINIC | Facility: CLINIC | Age: 60
End: 2020-12-01
Payer: COMMERCIAL

## 2020-12-01 VITALS
SYSTOLIC BLOOD PRESSURE: 140 MMHG | RESPIRATION RATE: 18 BRPM | DIASTOLIC BLOOD PRESSURE: 82 MMHG | TEMPERATURE: 97.8 F | HEIGHT: 66 IN | BODY MASS INDEX: 30.37 KG/M2 | WEIGHT: 189 LBS | OXYGEN SATURATION: 97 % | HEART RATE: 58 BPM

## 2020-12-01 DIAGNOSIS — E78.2 MIXED HYPERLIPIDEMIA: ICD-10-CM

## 2020-12-01 DIAGNOSIS — I10 ESSENTIAL HYPERTENSION: ICD-10-CM

## 2020-12-01 DIAGNOSIS — I25.119 ATHEROSCLEROSIS OF NATIVE CORONARY ARTERY OF NATIVE HEART WITH ANGINA PECTORIS (HCC): Primary | ICD-10-CM

## 2020-12-01 DIAGNOSIS — Z82.49 FAMILY HISTORY OF PREMATURE CORONARY ARTERY DISEASE: ICD-10-CM

## 2020-12-01 PROCEDURE — 1111F DSCHRG MED/CURRENT MED MERGE: CPT | Performed by: INTERNAL MEDICINE

## 2020-12-01 PROCEDURE — 3079F DIAST BP 80-89 MM HG: CPT | Performed by: INTERNAL MEDICINE

## 2020-12-01 PROCEDURE — 3077F SYST BP >= 140 MM HG: CPT | Performed by: INTERNAL MEDICINE

## 2020-12-01 PROCEDURE — 3008F BODY MASS INDEX DOCD: CPT | Performed by: INTERNAL MEDICINE

## 2020-12-01 PROCEDURE — 1036F TOBACCO NON-USER: CPT | Performed by: INTERNAL MEDICINE

## 2020-12-01 PROCEDURE — 99214 OFFICE O/P EST MOD 30 MIN: CPT | Performed by: INTERNAL MEDICINE

## 2020-12-01 RX ORDER — ISOSORBIDE MONONITRATE 30 MG/1
30 TABLET, EXTENDED RELEASE ORAL DAILY
Qty: 90 TABLET | Refills: 3 | Status: SHIPPED | OUTPATIENT
Start: 2020-12-01 | End: 2020-12-16

## 2020-12-01 RX ORDER — ISOSORBIDE MONONITRATE 60 MG/1
60 TABLET, EXTENDED RELEASE ORAL DAILY
Qty: 90 TABLET | Refills: 3 | Status: SHIPPED | OUTPATIENT
Start: 2020-12-01 | End: 2021-03-08 | Stop reason: SDUPTHER

## 2020-12-04 ENCOUNTER — TELEPHONE (OUTPATIENT)
Dept: CARDIOLOGY CLINIC | Facility: CLINIC | Age: 60
End: 2020-12-04

## 2020-12-07 DIAGNOSIS — Z91.041 ALLERGY TO IODINATED CONTRAST: Primary | ICD-10-CM

## 2020-12-07 DIAGNOSIS — I25.10 CORONARY ARTERY DISEASE INVOLVING NATIVE CORONARY ARTERY OF NATIVE HEART WITHOUT ANGINA PECTORIS: Primary | ICD-10-CM

## 2020-12-07 RX ORDER — PREDNISONE 50 MG/1
TABLET ORAL
Qty: 3 TABLET | Refills: 0 | OUTPATIENT
Start: 2020-12-07 | End: 2021-05-04

## 2020-12-09 ENCOUNTER — TELEPHONE (OUTPATIENT)
Dept: NON INVASIVE DIAGNOSTICS | Facility: HOSPITAL | Age: 60
End: 2020-12-09

## 2020-12-09 DIAGNOSIS — I25.119 ATHEROSCLEROSIS OF NATIVE CORONARY ARTERY OF NATIVE HEART WITH ANGINA PECTORIS (HCC): Primary | ICD-10-CM

## 2020-12-09 RX ORDER — SODIUM CHLORIDE 9 MG/ML
125 INJECTION, SOLUTION INTRAVENOUS CONTINUOUS
Status: CANCELLED | OUTPATIENT
Start: 2020-12-09

## 2020-12-09 NOTE — PRE-PROCEDURE INSTRUCTIONS
Pt given arrival time of 10am on 12/16 for cardiac cath  NPO after midnight, am meds with a sip of water  Pt has instructions for prep related to contrast allergy  Instructed to have a ride home after procedure

## 2020-12-15 ENCOUNTER — TELEPHONE (OUTPATIENT)
Dept: INTERVENTIONAL RADIOLOGY/VASCULAR | Facility: HOSPITAL | Age: 60
End: 2020-12-15

## 2020-12-16 ENCOUNTER — HOSPITAL ENCOUNTER (OUTPATIENT)
Dept: INTERVENTIONAL RADIOLOGY/VASCULAR | Facility: HOSPITAL | Age: 60
Discharge: HOME | End: 2020-12-16
Attending: INTERNAL MEDICINE | Admitting: INTERNAL MEDICINE
Payer: COMMERCIAL

## 2020-12-16 VITALS
DIASTOLIC BLOOD PRESSURE: 68 MMHG | RESPIRATION RATE: 17 BRPM | HEIGHT: 66 IN | WEIGHT: 190.26 LBS | BODY MASS INDEX: 30.58 KG/M2 | OXYGEN SATURATION: 98 % | HEART RATE: 65 BPM | SYSTOLIC BLOOD PRESSURE: 132 MMHG | TEMPERATURE: 98 F

## 2020-12-16 DIAGNOSIS — I25.119 ATHEROSCLEROSIS OF NATIVE CORONARY ARTERY OF NATIVE HEART WITH ANGINA PECTORIS (HCC): ICD-10-CM

## 2020-12-16 DIAGNOSIS — Z98.61 S/P CORONARY ANGIOPLASTY: Primary | ICD-10-CM

## 2020-12-16 LAB
ANION GAP SERPL CALCULATED.3IONS-SCNC: 12 MMOL/L (ref 4–13)
BUN SERPL-MCNC: 21 MG/DL (ref 5–25)
CALCIUM SERPL-MCNC: 9.2 MG/DL (ref 8.3–10.1)
CHLORIDE SERPL-SCNC: 102 MMOL/L (ref 100–108)
CO2 SERPL-SCNC: 24 MMOL/L (ref 21–32)
CREAT SERPL-MCNC: 0.86 MG/DL (ref 0.6–1.3)
GFR SERPL CREATININE-BSD FRML MDRD: 95 ML/MIN/1.73SQ M
GLUCOSE P FAST SERPL-MCNC: 204 MG/DL (ref 65–99)
GLUCOSE SERPL-MCNC: 204 MG/DL (ref 65–140)
KCT BLD-ACNC: 231 SEC (ref 89–137)
KCT BLD-ACNC: 293 SEC (ref 89–137)
POTASSIUM SERPL-SCNC: 4 MMOL/L (ref 3.5–5.3)
SODIUM SERPL-SCNC: 138 MMOL/L (ref 136–145)
SPECIMEN SOURCE: ABNORMAL
SPECIMEN SOURCE: ABNORMAL

## 2020-12-16 PROCEDURE — C1725 CATH, TRANSLUMIN NON-LASER: HCPCS

## 2020-12-16 PROCEDURE — C9600 PERC DRUG-EL COR STENT SING: HCPCS

## 2020-12-16 PROCEDURE — 92979 ENDOLUMINL IVUS OCT C EA: CPT

## 2020-12-16 PROCEDURE — C1769 GUIDE WIRE: HCPCS

## 2020-12-16 PROCEDURE — 99152 MOD SED SAME PHYS/QHP 5/>YRS: CPT | Performed by: INTERNAL MEDICINE

## 2020-12-16 PROCEDURE — C1887 CATHETER, GUIDING: HCPCS

## 2020-12-16 PROCEDURE — C1753 CATH, INTRAVAS ULTRASOUND: HCPCS

## 2020-12-16 PROCEDURE — 93454 CORONARY ARTERY ANGIO S&I: CPT

## 2020-12-16 PROCEDURE — 92978 ENDOLUMINL IVUS OCT C 1ST: CPT | Performed by: INTERNAL MEDICINE

## 2020-12-16 PROCEDURE — 92978 ENDOLUMINL IVUS OCT C 1ST: CPT

## 2020-12-16 PROCEDURE — 92928 PRQ TCAT PLMT NTRAC ST 1 LES: CPT | Performed by: INTERNAL MEDICINE

## 2020-12-16 PROCEDURE — 99153 MOD SED SAME PHYS/QHP EA: CPT

## 2020-12-16 PROCEDURE — 99152 MOD SED SAME PHYS/QHP 5/>YRS: CPT

## 2020-12-16 PROCEDURE — 85347 COAGULATION TIME ACTIVATED: CPT

## 2020-12-16 PROCEDURE — C9601 PERC DRUG-EL COR STENT BRAN: HCPCS

## 2020-12-16 PROCEDURE — C1874 STENT, COATED/COV W/DEL SYS: HCPCS

## 2020-12-16 PROCEDURE — C1894 INTRO/SHEATH, NON-LASER: HCPCS

## 2020-12-16 PROCEDURE — 80048 BASIC METABOLIC PNL TOTAL CA: CPT | Performed by: INTERNAL MEDICINE

## 2020-12-16 RX ORDER — FENTANYL CITRATE 50 UG/ML
INJECTION, SOLUTION INTRAMUSCULAR; INTRAVENOUS CODE/TRAUMA/SEDATION MEDICATION
Status: COMPLETED | OUTPATIENT
Start: 2020-12-16 | End: 2020-12-16

## 2020-12-16 RX ORDER — HEPARIN SODIUM 1000 [USP'U]/ML
INJECTION, SOLUTION INTRAVENOUS; SUBCUTANEOUS CODE/TRAUMA/SEDATION MEDICATION
Status: COMPLETED | OUTPATIENT
Start: 2020-12-16 | End: 2020-12-16

## 2020-12-16 RX ORDER — MIDAZOLAM HYDROCHLORIDE 2 MG/2ML
INJECTION, SOLUTION INTRAMUSCULAR; INTRAVENOUS CODE/TRAUMA/SEDATION MEDICATION
Status: COMPLETED | OUTPATIENT
Start: 2020-12-16 | End: 2020-12-16

## 2020-12-16 RX ORDER — CLOPIDOGREL BISULFATE 75 MG/1
TABLET ORAL CODE/TRAUMA/SEDATION MEDICATION
Status: COMPLETED | OUTPATIENT
Start: 2020-12-16 | End: 2020-12-16

## 2020-12-16 RX ORDER — ACETAMINOPHEN 325 MG/1
650 TABLET ORAL EVERY 8 HOURS PRN
Status: DISCONTINUED | OUTPATIENT
Start: 2020-12-16 | End: 2020-12-20 | Stop reason: HOSPADM

## 2020-12-16 RX ORDER — VERAPAMIL HCL 2.5 MG/ML
AMPUL (ML) INTRAVENOUS CODE/TRAUMA/SEDATION MEDICATION
Status: COMPLETED | OUTPATIENT
Start: 2020-12-16 | End: 2020-12-16

## 2020-12-16 RX ORDER — DIPHENHYDRAMINE HYDROCHLORIDE 50 MG/ML
INJECTION INTRAMUSCULAR; INTRAVENOUS CODE/TRAUMA/SEDATION MEDICATION
Status: COMPLETED | OUTPATIENT
Start: 2020-12-16 | End: 2020-12-16

## 2020-12-16 RX ORDER — LIDOCAINE WITH 8.4% SOD BICARB 0.9%(10ML)
SYRINGE (ML) INJECTION CODE/TRAUMA/SEDATION MEDICATION
Status: COMPLETED | OUTPATIENT
Start: 2020-12-16 | End: 2020-12-16

## 2020-12-16 RX ORDER — SODIUM CHLORIDE 9 MG/ML
100 INJECTION, SOLUTION INTRAVENOUS CONTINUOUS
Status: DISPENSED | OUTPATIENT
Start: 2020-12-16 | End: 2020-12-16

## 2020-12-16 RX ORDER — NITROGLYCERIN 20 MG/100ML
INJECTION INTRAVENOUS CODE/TRAUMA/SEDATION MEDICATION
Status: COMPLETED | OUTPATIENT
Start: 2020-12-16 | End: 2020-12-16

## 2020-12-16 RX ORDER — SODIUM CHLORIDE 9 MG/ML
125 INJECTION, SOLUTION INTRAVENOUS CONTINUOUS
Status: DISCONTINUED | OUTPATIENT
Start: 2020-12-16 | End: 2020-12-16

## 2020-12-16 RX ORDER — ASPIRIN 81 MG/1
TABLET, CHEWABLE ORAL CODE/TRAUMA/SEDATION MEDICATION
Status: COMPLETED | OUTPATIENT
Start: 2020-12-16 | End: 2020-12-16

## 2020-12-16 RX ORDER — ONDANSETRON 2 MG/ML
4 INJECTION INTRAMUSCULAR; INTRAVENOUS EVERY 6 HOURS PRN
Status: DISCONTINUED | OUTPATIENT
Start: 2020-12-16 | End: 2020-12-20 | Stop reason: HOSPADM

## 2020-12-16 RX ADMIN — MIDAZOLAM HYDROCHLORIDE 1 MG: 1 INJECTION, SOLUTION INTRAMUSCULAR; INTRAVENOUS at 10:39

## 2020-12-16 RX ADMIN — MIDAZOLAM HYDROCHLORIDE 1 MG: 1 INJECTION, SOLUTION INTRAMUSCULAR; INTRAVENOUS at 11:06

## 2020-12-16 RX ADMIN — DIPHENHYDRAMINE HYDROCHLORIDE 50 MG: 50 INJECTION, SOLUTION INTRAMUSCULAR; INTRAVENOUS at 10:38

## 2020-12-16 RX ADMIN — IODIXANOL 140 ML: 320 INJECTION, SOLUTION INTRAVASCULAR at 12:01

## 2020-12-16 RX ADMIN — Medication 2 ML: at 10:43

## 2020-12-16 RX ADMIN — VERAPAMIL HYDROCHLORIDE 2.5 MG: 2.5 INJECTION, SOLUTION INTRAVENOUS at 10:45

## 2020-12-16 RX ADMIN — HEPARIN SODIUM 2000 UNITS: 1000 INJECTION INTRAVENOUS; SUBCUTANEOUS at 11:40

## 2020-12-16 RX ADMIN — HEPARIN SODIUM 1000 UNITS: 1000 INJECTION INTRAVENOUS; SUBCUTANEOUS at 11:09

## 2020-12-16 RX ADMIN — ASPIRIN 81 MG 81 MG: 81 TABLET ORAL at 12:00

## 2020-12-16 RX ADMIN — SODIUM CHLORIDE 125 ML/HR: 0.9 INJECTION, SOLUTION INTRAVENOUS at 10:24

## 2020-12-16 RX ADMIN — NITROGLYCERIN 200 MCG: 20 INJECTION INTRAVENOUS at 10:45

## 2020-12-16 RX ADMIN — HEPARIN SODIUM 1000 UNITS: 1000 INJECTION INTRAVENOUS; SUBCUTANEOUS at 11:49

## 2020-12-16 RX ADMIN — HEPARIN SODIUM 8000 UNITS: 1000 INJECTION INTRAVENOUS; SUBCUTANEOUS at 10:47

## 2020-12-16 RX ADMIN — FENTANYL CITRATE 50 MCG: 50 INJECTION, SOLUTION INTRAMUSCULAR; INTRAVENOUS at 11:06

## 2020-12-16 RX ADMIN — HYDROCORTISONE SODIUM SUCCINATE 100 MG: 100 INJECTION, POWDER, FOR SOLUTION INTRAMUSCULAR; INTRAVENOUS at 10:43

## 2020-12-16 RX ADMIN — FENTANYL CITRATE 50 MCG: 50 INJECTION, SOLUTION INTRAMUSCULAR; INTRAVENOUS at 10:40

## 2020-12-16 RX ADMIN — CLOPIDOGREL 75 MG: 75 TABLET, FILM COATED ORAL at 12:01

## 2020-12-16 RX ADMIN — FENTANYL CITRATE 50 MCG: 50 INJECTION, SOLUTION INTRAMUSCULAR; INTRAVENOUS at 12:03

## 2020-12-16 RX ADMIN — FENTANYL CITRATE 50 MCG: 50 INJECTION, SOLUTION INTRAMUSCULAR; INTRAVENOUS at 11:40

## 2020-12-21 DIAGNOSIS — G47.00 INSOMNIA, UNSPECIFIED TYPE: ICD-10-CM

## 2020-12-21 RX ORDER — TRAZODONE HYDROCHLORIDE 50 MG/1
TABLET ORAL
Qty: 90 TABLET | Refills: 0 | Status: SHIPPED | OUTPATIENT
Start: 2020-12-21 | End: 2021-07-30

## 2020-12-23 DIAGNOSIS — E78.2 MIXED HYPERLIPIDEMIA: ICD-10-CM

## 2020-12-23 DIAGNOSIS — I25.10 CORONARY ARTERY DISEASE INVOLVING NATIVE HEART WITHOUT ANGINA PECTORIS, UNSPECIFIED VESSEL OR LESION TYPE: ICD-10-CM

## 2020-12-23 RX ORDER — PRAVASTATIN SODIUM 40 MG
40 TABLET ORAL
Qty: 90 TABLET | Refills: 1 | Status: SHIPPED | OUTPATIENT
Start: 2020-12-23 | End: 2021-06-14

## 2020-12-23 RX ORDER — METOPROLOL SUCCINATE 100 MG/1
100 TABLET, EXTENDED RELEASE ORAL DAILY
Qty: 90 TABLET | Refills: 1 | Status: SHIPPED | OUTPATIENT
Start: 2020-12-23 | End: 2021-06-14

## 2021-01-18 DIAGNOSIS — I25.10 CORONARY ARTERY DISEASE INVOLVING NATIVE CORONARY ARTERY OF NATIVE HEART WITHOUT ANGINA PECTORIS: ICD-10-CM

## 2021-01-18 RX ORDER — CLOPIDOGREL BISULFATE 75 MG/1
TABLET ORAL
Qty: 90 TABLET | Refills: 1 | Status: SHIPPED | OUTPATIENT
Start: 2021-01-18 | End: 2021-07-10

## 2021-03-08 ENCOUNTER — PATIENT MESSAGE (OUTPATIENT)
Dept: FAMILY MEDICINE CLINIC | Facility: CLINIC | Age: 61
End: 2021-03-08

## 2021-03-08 DIAGNOSIS — I25.119 ATHEROSCLEROSIS OF NATIVE CORONARY ARTERY OF NATIVE HEART WITH ANGINA PECTORIS (HCC): ICD-10-CM

## 2021-03-08 DIAGNOSIS — F41.9 ANXIETY: ICD-10-CM

## 2021-03-08 DIAGNOSIS — Z12.11 SCREEN FOR COLON CANCER: Primary | ICD-10-CM

## 2021-03-08 RX ORDER — LORAZEPAM 0.5 MG/1
0.5 TABLET ORAL 2 TIMES DAILY
Qty: 30 TABLET | Refills: 0 | Status: CANCELLED | OUTPATIENT
Start: 2021-03-08

## 2021-03-08 RX ORDER — LORAZEPAM 0.5 MG/1
0.5 TABLET ORAL 2 TIMES DAILY
Qty: 60 TABLET | Refills: 0 | Status: SHIPPED | OUTPATIENT
Start: 2021-03-08 | End: 2022-06-10 | Stop reason: SDUPTHER

## 2021-03-08 RX ORDER — ISOSORBIDE MONONITRATE 60 MG/1
90 TABLET, EXTENDED RELEASE ORAL DAILY
Qty: 135 TABLET | Refills: 1 | Status: SHIPPED | OUTPATIENT
Start: 2021-03-08 | End: 2021-11-23 | Stop reason: SDUPTHER

## 2021-03-08 NOTE — TELEPHONE ENCOUNTER
From: Michael Hernandez  To: Dorian Maxwell PA-C  Sent: 3/8/2021 1:55 PM EST  Subject: Non-Urgent Medical Question    How can I pick a date to get a colonoscopy on my heart if I even can

## 2021-03-08 NOTE — TELEPHONE ENCOUNTER
-Isosorbide Mononitrate 90 mg 24 tablet changed in hospital by surgeon in November when stents were put in  Needs refill  Ok to call patient with questions  Mailorder if possible

## 2021-03-10 DIAGNOSIS — Z23 ENCOUNTER FOR IMMUNIZATION: ICD-10-CM

## 2021-04-14 ENCOUNTER — TELEPHONE (OUTPATIENT)
Dept: GASTROENTEROLOGY | Facility: CLINIC | Age: 61
End: 2021-04-14

## 2021-04-14 NOTE — TELEPHONE ENCOUNTER
Valentín patient - Would like to direct schedule for a colonoscopy  Call [phone] to schedule   Referal is in,  Thxs

## 2021-04-20 DIAGNOSIS — F32.A DEPRESSION, UNSPECIFIED DEPRESSION TYPE: ICD-10-CM

## 2021-04-21 RX ORDER — ESCITALOPRAM OXALATE 10 MG/1
TABLET ORAL
Qty: 90 TABLET | Refills: 0 | Status: SHIPPED | OUTPATIENT
Start: 2021-04-21 | End: 2021-07-14

## 2021-05-03 ENCOUNTER — APPOINTMENT (EMERGENCY)
Dept: RADIOLOGY | Facility: HOSPITAL | Age: 61
End: 2021-05-03
Payer: COMMERCIAL

## 2021-05-03 ENCOUNTER — HOSPITAL ENCOUNTER (OUTPATIENT)
Facility: HOSPITAL | Age: 61
Setting detail: OBSERVATION
Discharge: HOME | End: 2021-05-04
Attending: EMERGENCY MEDICINE | Admitting: INTERNAL MEDICINE
Payer: COMMERCIAL

## 2021-05-03 DIAGNOSIS — R07.9 CHEST PAIN: Chronic | ICD-10-CM

## 2021-05-03 DIAGNOSIS — Z95.5 HISTORY OF CORONARY ARTERY STENT PLACEMENT: ICD-10-CM

## 2021-05-03 DIAGNOSIS — R07.9 CHEST PAIN, UNSPECIFIED TYPE: Primary | ICD-10-CM

## 2021-05-03 LAB
ALBUMIN SERPL BCP-MCNC: 3.9 G/DL (ref 3.5–5)
ALP SERPL-CCNC: 65 U/L (ref 46–116)
ALT SERPL W P-5'-P-CCNC: 32 U/L (ref 12–78)
ANION GAP SERPL CALCULATED.3IONS-SCNC: 6 MMOL/L (ref 4–13)
AST SERPL W P-5'-P-CCNC: 28 U/L (ref 5–45)
BASOPHILS # BLD AUTO: 0.05 THOUSANDS/ΜL (ref 0–0.1)
BASOPHILS NFR BLD AUTO: 1 % (ref 0–1)
BILIRUB SERPL-MCNC: 0.29 MG/DL (ref 0.2–1)
BUN SERPL-MCNC: 18 MG/DL (ref 5–25)
CALCIUM SERPL-MCNC: 8.5 MG/DL (ref 8.3–10.1)
CHLORIDE SERPL-SCNC: 105 MMOL/L (ref 100–108)
CO2 SERPL-SCNC: 26 MMOL/L (ref 21–32)
CREAT SERPL-MCNC: 0.84 MG/DL (ref 0.6–1.3)
EOSINOPHIL # BLD AUTO: 0.08 THOUSAND/ΜL (ref 0–0.61)
EOSINOPHIL NFR BLD AUTO: 2 % (ref 0–6)
ERYTHROCYTE [DISTWIDTH] IN BLOOD BY AUTOMATED COUNT: 13.2 % (ref 11.6–15.1)
GFR SERPL CREATININE-BSD FRML MDRD: 95 ML/MIN/1.73SQ M
GLUCOSE SERPL-MCNC: 119 MG/DL (ref 65–140)
HCT VFR BLD AUTO: 38.1 % (ref 36.5–49.3)
HGB BLD-MCNC: 12.9 G/DL (ref 12–17)
IMM GRANULOCYTES # BLD AUTO: 0.02 THOUSAND/UL (ref 0–0.2)
IMM GRANULOCYTES NFR BLD AUTO: 0 % (ref 0–2)
LYMPHOCYTES # BLD AUTO: 1.67 THOUSANDS/ΜL (ref 0.6–4.47)
LYMPHOCYTES NFR BLD AUTO: 32 % (ref 14–44)
MCH RBC QN AUTO: 28.9 PG (ref 26.8–34.3)
MCHC RBC AUTO-ENTMCNC: 33.9 G/DL (ref 31.4–37.4)
MCV RBC AUTO: 85 FL (ref 82–98)
MONOCYTES # BLD AUTO: 0.52 THOUSAND/ΜL (ref 0.17–1.22)
MONOCYTES NFR BLD AUTO: 10 % (ref 4–12)
NEUTROPHILS # BLD AUTO: 2.86 THOUSANDS/ΜL (ref 1.85–7.62)
NEUTS SEG NFR BLD AUTO: 55 % (ref 43–75)
NRBC BLD AUTO-RTO: 0 /100 WBCS
PLATELET # BLD AUTO: 206 THOUSANDS/UL (ref 149–390)
PMV BLD AUTO: 9.3 FL (ref 8.9–12.7)
POTASSIUM SERPL-SCNC: 3.6 MMOL/L (ref 3.5–5.3)
PROT SERPL-MCNC: 7 G/DL (ref 6.4–8.2)
RBC # BLD AUTO: 4.46 MILLION/UL (ref 3.88–5.62)
SODIUM SERPL-SCNC: 137 MMOL/L (ref 136–145)
TROPONIN I SERPL-MCNC: <0.02 NG/ML
WBC # BLD AUTO: 5.2 THOUSAND/UL (ref 4.31–10.16)

## 2021-05-03 PROCEDURE — 71045 X-RAY EXAM CHEST 1 VIEW: CPT

## 2021-05-03 PROCEDURE — 99285 EMERGENCY DEPT VISIT HI MDM: CPT | Performed by: EMERGENCY MEDICINE

## 2021-05-03 PROCEDURE — 84484 ASSAY OF TROPONIN QUANT: CPT | Performed by: EMERGENCY MEDICINE

## 2021-05-03 PROCEDURE — 85025 COMPLETE CBC W/AUTO DIFF WBC: CPT | Performed by: EMERGENCY MEDICINE

## 2021-05-03 PROCEDURE — 99285 EMERGENCY DEPT VISIT HI MDM: CPT

## 2021-05-03 PROCEDURE — 36415 COLL VENOUS BLD VENIPUNCTURE: CPT

## 2021-05-03 PROCEDURE — 93005 ELECTROCARDIOGRAM TRACING: CPT

## 2021-05-03 PROCEDURE — 80053 COMPREHEN METABOLIC PANEL: CPT | Performed by: EMERGENCY MEDICINE

## 2021-05-03 RX ORDER — ASPIRIN 81 MG/1
324 TABLET, CHEWABLE ORAL ONCE
Status: COMPLETED | OUTPATIENT
Start: 2021-05-03 | End: 2021-05-03

## 2021-05-03 RX ADMIN — ASPIRIN 324 MG: 81 TABLET, CHEWABLE ORAL at 23:05

## 2021-05-04 ENCOUNTER — APPOINTMENT (OUTPATIENT)
Dept: NON INVASIVE DIAGNOSTICS | Facility: HOSPITAL | Age: 61
End: 2021-05-04
Payer: COMMERCIAL

## 2021-05-04 VITALS
TEMPERATURE: 98.5 F | DIASTOLIC BLOOD PRESSURE: 74 MMHG | HEART RATE: 64 BPM | RESPIRATION RATE: 18 BRPM | OXYGEN SATURATION: 98 % | SYSTOLIC BLOOD PRESSURE: 135 MMHG

## 2021-05-04 PROBLEM — Z95.5 HISTORY OF CORONARY ARTERY STENT PLACEMENT: Status: ACTIVE | Noted: 2021-05-04

## 2021-05-04 LAB
ANION GAP SERPL CALCULATED.3IONS-SCNC: 6 MMOL/L (ref 4–13)
ATRIAL RATE: 58 BPM
BUN SERPL-MCNC: 17 MG/DL (ref 5–25)
CALCIUM SERPL-MCNC: 8.6 MG/DL (ref 8.3–10.1)
CHLORIDE SERPL-SCNC: 107 MMOL/L (ref 100–108)
CO2 SERPL-SCNC: 27 MMOL/L (ref 21–32)
CREAT SERPL-MCNC: 0.74 MG/DL (ref 0.6–1.3)
GFR SERPL CREATININE-BSD FRML MDRD: 100 ML/MIN/1.73SQ M
GLUCOSE SERPL-MCNC: 121 MG/DL (ref 65–140)
MAGNESIUM SERPL-MCNC: 1.7 MG/DL (ref 1.6–2.6)
P AXIS: 19 DEGREES
POTASSIUM SERPL-SCNC: 3.8 MMOL/L (ref 3.5–5.3)
PR INTERVAL: 170 MS
QRS AXIS: -87 DEGREES
QRSD INTERVAL: 96 MS
QT INTERVAL: 416 MS
QTC INTERVAL: 408 MS
SODIUM SERPL-SCNC: 140 MMOL/L (ref 136–145)
T WAVE AXIS: 121 DEGREES
TROPONIN I SERPL-MCNC: <0.02 NG/ML
TROPONIN I SERPL-MCNC: <0.02 NG/ML
VENTRICULAR RATE: 58 BPM

## 2021-05-04 PROCEDURE — 84484 ASSAY OF TROPONIN QUANT: CPT | Performed by: EMERGENCY MEDICINE

## 2021-05-04 PROCEDURE — 80048 BASIC METABOLIC PNL TOTAL CA: CPT | Performed by: PHYSICIAN ASSISTANT

## 2021-05-04 PROCEDURE — 93308 TTE F-UP OR LMTD: CPT | Performed by: INTERNAL MEDICINE

## 2021-05-04 PROCEDURE — 99236 HOSP IP/OBS SAME DATE HI 85: CPT | Performed by: FAMILY MEDICINE

## 2021-05-04 PROCEDURE — 93321 DOPPLER ECHO F-UP/LMTD STD: CPT | Performed by: INTERNAL MEDICINE

## 2021-05-04 PROCEDURE — 99244 OFF/OP CNSLTJ NEW/EST MOD 40: CPT | Performed by: INTERNAL MEDICINE

## 2021-05-04 PROCEDURE — 36415 COLL VENOUS BLD VENIPUNCTURE: CPT | Performed by: EMERGENCY MEDICINE

## 2021-05-04 PROCEDURE — 83735 ASSAY OF MAGNESIUM: CPT | Performed by: PHYSICIAN ASSISTANT

## 2021-05-04 PROCEDURE — 93308 TTE F-UP OR LMTD: CPT

## 2021-05-04 PROCEDURE — 93325 DOPPLER ECHO COLOR FLOW MAPG: CPT | Performed by: INTERNAL MEDICINE

## 2021-05-04 PROCEDURE — 93010 ELECTROCARDIOGRAM REPORT: CPT | Performed by: INTERNAL MEDICINE

## 2021-05-04 RX ORDER — PRAVASTATIN SODIUM 40 MG
40 TABLET ORAL
Status: DISCONTINUED | OUTPATIENT
Start: 2021-05-04 | End: 2021-05-04 | Stop reason: HOSPADM

## 2021-05-04 RX ORDER — TRAZODONE HYDROCHLORIDE 50 MG/1
25 TABLET ORAL ONCE
Status: COMPLETED | OUTPATIENT
Start: 2021-05-04 | End: 2021-05-04

## 2021-05-04 RX ORDER — CHLORAL HYDRATE 500 MG
1000 CAPSULE ORAL DAILY
Status: DISCONTINUED | OUTPATIENT
Start: 2021-05-04 | End: 2021-05-04 | Stop reason: HOSPADM

## 2021-05-04 RX ORDER — ESCITALOPRAM OXALATE 10 MG/1
10 TABLET ORAL DAILY
Status: DISCONTINUED | OUTPATIENT
Start: 2021-05-04 | End: 2021-05-04 | Stop reason: HOSPADM

## 2021-05-04 RX ORDER — CLOPIDOGREL BISULFATE 75 MG/1
75 TABLET ORAL DAILY
Status: DISCONTINUED | OUTPATIENT
Start: 2021-05-05 | End: 2021-05-04 | Stop reason: HOSPADM

## 2021-05-04 RX ORDER — ACETAMINOPHEN 325 MG/1
975 TABLET ORAL EVERY 6 HOURS PRN
Status: DISCONTINUED | OUTPATIENT
Start: 2021-05-04 | End: 2021-05-04 | Stop reason: HOSPADM

## 2021-05-04 RX ORDER — TRAZODONE HYDROCHLORIDE 50 MG/1
50 TABLET ORAL
Status: DISCONTINUED | OUTPATIENT
Start: 2021-05-04 | End: 2021-05-04 | Stop reason: HOSPADM

## 2021-05-04 RX ORDER — ONDANSETRON 2 MG/ML
4 INJECTION INTRAMUSCULAR; INTRAVENOUS EVERY 6 HOURS PRN
Status: DISCONTINUED | OUTPATIENT
Start: 2021-05-04 | End: 2021-05-04 | Stop reason: HOSPADM

## 2021-05-04 RX ORDER — MAGNESIUM HYDROXIDE/ALUMINUM HYDROXICE/SIMETHICONE 120; 1200; 1200 MG/30ML; MG/30ML; MG/30ML
30 SUSPENSION ORAL EVERY 6 HOURS PRN
Status: DISCONTINUED | OUTPATIENT
Start: 2021-05-04 | End: 2021-05-04 | Stop reason: HOSPADM

## 2021-05-04 RX ORDER — FENOFIBRATE 145 MG/1
145 TABLET, COATED ORAL DAILY
Status: DISCONTINUED | OUTPATIENT
Start: 2021-05-04 | End: 2021-05-04 | Stop reason: HOSPADM

## 2021-05-04 RX ORDER — ISOSORBIDE MONONITRATE 60 MG/1
60 TABLET, EXTENDED RELEASE ORAL DAILY
Qty: 30 TABLET | Refills: 0 | Status: SHIPPED | OUTPATIENT
Start: 2021-05-05 | End: 2021-06-09

## 2021-05-04 RX ORDER — LORAZEPAM 0.5 MG/1
0.5 TABLET ORAL 2 TIMES DAILY PRN
Status: DISCONTINUED | OUTPATIENT
Start: 2021-05-04 | End: 2021-05-04 | Stop reason: HOSPADM

## 2021-05-04 RX ORDER — CLOPIDOGREL BISULFATE 75 MG/1
75 TABLET ORAL DAILY
Status: DISCONTINUED | OUTPATIENT
Start: 2021-05-04 | End: 2021-05-04

## 2021-05-04 RX ORDER — ASPIRIN 81 MG/1
81 TABLET, CHEWABLE ORAL DAILY
Status: DISCONTINUED | OUTPATIENT
Start: 2021-05-04 | End: 2021-05-04 | Stop reason: HOSPADM

## 2021-05-04 RX ORDER — METOPROLOL SUCCINATE 50 MG/1
100 TABLET, EXTENDED RELEASE ORAL DAILY
Status: DISCONTINUED | OUTPATIENT
Start: 2021-05-04 | End: 2021-05-04 | Stop reason: HOSPADM

## 2021-05-04 RX ORDER — CLOPIDOGREL BISULFATE 75 MG/1
300 TABLET ORAL ONCE
Status: COMPLETED | OUTPATIENT
Start: 2021-05-04 | End: 2021-05-04

## 2021-05-04 RX ORDER — CHOLECALCIFEROL (VITAMIN D3) 125 MCG
200 CAPSULE ORAL DAILY
Status: DISCONTINUED | OUTPATIENT
Start: 2021-05-04 | End: 2021-05-04 | Stop reason: HOSPADM

## 2021-05-04 RX ORDER — ISOSORBIDE MONONITRATE 60 MG/1
60 TABLET, EXTENDED RELEASE ORAL DAILY
Status: DISCONTINUED | OUTPATIENT
Start: 2021-05-04 | End: 2021-05-04 | Stop reason: HOSPADM

## 2021-05-04 RX ADMIN — CLOPIDOGREL BISULFATE 300 MG: 75 TABLET ORAL at 08:54

## 2021-05-04 RX ADMIN — ESCITALOPRAM OXALATE 10 MG: 10 TABLET ORAL at 08:55

## 2021-05-04 RX ADMIN — OMEGA-3 FATTY ACIDS CAP 1000 MG 1000 MG: 1000 CAP at 08:55

## 2021-05-04 RX ADMIN — TRAZODONE HYDROCHLORIDE 25 MG: 50 TABLET ORAL at 05:05

## 2021-05-04 RX ADMIN — ASPIRIN 81 MG: 81 TABLET, CHEWABLE ORAL at 08:54

## 2021-05-04 RX ADMIN — METOPROLOL SUCCINATE 100 MG: 50 TABLET, EXTENDED RELEASE ORAL at 08:54

## 2021-05-04 RX ADMIN — FENOFIBRATE 145 MG: 145 TABLET, FILM COATED ORAL at 08:55

## 2021-05-04 RX ADMIN — Medication 200 MG: at 08:55

## 2021-05-04 RX ADMIN — ISOSORBIDE MONONITRATE 60 MG: 60 TABLET, EXTENDED RELEASE ORAL at 08:55

## 2021-05-04 NOTE — ASSESSMENT & PLAN NOTE
· Troponin (-) x 3    · 12 lead EKG showed sinus rhythm, normal axis, T-wave inversion in lateral leads present on previous EKG  · 2D echocardiogram notes ejection fraction 55-60%/no wall motion abnormalities  · Continue home dose ASA, Plavix, Imdur, Toprol, Tricor, omega 3 and Pravachol  · Discussed with Cardiology, recommendation for discharge in outpatient follow-up  · Currently patient is chest pain-free

## 2021-05-04 NOTE — ED PROVIDER NOTES
Pt Name: Eduardo Díaz  MRN: [id number]  [place] 1960  Age/Sex: 62 y o  male  Date of evaluation: 5/3/2021  PCP: Anant Wagner PA-C    CHIEF COMPLAINT    Chief Complaint   Patient presents with    Chest Pain     sharp pinching CP under left breast since yesterday  pain radiates to jaw  hx of CABG and stent placement in 11/2020  denies pain at this time  HPI    62 y o  male presenting with chest pain  Patient has past medical history of coronary disease status post CABG 11 years ago and stents placed November of last year  States yesterday while hiking he had exertional chest pain on the left side of chest, tightness in his jaw and left arm, associated shortness of breath, nausea and diaphoresis  Felt like when he had his stents placed  His been on and off since then, currently pain is resolved  No fevers or chills, no cough            Past Medical and Surgical History    Past Medical History:   Diagnosis Date    Anxiety     Coronary artery disease     Depression     Hyperlipidemia     Nephrolithiasis     Rotator cuff tear     Sleep apnea     Subdural hemorrhage-concussion ([address] )        Past Surgical History:   Procedure Laterality Date    BRAIN SURGERY      CARDIAC CATHETERIZATION      CORONARY ARTERY BYPASS GRAFT      HERNIA REPAIR      INGUINAL HERNIA REPAIR      OTHER SURGICAL HISTORY      cardiac cath procedure outcome - successful     SHOULDER SURGERY      arthroscopy     TONSILLECTOMY      VASECTOMY         Family History   Problem Relation Age of Onset    Diabetes Mother         mellitus     Depression Mother     Stroke Father     Other Father         other lymphatic and hematopoietic neoplasms     Hypertension Family     Cancer Family     Arthritis Family        Social History     Tobacco Use    Smoking status: Former Smoker     Types: Cigarettes     Quit date: 2011     Years since quitting: 10 3    Smokeless tobacco: Never Used    Tobacco comment: quit about 8 1/2 years ago    Substance Use Topics    Alcohol use: Yes     Comment: social     Drug use: Yes     Types: Marijuana     Comment: Few times a week           Allergies    Allergies   Allergen Reactions    Iodinated Diagnostic Agents     Meloxicam     Omnipaque [Iohexol]      NEEDS BENADRYL BEFORE PROCEDURE       Home Medications    Prior to Admission medications    Medication Sig Start Date End Date Taking?  Authorizing Provider   Ascorbic Acid (VITAMIN C PO) Take by mouth    Historical Provider, MD   aspirin 81 mg chewable tablet Chew 81 mg daily    Historical Provider, MD   B Complex-C (B-COMPLEX WITH VITAMIN C) tablet Take 1 tablet by mouth daily    Historical Provider, MD   clopidogrel (PLAVIX) 75 mg tablet TAKE 1 TABLET DAILY 1/18/21   Bernice Polanco PA-C   Coenzyme Q10 200 MG capsule Take 200 mg by mouth daily    Historical Provider, MD   escitalopram (LEXAPRO) 10 mg tablet TAKE 1 TABLET DAILY 4/21/21   Bessie Reagan MD   fenofibrate (TRICOR) 145 mg tablet TAKE 1 TABLET DAILY 5/19/20   Marvin Galvez MD   isosorbide mononitrate (IMDUR) 60 mg 24 hr tablet Take 1 5 tablets (90 mg total) by mouth daily 3/8/21   Bernice Polanco PA-C   LORazepam (ATIVAN) 0 5 mg tablet Take 1 tablet (0 5 mg total) by mouth 2 (two) times a day 3/8/21   Bernice Polanco PA-C   metoprolol succinate (TOPROL-XL) 100 mg 24 hr tablet Take 1 tablet (100 mg total) by mouth daily 12/23/20   Bernice Polanco PA-C   nitroglycerin (NITROSTAT) 0 4 mg SL tablet Place 1 tablet (0 4 mg total) under the tongue every 5 (five) minutes as needed for chest pain 11/18/20   Bernice Polanco PA-C   Omega-3 Fatty Acids (FISH OIL) 645 MG CAPS Take by mouth Daily    Historical Provider, MD   pravastatin (PRAVACHOL) 40 mg tablet Take 1 tablet (40 mg total) by mouth daily at bedtime 12/23/20   Bernice Polanco PA-C   predniSONE 50 mg tablet Take 1 dose by mouth 50 mg 13 hours, 7 hours and 1 hour prior to catheterization 12/7/20   [facility] Latosha Alvarez MD   traZODone (DESYREL) 50 mg tablet TAKE 1/2 TABLET AT BEDTIME  INCREASE TO 1 TABLET ONCE DAILY AT BED AS NEEDED  12/21/20   Kathryn Guzman PA-C           Review of Systems    Review of Systems   Constitutional: Positive for diaphoresis  Negative for chills and fever  HENT: Negative for rhinorrhea and sore throat  Eyes: Negative for pain and visual disturbance  Respiratory: Positive for shortness of breath  Negative for cough  Cardiovascular: Positive for chest pain  Negative for leg swelling  Gastrointestinal: Positive for nausea  Negative for abdominal pain and vomiting  Genitourinary: Negative for dysuria and hematuria  Musculoskeletal: Negative for back pain and myalgias  Skin: Negative for rash and wound  Neurological: Negative for syncope and headaches  Physical Exam      ED Triage Vitals   Temperature Pulse Respirations Blood Pressure SpO2   05/03/21 2229 05/03/21 2229 05/03/21 2229 05/03/21 2229 05/03/21 2229   98 5 °F (36 9 °C) 58 18 (!) 185/83 100 %      Temp Source Heart Rate Source Patient Position - Orthostatic VS BP Location FiO2 (%)   05/03/21 2229 05/03/21 2229 05/03/21 2245 05/03/21 2229 --   Oral Monitor Sitting Left arm       Pain Score       --                      Physical Exam  Constitutional:       General: He is not in acute distress  Appearance: He is not ill-appearing  HENT:      Head: Normocephalic and atraumatic  Nose: Nose normal    Eyes:      Extraocular Movements: Extraocular movements intact  Pupils: Pupils are equal, round, and reactive to light  Neck:      Musculoskeletal: Normal range of motion and neck supple  Cardiovascular:      Rate and Rhythm: Normal rate and regular rhythm  Pulmonary:      Effort: No respiratory distress  Breath sounds: Normal breath sounds  No wheezing  Chest:      Chest wall: No tenderness  Abdominal:      General: There is no distension  Tenderness:  There is no abdominal tenderness  Musculoskeletal: Normal range of motion  General: No swelling or deformity  Skin:     General: Skin is warm  Findings: No erythema  Neurological:      Mental Status: He is alert and oriented to person, place, and time  Mental status is at baseline                Diagnostic Results      Labs:    Results Reviewed     Procedure Component Value Units Date/Time    Troponin I repeat in 3 hrs [[id number]]     Lab Status: No result Specimen: Blood     Comprehensive metabolic panel [[id number]] Collected: 05/03/21 2245    Lab Status: Final result Specimen: Blood from Arm, Left Updated: 05/03/21 2323     Sodium 137 mmol/L      Potassium 3 6 mmol/L      Chloride 105 mmol/L      CO2 26 mmol/L      ANION GAP 6 mmol/L      BUN 18 mg/dL      Creatinine 0 84 mg/dL      Glucose 119 mg/dL      Calcium 8 5 mg/dL      AST 28 U/L      ALT 32 U/L      Alkaline Phosphatase 65 U/L      Total Protein 7 0 g/dL      Albumin 3 9 g/dL      Total Bilirubin 0 29 mg/dL      eGFR 95 ml/min/1 73sq m     Narrative:      [place] guidelines for Chronic Kidney Disease (CKD):     Stage 1 with normal or high GFR (GFR > 90 mL/min/1 73 square meters)    Stage 2 Mild CKD (GFR = 60-89 mL/min/1 73 square meters)    Stage 3A Moderate CKD (GFR = 45-59 mL/min/1 73 square meters)    Stage 3B Moderate CKD (GFR = 30-44 mL/min/1 73 square meters)    Stage 4 Severe CKD (GFR = 15-29 mL/min/1 73 square meters)    Stage 5 End Stage CKD (GFR <15 mL/min/1 73 square meters)  Note: GFR calculation is accurate only with a steady state creatinine    Troponin I [[id number]]  (Normal) Collected: 05/03/21 2245    Lab Status: Final result Specimen: Blood from Arm, Left Updated: 05/03/21 2313     Troponin I <0 02 ng/mL     CBC and differential [[id number]] Collected: 05/03/21 2245    Lab Status: Final result Specimen: Blood from Arm, Left Updated: 05/03/21 2252     WBC 5 20 Thousand/uL      RBC 4 46 Million/uL Hemoglobin 12 9 g/dL      Hematocrit 38 1 %      MCV 85 fL      MCH 28 9 pg      MCHC 33 9 g/dL      RDW 13 2 %      MPV 9 3 fL      Platelets 731 Thousands/uL      nRBC 0 /100 WBCs      Neutrophils Relative 55 %      Immat GRANS % 0 %      Lymphocytes Relative 32 %      Monocytes Relative 10 %      Eosinophils Relative 2 %      Basophils Relative 1 %      Neutrophils Absolute 2 86 Thousands/µL      Immature Grans Absolute 0 02 Thousand/uL      Lymphocytes Absolute 1 67 Thousands/µL      Monocytes Absolute 0 52 Thousand/µL      Eosinophils Absolute 0 08 Thousand/µL      Basophils Absolute 0 05 Thousands/µL           All labs reviewed and utilized in the medical decision making process    Radiology:    XR chest 1 view portable    (Results Pending)       All radiology studies independently viewed by me and interpreted by the radiologist     Procedure    Procedures        MDM    EKG shows sinus bradycardia with heart rate of 58, narrow QRS, intervals within normal limits, no ST elevation, no significant ST depression, T-wave inversions in leads V1 and V2 as well as leads 1 and aVL, this is similar to prior EKG although the T-wave inversions and 1 in AVL are more pronounced  Given patient's history, concern for ACS, doubt acute aortic pathology or PE  No infectious signs or symptoms  Less likely pneumonia  Will need hospitalization  ED Course as of May 04 0109   Mon May 03, 2021   2323 No acute cardiopulmonary processes, cardiac silhouette similar to prior - on my read     XR chest 1 view portable        HEART score:    History 2=Highly suspicious   ECG 1=Nonspecific repolarization disturbance   Age 1= > 45 - <65 years   Risk Factors 2= > 3 risk factors, or history of atherosclerotic disease   Troponin 0= < Normal limit   Total 6       Score 0-3: 1 7% had a MACE risk    0 4% (1 patient)     36 4% of patients were in this low risk group    Score 4-6: 16 6% had a MACE risk    Score 7-10: 50 1% had a MACE risk       Medications   aspirin chewable tablet 324 mg (324 mg Oral Given 5/3/21 2756)           FINAL IMPRESSION    Final diagnoses:   Chest pain, unspecified type         DISPOSITION    Time reflects when diagnosis was documented in both MDM as applicable and the Disposition within this note     Time User Action Codes Description Comment    5/4/2021 12:06 AM Too Quintero Christian [R07 9] Chest pain, unspecified type       ED Disposition     ED Disposition Condition Date/Time Comment    Admit Stable Tue May 4, 2021 12:06 AM Case was discussed with Tommy Hurley and the patient's admission status was agreed to be Admission Status: observation status to the service of Dr Jamey Ward   Follow-up Information    None           PATIENT REFERRED TO:    No follow-up provider specified  DISCHARGE MEDICATIONS:    Patient's Medications   Discharge Prescriptions    No medications on file       No discharge procedures on file  Claire Francisco DO        This note was partially completed using voice recognition technology, and was scanned for gross errors; however some errors may still exist  Please contact the author with any questions or requests for clarification        Claire Francisco DO  05/04/21 0110

## 2021-05-04 NOTE — ASSESSMENT & PLAN NOTE
· States began having left sided chest pain that radiated into left arm and jaw after hiking for a couple of hours with his family  (+) SOB  (+) nausea  Had coronary stent x 2 placed on 12/16/20  States there was 2 days recently when he missed his medications  · Troponin (-) x 2  Will trend x 3  · 12 lead EKG revealed SB with left anterior fascicular block   T wave inversion more pronounced in lateral leads  · GILMER = 3  · Consult cardiology  · Continue home dose ASA, Plavix, Imdur, Toprol, Tricor, omega 3 and Pravachol

## 2021-05-04 NOTE — ED NOTES
EKG done during triage at  [address] [facility]  05/03/21 7635

## 2021-05-04 NOTE — ASSESSMENT & PLAN NOTE
· BP adequately controlled on current regimen  · Continue home dose Toprol XL  · Monitor BP per unit protocol

## 2021-05-04 NOTE — CONSULTS
Inpatient consult to Cardiology  Consult performed by: Anna Marie Rios MD  Consult ordered by: Harinder Martinez PA-C      Reason for consultation:  Chest pain    HPI:  61-year-old male with coronary artery disease status post PCI of OM and ramus in December 2020(patent LIMA to LAD, occluded SVG grafts), hypertension, hyperlipidemia presented with complaints of chest pain    Patient got  last week and went for hiking on Sunday  He was over exerting and very strenuous activity  He felt left upper quadrant abdominal pain and atypical jaw pain lasting few seconds without any associated symptoms  He denies any fever, chills, diaphoresis, shortness of breath, leg edema or loss of consciousness  He missed his medication for last 2 days  At present time patient denies any complaint except he gets atypical left upper quadrant pain on taking deep breath    Serial troponins are negative  EKG showed sinus rhythm, normal axis, T-wave inversion in lateral leads present on previous EKG    Labs including vitals reviewed  Cardiac telemetry monitoring showed sinus rhythm without any evidence of arrhythmia    Physical exam:  General:  moderate built, awake, alert and oriented x3, not in distress  Neck: supple, no JVD  Eyes: PERRL, conjunctiva normal  Lungs:  Bilateral air entry positive, no wheeze/rhonchi or crackle  Heart:  S1-S2 normal, no murmur  Abdomen:  Soft ,nondistended ,nontender, bowel sounds positive  Extremities:  No leg edema, no deformity, ROM normal  Neuro:  Moving all extremities, speech clear  Skin: warm, no rash    Assessment:  1 atypical chest pain  Acute MI has been ruled out  2  Coronary artery disease status post PCI of OM and ramus in December 2020 with patent LIMA to LAD  3  Hypertension  4   Hyperlipidemia    Recommendation:   Continue telemetry monitoring  I will give Plavix 300 as patient missed it for 2 days followed by 75 mg from tomorrow  Continue aspirin, beta-blocker, Imdur and other home medication  Limited 2D echocardiogram to evaluate for LVEF and regional wall motion abnormalities   patient is requesting to go home as he is feeling fine and relates above symptoms to over exertion   if patient remains asymptomatic and no significant finding on echocardiogram then patient can be discharged with outpatient follow-up in Cardiology Clinic   Above all discussed with patient    Patient understands and agrees     addendum note   Echocardiogram reviewed and discussed with the patient   Patient denies any complaints at present time no chest pain or shortness of breath  And he would like to go home   Patient was advised to return to ER or call 911 if he gets any recurrent chest pain or shortness of breath or as needed   He will follow up in [address] as an outpatient

## 2021-05-04 NOTE — H&P
[place] 1960, 62 y o  male MRN: [id number]  Unit/Bed#: ED 27 Encounter: [id number]  Primary Care Provider: Torie Moncada PA-C   Date and time admitted to hospital: 5/3/2021 10:38 PM    * Chest pain  Assessment & Plan  · States began having left sided chest pain that radiated into left arm and jaw after hiking for a couple of hours with his family  (+) SOB  (+) nausea  Had coronary stent x 2 placed on 12/16/20  States there was 2 days recently when he missed his medications  · Troponin (-) x 2  Will trend x 3  · 12 lead EKG revealed SB with left anterior fascicular block  T wave inversion more pronounced in lateral leads  · GILMER = 3  · Consult cardiology  · Continue home dose ASA, Plavix, Imdur, Toprol, Tricor, omega 3 and Pravachol    History of coronary artery stent placement  Assessment & Plan  · See AP above    Essential hypertension  Assessment & Plan  · BP adequately controlled on current regimen  · Continue home dose Toprol XL  · Monitor BP per unit protocol    Anxiety  Assessment & Plan  · Continue home dose Lexapro and Ativan PRN    Insomnia  Assessment & Plan  · Continue home dose Trazodone    Mixed hyperlipidemia  Assessment & Plan  · Continue home dose Tricor, Pravachol and Omega 3      VTE Prophylaxis: Low risk  Ambulatory  / reason for no mechanical VTE prophylaxis Ambulatory   Code Status: Level 1 Full Code  POLST: POLST form is not discussed and not completed at this time  Discussion with family: NA    Anticipated Length of Stay:  Patient will be admitted on an Observation basis with an anticipated length of stay of  Less than 2 midnights  Justification for Hospital Stay: See AP above    Total Time for Visit, including Counseling / Coordination of Care: 45 minutes  Greater than 50% of this total time spent on direct patient counseling and coordination of care      Chief Complaint:   Chest pain    History of Present Illness:    Susana Huynh Verito Hammer is a 62 y o  male who presents with left sided chest pain that radiated into left arm and jaw after hiking for a couple of hours with his family  (+) SOB  (+) nausea  Had coronary stent x 2 placed on 12/16/20  States there was 2 days recently when he missed his medications  Review of Systems:    Review of Systems   Constitutional: Negative for chills and fever  HENT: Negative for congestion, ear pain, sinus pressure and sore throat  Eyes: Negative for visual disturbance  Respiratory: Positive for cough and shortness of breath  Negative for wheezing  Cardiovascular: Positive for chest pain  Negative for palpitations and leg swelling  Gastrointestinal: Positive for diarrhea and nausea  Negative for abdominal pain, constipation and vomiting  Genitourinary: Negative for difficulty urinating, dysuria and frequency  Musculoskeletal: Negative for neck pain and neck stiffness  Neurological: Negative for dizziness, syncope, light-headedness and headaches  All other systems reviewed and are negative  Past Medical and Surgical History:     Past Medical History:   Diagnosis Date    Anxiety     Coronary artery disease     Depression     Hyperlipidemia     Nephrolithiasis     Rotator cuff tear     Sleep apnea     Subdural hemorrhage-concussion ([facility] [address] )        Past Surgical History:   Procedure Laterality Date    BRAIN SURGERY      CARDIAC CATHETERIZATION      CORONARY ARTERY BYPASS GRAFT      HERNIA REPAIR      INGUINAL HERNIA REPAIR      OTHER SURGICAL HISTORY      cardiac cath procedure outcome - successful     SHOULDER SURGERY      arthroscopy     TONSILLECTOMY      VASECTOMY         Meds/Allergies:    Prior to Admission medications    Medication Sig Start Date End Date Taking?  Authorizing Provider   Ascorbic Acid (VITAMIN C PO) Take by mouth    Historical Provider, MD   aspirin 81 mg chewable tablet Chew 81 mg daily    Historical Provider, MD DODD Complex-C (B-COMPLEX WITH VITAMIN C) tablet Take 1 tablet by mouth daily    Historical Provider, MD   clopidogrel (PLAVIX) 75 mg tablet TAKE 1 TABLET DAILY 1/18/21   Benigno Campos PA-C   Coenzyme Q10 200 MG capsule Take 200 mg by mouth daily    Historical Provider, MD   escitalopram (LEXAPRO) 10 mg tablet TAKE 1 TABLET DAILY 4/21/21   Meliton Guillermo MD   fenofibrate (TRICOR) 145 mg tablet TAKE 1 TABLET DAILY 5/19/20   Deejay Meehan MD   isosorbide mononitrate (IMDUR) 60 mg 24 hr tablet Take 1 5 tablets (90 mg total) by mouth daily 3/8/21   Benigno Campos PA-C   LORazepam (ATIVAN) 0 5 mg tablet Take 1 tablet (0 5 mg total) by mouth 2 (two) times a day 3/8/21   Benigno Campos PA-C   metoprolol succinate (TOPROL-XL) 100 mg 24 hr tablet Take 1 tablet (100 mg total) by mouth daily 12/23/20   Benigno Campos PA-C   nitroglycerin (NITROSTAT) 0 4 mg SL tablet Place 1 tablet (0 4 mg total) under the tongue every 5 (five) minutes as needed for chest pain 11/18/20   Benigno Campos PA-C   Omega-3 Fatty Acids (FISH OIL) 645 MG CAPS Take by mouth Daily    Historical Provider, MD   pravastatin (PRAVACHOL) 40 mg tablet Take 1 tablet (40 mg total) by mouth daily at bedtime 12/23/20   Benigno Campos PA-C   predniSONE 50 mg tablet Take 1 dose by mouth 50 mg 13 hours, 7 hours and 1 hour prior to catheterization 12/7/20   Van Santana MD   traZODone (DESYREL) 50 mg tablet TAKE 1/2 TABLET AT BEDTIME  INCREASE TO 1 TABLET ONCE DAILY AT BED AS NEEDED  12/21/20   Benigno Campos PA-C     I have reviewed home medications with patient personally  Allergies:    Allergies   Allergen Reactions    Iodinated Diagnostic Agents     Meloxicam     Omnipaque [Iohexol]      NEEDS BENADRYL BEFORE PROCEDURE       Social History:     Marital Status:    Patient Pre-hospital Living Situation: Lives w wife  Patient Pre-hospital Level of Mobility: Ambulatory  Patient Pre-hospital Diet Restrictions: None  Substance Use History:   Social History Substance and Sexual Activity   Alcohol Use Not Currently    Comment: social      Social History     Tobacco Use   Smoking Status Former Smoker    Types: Cigarettes    Quit date: 2011    Years since quitting: 10 3   Smokeless Tobacco Never Used   Tobacco Comment    quit about 8 1/2 years ago      Social History     Substance and Sexual Activity   Drug Use Yes    Types: Marijuana    Comment: Few times a week       Family History:    Family History   Problem Relation Age of Onset    Diabetes Mother         mellitus     Depression Mother     Stroke Father     Other Father         other lymphatic and hematopoietic neoplasms     Hypertension Family     Cancer Family     Arthritis Family        Physical Exam:     Vitals:   Blood Pressure: 132/69 (05/04/21 0200)  Pulse: (!) 51 (05/04/21 0200)  Temperature: 98 5 °F (36 9 °C) (05/03/21 2229)  Temp Source: Oral (05/03/21 2229)  Respirations: 20 (05/04/21 0200)  SpO2: 96 % (05/04/21 0200)    Physical Exam  Vitals signs reviewed  Constitutional:       General: He is not in acute distress  Appearance: Normal appearance  HENT:      Head: Normocephalic and atraumatic  Mouth/Throat:      Comments: deferred  Eyes:      Extraocular Movements: Extraocular movements intact  Neck:      Musculoskeletal: Normal range of motion and neck supple  Cardiovascular:      Rate and Rhythm: Regular rhythm  Bradycardia present  Pulses: Normal pulses  Heart sounds: Normal heart sounds  Pulmonary:      Effort: Pulmonary effort is normal  No respiratory distress  Breath sounds: Normal breath sounds  No wheezing or rhonchi  Abdominal:      Palpations: Abdomen is soft  Tenderness: There is no abdominal tenderness  There is no guarding or rebound  Musculoskeletal: Normal range of motion  General: No swelling or tenderness  Skin:     General: Skin is warm and dry  Neurological:      General: No focal deficit present        Mental Status: He is alert and oriented to person, place, and time  Psychiatric:         Mood and Affect: Mood normal          Behavior: Behavior normal          Thought Content: Thought content normal          Additional Data:     Lab Results: I have personally reviewed pertinent reports  Results from last 7 days   Lab Units 05/03/21  2245   WBC Thousand/uL 5 20   HEMOGLOBIN g/dL 12 9   HEMATOCRIT % 38 1   PLATELETS Thousands/uL 206   NEUTROS PCT % 55   LYMPHS PCT % 32   MONOS PCT % 10   EOS PCT % 2     Results from last 7 days   Lab Units 05/03/21  2245   SODIUM mmol/L 137   POTASSIUM mmol/L 3 6   CHLORIDE mmol/L 105   CO2 mmol/L 26   BUN mg/dL 18   CREATININE mg/dL 0 84   ANION GAP mmol/L 6   CALCIUM mg/dL 8 5   ALBUMIN g/dL 3 9   TOTAL BILIRUBIN mg/dL 0 29   ALK PHOS U/L 65   ALT U/L 32   AST U/L 28   GLUCOSE RANDOM mg/dL 119                       Imaging: I have personally reviewed pertinent films in PACS    XR chest 1 view portable    (Results Pending)       EKG, Pathology, and Other Studies Reviewed on Admission:   · EKG: SB with left anterior fascicular block  T wave inversion in lateral leads    Allscripts / Epic Records Reviewed: Yes     ** Please Note: This note has been constructed using a voice recognition system   **

## 2021-05-04 NOTE — DISCHARGE SUMMARY
[address], [place]  Discharge- [facility] 1960, 62 y o  male MRN: [id number]  Unit/Bed#: ED 27 Encounter: [id number]  Primary Care Provider: Sharad Paniagua PA-C   Date and time admitted to hospital: 5/3/2021 10:38 PM    * Chest pain  Assessment & Plan  · Troponin (-) x 3    · 12 lead EKG showed sinus rhythm, normal axis, T-wave inversion in lateral leads present on previous EKG  · 2D echocardiogram notes ejection fraction 55-60%/no wall motion abnormalities  · Continue home dose ASA, Plavix, Imdur, Toprol, Tricor, omega 3 and Pravachol  · Discussed with Cardiology, recommendation for discharge in outpatient follow-up  · Currently patient is chest pain-free  History of coronary artery stent placement  Assessment & Plan  · Status post PTCA of superior/inferior ramus branch with drug-eluting stent in December of 2020  · Follow up with outpatient Cardiology  · Continue with dual antiplatelet therapy/statin  Essential hypertension  Assessment & Plan  · BP adequately controlled on current regimen  · Continue home dose Toprol XL    Mixed hyperlipidemia  Assessment & Plan  · Continue home dose Tricor, Pravachol and Omega 3    Insomnia  Assessment & Plan  · Continue home dose Trazodone    Anxiety  Assessment & Plan  · Continue home dose Lexapro and Ativan PRN      Discharging Physician / Practitioner: Tayler Garrison MD  PCP: Sharad Paniagua PA-C  Admission Date:   Admission Orders (From admission, onward)     Ordered        05/04/21 0007  Place in Observation  Once                   Discharge Date: 05/04/21    Disposition:    · home    Reason for Admission: chest pain    Discharge Diagnoses:     Please see assessment and plan section above for further details regarding discharge diagnoses       Resolved Problems  Date Reviewed: 5/4/2021    None          Consultations During Hospital Stay:  · cardiology    Medication Adjustments and Discharge Medications:  · Summary of Medication Adjustments made as a result of this hospitalization: see med rec  · Medication Dosing Tapers - Please refer to Discharge Medication List for details on any medication dosing tapers (if applicable to patient)  · Medications being temporarily held (include recommended restart time): see med rec  · Discharge Medication List: See after visit summary for reconciled discharge medications  Diet Recommendations at Discharge:  Diet -        Diet Orders   (From admission, onward)             Start     Ordered    05/04/21 0656  Diet Cardiovascular; Cardiac; No Chocolate, No Caffeine  Diet effective now     Question Answer Comment   Diet Type Cardiovascular    Cardiac Cardiac    Other Restriction(s): No Chocolate    Other Restriction(s): No Caffeine    RD to adjust diet per protocol? Yes        05/04/21 0655                Hospital Course:     Rosaura White is a 62 y o  male patient who originally presented to the hospital on 5/3/2021 due to left-sided chest pain that radiated into his left arm/show after hiking for a few hours with his family  It was associated with shortness of breath/nausea  In the ED 12 lead EKG did note normal sinus rhythm with T-wave inversions in the lateral leads which was present on previous EKG as well  Troponin x3 negative  Cardiology was consulted  2D echocardiogram noted no evidence of wall motion abnormality/ejection fraction of 55-60%  Patient was chest pain-free and after discussion with Cardiology was decided to be discharged home with outpatient follow-up with the cardiac clinic  In the meantime patient to continue his aspirin/Plavix/beta-blocker/Imdur  Please note patient had not taken his Plavix for 2 days hence he did receive loading dose Plavix 300 mg to be followed by 75 mg starting tomorrow  Above plan of care discussed with patient who is in agreement  Being discharged home      Condition at Discharge: good     Discharge Day Visit / Exam:     Vitals: Blood Pressure: 132/73 (05/04/21 1109)  Pulse: (!) 52 (05/04/21 1109)  Temperature: 98 5 °F (36 9 °C) (05/03/21 2229)  Temp Source: Oral (05/03/21 2229)  Respirations: 17 (05/04/21 1109)  SpO2: 98 % (05/04/21 1109)  Exam:   Physical Exam  Constitutional:       General: He is not in acute distress  Appearance: He is normal weight  He is not toxic-appearing  HENT:      Mouth/Throat:      Mouth: Mucous membranes are moist       Pharynx: Oropharynx is clear  Cardiovascular:      Rate and Rhythm: Normal rate and regular rhythm  Pulses: Normal pulses  Pulmonary:      Effort: Pulmonary effort is normal  No respiratory distress  Breath sounds: No wheezing or rales  Abdominal:      General: Bowel sounds are normal  There is distension  Tenderness: There is no abdominal tenderness  Skin:     General: Skin is warm and dry  Neurological:      General: No focal deficit present  Mental Status: He is alert and oriented to person, place, and time  Psychiatric:         Mood and Affect: Mood normal          Behavior: Behavior normal        Goals of Care Discussions:  · Code Status at Discharge: Level 1 - Full Code    Discharge instructions/Information to patient and family:   See after visit summary section titled Discharge Instructions for information provided to patient and family  Discharge Statement:  I spent 45 minutes discharging the patient  This time was spent on the day of discharge  I had direct contact with the patient on the day of discharge  Greater than 50% of the total time was spent examining patient, answering all patient questions, arranging and discussing plan of care with patient as well as directly providing post-discharge instructions  Additional time then spent on discharge activities      ** Please Note: This note has been constructed using a voice recognition system **

## 2021-05-05 ENCOUNTER — TRANSITIONAL CARE MANAGEMENT (OUTPATIENT)
Dept: FAMILY MEDICINE CLINIC | Facility: CLINIC | Age: 61
End: 2021-05-05

## 2021-05-07 ENCOUNTER — OFFICE VISIT (OUTPATIENT)
Dept: FAMILY MEDICINE CLINIC | Facility: CLINIC | Age: 61
End: 2021-05-07
Payer: COMMERCIAL

## 2021-05-07 VITALS
BODY MASS INDEX: 29.09 KG/M2 | HEIGHT: 66 IN | TEMPERATURE: 97.7 F | SYSTOLIC BLOOD PRESSURE: 138 MMHG | RESPIRATION RATE: 18 BRPM | OXYGEN SATURATION: 96 % | HEART RATE: 61 BPM | DIASTOLIC BLOOD PRESSURE: 70 MMHG | WEIGHT: 181 LBS

## 2021-05-07 DIAGNOSIS — G47.00 INSOMNIA, UNSPECIFIED TYPE: ICD-10-CM

## 2021-05-07 DIAGNOSIS — Z12.5 SCREENING FOR PROSTATE CANCER: ICD-10-CM

## 2021-05-07 DIAGNOSIS — I25.708 CORONARY ARTERY DISEASE OF BYPASS GRAFT OF NATIVE HEART WITH STABLE ANGINA PECTORIS (HCC): ICD-10-CM

## 2021-05-07 DIAGNOSIS — Z95.5 HISTORY OF CORONARY ARTERY STENT PLACEMENT: ICD-10-CM

## 2021-05-07 DIAGNOSIS — Z95.1 HX OF CABG: ICD-10-CM

## 2021-05-07 DIAGNOSIS — I10 ESSENTIAL HYPERTENSION: ICD-10-CM

## 2021-05-07 DIAGNOSIS — I20.0 UNSTABLE ANGINA (HCC): ICD-10-CM

## 2021-05-07 DIAGNOSIS — E78.2 MIXED HYPERLIPIDEMIA: ICD-10-CM

## 2021-05-07 DIAGNOSIS — R07.9 CHEST PAIN, UNSPECIFIED TYPE: Primary | Chronic | ICD-10-CM

## 2021-05-07 PROCEDURE — 1111F DSCHRG MED/CURRENT MED MERGE: CPT | Performed by: PHYSICIAN ASSISTANT

## 2021-05-07 PROCEDURE — 99496 TRANSJ CARE MGMT HIGH F2F 7D: CPT | Performed by: PHYSICIAN ASSISTANT

## 2021-05-07 NOTE — PROGRESS NOTES
Assessment/Plan:  TCM Call (since 4/6/2021)     Date and time call was made  5/5/2021  5:44 PM    Hospital care reviewed  Records reviewed    Patient was hospitialized at  [facility]        Date of Admission  05/03/21    Date of discharge  05/04/21    Diagnosis  Chest pain    Disposition  Home    Current Symptoms  None      TCM Call (since 4/6/2021)     Scheduled for follow up? Yes    I have advised the patient to call PCP with any new or worsening symptoms  Pauline Callahan MA    Counseling  Patient    Counseling topics  Importance of RX compliance           Diagnoses and all orders for this visit:    Chest pain, unspecified type  Comments: This has resolved  Patient will continue his current regimen and follow-up with Cardiology    Hx of CABG  Comments:  No further angina    History of coronary artery stent placement    Coronary artery disease of bypass graft of native heart with stable angina pectoris ([facility] )  Comments:  Patient is stable no signs of congestive heart failure    Essential hypertension  Comments:  Blood pressure is at goal continue current regimen    Unstable angina ([facility] )    Mixed hyperlipidemia  Comments:  Continue statin therapy and diet  Patient due for labs  Orders:  -     Lipid panel    Insomnia, unspecified type    Screening for prostate cancer  -     PSA, Total Screen          Subjective:      Patient ID: Gaynelle Kayser is a 62 y o  male  Patient presents in the office for transition of care management  Patient was hospitalized May 3rd through May 4th  Went to the emergency room with chest pain  He was kept for observation  Patient has an extensive cardiac history  Serial troponins were negative labs were good chest x-ray was normal an EKG showed no acute changes  He was discharged on his same medicines instructed to follow-up with cardiology  No issues with medication  No further chest pain        The following portions of the patient's history were reviewed and updated as appropriate:   He   Patient Active Problem List    Diagnosis Date Noted    History of coronary artery stent placement 05/04/2021    [facility] ) 11/12/2020    Essential hypertension 11/12/2020    Benign prostatic hyperplasia with urinary hesitancy 02/07/2020    Prediabetes 11/06/2019    Screening for prostate cancer 09/24/2018    Screen for colon cancer 09/24/2018    Hx of CABG 03/09/2018    Low HDL (under 40) 11/17/2017    Depression, major, recurrent, mild ([facility] ) 08/21/2017    Chest pain 08/09/2017    Obesity 03/21/2016    Obstructive sleep apnea 04/14/2014    Mixed hyperlipidemia 08/22/2013    Anxiety 03/01/2012    Coronary artery disease of bypass graft of native heart with stable angina pectoris ([facility] ) 03/01/2012    Esophageal reflux 03/01/2012    Insomnia 03/01/2012    Leg wound, left 03/01/2012     Current Outpatient Medications   Medication Sig Dispense Refill    Ascorbic Acid (VITAMIN C PO) Take by mouth      aspirin 81 mg chewable tablet Chew 81 mg daily      B Complex-C (B-COMPLEX WITH VITAMIN C) tablet Take 1 tablet by mouth daily      clopidogrel (PLAVIX) 75 mg tablet TAKE 1 TABLET DAILY 90 tablet 1    Coenzyme Q10 200 MG capsule Take 200 mg by mouth daily      escitalopram (LEXAPRO) 10 mg tablet TAKE 1 TABLET DAILY 90 tablet 0    fenofibrate (TRICOR) 145 mg tablet TAKE 1 TABLET DAILY 90 tablet 3    isosorbide mononitrate (IMDUR) 60 mg 24 hr tablet Take 1 5 tablets (90 mg total) by mouth daily 135 tablet 1    isosorbide mononitrate (IMDUR) 60 mg 24 hr tablet Take 1 tablet (60 mg total) by mouth daily 30 tablet 0    LORazepam (ATIVAN) 0 5 mg tablet Take 1 tablet (0 5 mg total) by mouth 2 (two) times a day 60 tablet 0    metoprolol succinate (TOPROL-XL) 100 mg 24 hr tablet Take 1 tablet (100 mg total) by mouth daily 90 tablet 1    nitroglycerin (NITROSTAT) 0 4 mg SL tablet Place 1 tablet (0 4 mg total) under the tongue every 5 (five) minutes as needed for chest pain 30 tablet 5    Omega-3 Fatty Acids (FISH OIL) 645 MG CAPS Take by mouth Daily      pravastatin (PRAVACHOL) 40 mg tablet Take 1 tablet (40 mg total) by mouth daily at bedtime 90 tablet 1    traZODone (DESYREL) 50 mg tablet TAKE 1/2 TABLET AT BEDTIME  INCREASE TO 1 TABLET ONCE DAILY AT BED AS NEEDED  90 tablet 0     No current facility-administered medications for this visit  He is allergic to iodinated diagnostic agents; meloxicam; and omnipaque [iohexol]       Review of Systems   Constitutional: Negative for activity change, appetite change, chills, fatigue and fever  HENT: Negative for ear pain and sore throat  Eyes: Negative for visual disturbance  Respiratory: Negative for cough and shortness of breath  Cardiovascular: Negative for chest pain, palpitations and leg swelling  Gastrointestinal: Negative for abdominal pain, blood in stool, constipation, diarrhea and nausea  Genitourinary: Negative for difficulty urinating  Musculoskeletal: Negative for arthralgias, back pain and myalgias  Skin: Negative for rash  Neurological: Negative for dizziness, syncope and headaches  Psychiatric/Behavioral: Negative for sleep disturbance  Objective:        Physical Exam  Vitals signs and nursing note reviewed  Constitutional:       General: He is not in acute distress  Appearance: Normal appearance  He is well-developed  He is not ill-appearing  HENT:      Head: Normocephalic and atraumatic  Right Ear: Tympanic membrane, ear canal and external ear normal       Left Ear: Tympanic membrane, ear canal and external ear normal    Eyes:      Conjunctiva/sclera: Conjunctivae normal       Pupils: Pupils are equal, round, and reactive to light  Neck:      Thyroid: No thyromegaly  Vascular: No carotid bruit  Cardiovascular:      Rate and Rhythm: Normal rate and regular rhythm  Heart sounds: Normal heart sounds  No murmur     Pulmonary:      Effort: Pulmonary effort is normal       Breath sounds: Normal breath sounds  No wheezing  Abdominal:      General: Bowel sounds are normal       Palpations: Abdomen is soft  There is no mass  Tenderness: There is abdominal tenderness in the epigastric area  Musculoskeletal:      Right lower leg: No edema  Left lower leg: No edema  Lymphadenopathy:      Cervical: No cervical adenopathy  Skin:     General: Skin is warm and dry  Neurological:      General: No focal deficit present  Mental Status: He is alert and oriented to person, place, and time  Psychiatric:         Mood and Affect: Mood normal          Behavior: Behavior normal          Thought Content:  Thought content normal          Judgment: Judgment normal

## 2021-05-10 DIAGNOSIS — E78.2 MIXED HYPERLIPIDEMIA: ICD-10-CM

## 2021-05-11 RX ORDER — FENOFIBRATE 145 MG/1
145 TABLET, COATED ORAL DAILY
Qty: 90 TABLET | Refills: 1 | Status: SHIPPED | OUTPATIENT
Start: 2021-05-11 | End: 2021-10-29

## 2021-05-26 ENCOUNTER — TELEPHONE (OUTPATIENT)
Dept: GASTROENTEROLOGY | Facility: CLINIC | Age: 61
End: 2021-05-26

## 2021-05-28 NOTE — TELEPHONE ENCOUNTER
05/28/21  Screened by: [facility]    Referring Provider Lenora Justice    Pre- Screening: There is no height or weight on file to calculate BMI  Has patient been referred for a routine screening Colonoscopy? yes  Is the patient between 39-70 years old? yes      Previous Colonoscopy no   If yes:    Date:     Facility:     Reason:       SCHEDULING STAFF: If the patient is between 45yrs-49yrs, please advise patient to confirm benefits/coverage with their insurance company for a routine screening colonoscopy, some insurance carriers will only cover at [phone] or older  If the patient is over 66years old, please schedule an office visit  Does the patient want to see a Gastroenterologist prior to their procedure OR are they having any GI symptoms? no    Has the patient been hospitalized or had abdominal surgery in the past 6 months? yes    Does the patient use supplemental oxygen? no    Does the patient take Coumadin, Lovenox, Plavix, Elliquis, Xarelto, or other blood thinning medication? yes    Has the patient had a stroke, cardiac event, or stent placed in the past year? yes    SCHEDULING STAFF: If patient answers NO to above questions, then schedule procedure  If patient answers YES to above questions, then schedule office appointment  If patient is between 45yrs - 49yrs, please advise patient that we will have to confirm benefits & coverage with their insurance company for a routine screening colonoscopy

## 2021-06-04 ENCOUNTER — APPOINTMENT (OUTPATIENT)
Dept: LAB | Facility: MEDICAL CENTER | Age: 61
End: 2021-06-04
Payer: COMMERCIAL

## 2021-06-04 LAB
CHOLEST SERPL-MCNC: 87 MG/DL (ref 50–200)
HDLC SERPL-MCNC: 18 MG/DL
LDLC SERPL CALC-MCNC: 47 MG/DL (ref 0–100)
NONHDLC SERPL-MCNC: 69 MG/DL
PSA SERPL-MCNC: 0.3 NG/ML (ref 0–4)
TRIGL SERPL-MCNC: 112 MG/DL

## 2021-06-04 PROCEDURE — G0103 PSA SCREENING: HCPCS | Performed by: PHYSICIAN ASSISTANT

## 2021-06-04 PROCEDURE — 80061 LIPID PANEL: CPT | Performed by: PHYSICIAN ASSISTANT

## 2021-06-04 PROCEDURE — 36415 COLL VENOUS BLD VENIPUNCTURE: CPT | Performed by: PHYSICIAN ASSISTANT

## 2021-06-09 ENCOUNTER — OFFICE VISIT (OUTPATIENT)
Dept: CARDIOLOGY CLINIC | Facility: CLINIC | Age: 61
End: 2021-06-09
Payer: COMMERCIAL

## 2021-06-09 VITALS
SYSTOLIC BLOOD PRESSURE: 134 MMHG | HEART RATE: 55 BPM | OXYGEN SATURATION: 97 % | WEIGHT: 177 LBS | BODY MASS INDEX: 28.45 KG/M2 | HEIGHT: 66 IN | DIASTOLIC BLOOD PRESSURE: 78 MMHG

## 2021-06-09 DIAGNOSIS — E78.2 MIXED HYPERLIPIDEMIA: ICD-10-CM

## 2021-06-09 DIAGNOSIS — I25.10 ATHEROSCLEROSIS OF NATIVE CORONARY ARTERY OF NATIVE HEART WITHOUT ANGINA PECTORIS: Primary | ICD-10-CM

## 2021-06-09 DIAGNOSIS — I10 ESSENTIAL HYPERTENSION: ICD-10-CM

## 2021-06-09 PROCEDURE — 99214 OFFICE O/P EST MOD 30 MIN: CPT | Performed by: INTERNAL MEDICINE

## 2021-06-09 PROCEDURE — 3075F SYST BP GE 130 - 139MM HG: CPT | Performed by: INTERNAL MEDICINE

## 2021-06-09 PROCEDURE — 3008F BODY MASS INDEX DOCD: CPT | Performed by: INTERNAL MEDICINE

## 2021-06-09 PROCEDURE — 1036F TOBACCO NON-USER: CPT | Performed by: INTERNAL MEDICINE

## 2021-06-09 PROCEDURE — 3078F DIAST BP <80 MM HG: CPT | Performed by: INTERNAL MEDICINE

## 2021-06-09 NOTE — PROGRESS NOTES
[facility] [place]  [id number]  Cardiology Follow Up    [address]  1960  [id number]      1  Atherosclerosis of native coronary artery of native heart without angina pectoris     2  Essential hypertension     3  Mixed hyperlipidemia         Chief Complaint   Patient presents with    Follow-up     hospital ~ CP        Interval History:    71-year-old male with coronary artery disease( status post successful bifurcational PCI of superior and inferior branch of ramus), hypertension, hyperlipidemia, family history of premature coronary artery disease presented for cardiology follow-up     patient is doing well since last cardiology clinic  He underwent bifurcational PCI as mentioned above since last clinic visit  After PCI patient anginal symptoms resolved and is feeling much better  He had emergency department visit early in 2021 for some atypical chest pain  Acute MI was ruled out and echocardiogram showed preserved LVEF  He had missed a few medications prior to ED visit    Since ED discharge patient is doing well he denies any chest pain, shortness of breath, palpitation, dizziness, orthopnea, leg edema or loss of consciousness   Exercise tolerance is unlimited and patient walks more than 3-4 flight of stairs daily without any chest pain or shortness of breath   Lately he is feeling some pinching like in left precordium which is reproducible on palpation and heard a pop few days back likely musculoskeletal in origin    Current medications reviewed   Recent labs reviewed     Review of Systems:   all review of system negative except as mentioned above    Patient Active Problem List   Diagnosis    Chest pain    Anxiety    Coronary artery disease of bypass graft of native heart with stable angina pectoris ([facility] [address] )    Depression, major, recurrent, mild (HCC)    Esophageal reflux    Mixed hyperlipidemia    Insomnia    Low HDL (under 40)    Obesity    Obstructive sleep apnea    Leg wound, left    Hx of CABG    Screening for prostate cancer    Screen for colon cancer    Prediabetes    Benign prostatic hyperplasia with urinary hesitancy    Unstable angina (HCC)    Essential hypertension    History of coronary artery stent placement     Past Medical History:   Diagnosis Date    Anxiety     Coronary artery disease     Depression     Hyperlipidemia     Nephrolithiasis     Rotator cuff tear     Sleep apnea     Subdural hemorrhage-concussion ([facility] [address] )      Social History     Socioeconomic History    Marital status:      Spouse name: Not on file    Number of children: Not on file    Years of education: Not on file    Highest education level: Not on file   Occupational History    Not on file   Social Needs    Financial resource strain: Not on file    Food insecurity     Worry: Not on file     Inability: Not on file    Transportation needs     Medical: Not on file     Non-medical: Not on file   Tobacco Use    Smoking status: Former Smoker     Types: Cigarettes     Quit date: 2011     Years since quitting: 10 4    Smokeless tobacco: Never Used    Tobacco comment: quit about 8 1/2 years ago    Substance and Sexual Activity    Alcohol use: Not Currently     Comment: social     Drug use: Yes     Types: Marijuana     Comment: Few times a week    Sexual activity: Yes     Partners: Female     Birth control/protection: None   Lifestyle    Physical activity     Days per week: Not on file     Minutes per session: Not on file    Stress: Not on file   Relationships    Social connections     Talks on phone: Not on file     Gets together: Not on file     Attends Holiness service: Not on file     Active member of club or organization: Not on file     Attends meetings of clubs or organizations: Not on file     Relationship status: Not on file    Intimate partner violence     Fear of current or ex partner: Not on file     Emotionally abused: Not on file     Physically abused: Not on file     Forced sexual activity: Not on file   Other Topics Concern    Not on file   Social History Narrative    Caffeine use       Family History   Problem Relation Age of Onset    Diabetes Mother         mellitus     Depression Mother     Stroke Father     Other Father         other lymphatic and hematopoietic neoplasms     Hypertension Family     Cancer Family     Arthritis Family      Past Surgical History:   Procedure Laterality Date    BRAIN SURGERY      CARDIAC CATHETERIZATION      CORONARY ARTERY BYPASS GRAFT      HERNIA REPAIR      INGUINAL HERNIA REPAIR      OTHER SURGICAL HISTORY      cardiac cath procedure outcome - successful     SHOULDER SURGERY      arthroscopy     TONSILLECTOMY      VASECTOMY         Current Outpatient Medications:     Ascorbic Acid (VITAMIN C PO), Take by mouth, Disp: , Rfl:     aspirin 81 mg chewable tablet, Chew 81 mg daily, Disp: , Rfl:     B Complex-C (B-COMPLEX WITH VITAMIN C) tablet, Take 1 tablet by mouth daily, Disp: , Rfl:     clopidogrel (PLAVIX) 75 mg tablet, TAKE 1 TABLET DAILY, Disp: 90 tablet, Rfl: 1    Coenzyme Q10 200 MG capsule, Take 200 mg by mouth daily, Disp: , Rfl:     escitalopram (LEXAPRO) 10 mg tablet, TAKE 1 TABLET DAILY, Disp: 90 tablet, Rfl: 0    fenofibrate (TRICOR) 145 mg tablet, Take 1 tablet (145 mg total) by mouth daily, Disp: 90 tablet, Rfl: 1    isosorbide mononitrate (IMDUR) 60 mg 24 hr tablet, Take 1 tablet (60 mg total) by mouth daily, Disp: 30 tablet, Rfl: 0    LORazepam (ATIVAN) 0 5 mg tablet, Take 1 tablet (0 5 mg total) by mouth 2 (two) times a day, Disp: 60 tablet, Rfl: 0    metoprolol succinate (TOPROL-XL) 100 mg 24 hr tablet, Take 1 tablet (100 mg total) by mouth daily, Disp: 90 tablet, Rfl: 1    nitroglycerin (NITROSTAT) 0 4 mg SL tablet, Place 1 tablet (0 4 mg total) under the tongue every 5 (five) minutes as needed for chest pain, Disp: 30 tablet, Rfl: 5   Omega-3 Fatty Acids (FISH OIL) 645 MG CAPS, Take by mouth Daily, Disp: , Rfl:     pravastatin (PRAVACHOL) 40 mg tablet, Take 1 tablet (40 mg total) by mouth daily at bedtime, Disp: 90 tablet, Rfl: 1    traZODone (DESYREL) 50 mg tablet, TAKE 1/2 TABLET AT BEDTIME   INCREASE TO 1 TABLET ONCE DAILY AT BED AS NEEDED , Disp: 90 tablet, Rfl: 0    isosorbide mononitrate (IMDUR) 60 mg 24 hr tablet, Take 1 5 tablets (90 mg total) by mouth daily (Patient not taking: Reported on 6/9/2021), Disp: 135 tablet, Rfl: 1  Allergies   Allergen Reactions    Iodinated Diagnostic Agents     Meloxicam     Omnipaque [Iohexol]      NEEDS BENADRYL BEFORE PROCEDURE       Labs:  Office Visit on 05/07/2021   Component Date Value    PSA 06/04/2021 0 3     Cholesterol 06/04/2021 87     Triglycerides 06/04/2021 112     HDL, Direct 06/04/2021 18*    LDL Calculated 06/04/2021 47     Non-HDL-Chol (CHOL-HDL) 06/04/2021 69    Admission on 05/03/2021, Discharged on 05/04/2021   Component Date Value    WBC 05/03/2021 5 20     RBC 05/03/2021 4 46     Hemoglobin 05/03/2021 12 9     Hematocrit 05/03/2021 38 1     MCV 05/03/2021 85     MCH 05/03/2021 28 9     MCHC 05/03/2021 33 9     RDW 05/03/2021 13 2     MPV 05/03/2021 9 3     Platelets 97/05/7067 206     nRBC 05/03/2021 0     Neutrophils Relative 05/03/2021 55     Immat GRANS % 05/03/2021 0     Lymphocytes Relative 05/03/2021 32     Monocytes Relative 05/03/2021 10     Eosinophils Relative 05/03/2021 2     Basophils Relative 05/03/2021 1     Neutrophils Absolute 05/03/2021 2 86     Immature Grans Absolute 05/03/2021 0 02     Lymphocytes Absolute 05/03/2021 1 67     Monocytes Absolute 05/03/2021 0 52     Eosinophils Absolute 05/03/2021 0 08     Basophils Absolute 05/03/2021 0 05     Sodium 05/03/2021 137     Potassium 05/03/2021 3 6     Chloride 05/03/2021 105     CO2 05/03/2021 26     ANION GAP 05/03/2021 6     BUN 05/03/2021 18     Creatinine 05/03/2021 0 84     Glucose 05/03/2021 119     Calcium 05/03/2021 8 5     AST 05/03/2021 28     ALT 05/03/2021 32     Alkaline Phosphatase 05/03/2021 65     Total Protein 05/03/2021 7 0     Albumin 05/03/2021 3 9     Total Bilirubin 05/03/2021 0 29     eGFR 05/03/2021 95     Troponin I 05/03/2021 <0 02     Troponin I 05/04/2021 <0 02     Troponin I 05/04/2021 <0 02     Sodium 05/04/2021 140     Potassium 05/04/2021 3 8     Chloride 05/04/2021 107     CO2 05/04/2021 27     ANION GAP 05/04/2021 6     BUN 05/04/2021 17     Creatinine 05/04/2021 0 74     Glucose 05/04/2021 121     Calcium 05/04/2021 8 6     eGFR 05/04/2021 100     Magnesium 05/04/2021 1 7     Ventricular Rate 05/03/2021 58     Atrial Rate 05/03/2021 58     LA Interval 05/03/2021 170     QRSD Interval 05/03/2021 96     QT Interval 05/03/2021 416     QTC Interval 05/03/2021 408     P Axis 05/03/2021 19     QRS Axis 05/03/2021 -87     T Wave Axis 05/03/2021 121    Hospital Outpatient Visit on 12/16/2020   Component Date Value    Sodium 12/16/2020 138     Potassium 12/16/2020 4 0     Chloride 12/16/2020 102     CO2 12/16/2020 24     ANION GAP 12/16/2020 12     BUN 12/16/2020 21     Creatinine 12/16/2020 0 86     Glucose 12/16/2020 204*    Glucose, Fasting 12/16/2020 204*    Calcium 12/16/2020 9 2     eGFR 12/16/2020 95     Activated Clotting Time,* 12/16/2020 293*    Specimen Type 12/16/2020 VENOUS     Activated Clotting Time,* 12/16/2020 231*    Specimen Type 12/16/2020 VENOUS      Imaging: No results found      Physical Exam:  General:  moderate built, awake, alert and oriented x3, not in distress  Neck: supple, no JVD   left precordium under arm focal point of tenderness without any evidence of localized swelling or discharge  Eyes: PERRL, conjunctiva normal  Lungs:  Bilateral air entry positive, no wheeze/rhonchi or crackle  Heart:  S1-S2 normal, no murmur  Abdomen:  Soft ,nondistended ,nontender, bowel sounds positive  Extremities:  No leg edema, no deformity, ROM normal  Neuro:  Moving all extremities, speech clear  Skin: warm, no rash    /78   Pulse 55   Ht 5' 6" (1 676 m)   Wt 80 3 kg (177 lb)   SpO2 97%   BMI 28 57 kg/m²     Cardiographics :  Limited Echo  In May 2021 showed LVEF of 55-60%, mildly dilated LA, mild MR      cardiac PCI on December 16, 2020:   superior branch of ramus Xience Sarita 2 5 x 38 and 2 5 x 15   inferior branch of ramus Xience Sarita 3 0 x 33 and post dilated with 375   proximal OM 85% stenosis, LPDA 100% occluded, proximal % occluded    Cardiac catheterization on 11/13/2020 showed triple-vessel coronary artery disease chronic total occlusion of proximal LAD with patent LIMA to LAD  Ramus intermediate is medium to large caliber vessel with diffusely disease and maximum of 90% stenosis  Proximal OM to 80% stenosis  LPDA is 100% occluded  Proximal RCA chronic total occlusion  Distal RCA fills from left to right collateral from LAD  Graft to the LPDA/RCA is occluded  Case was discussed with CT surgery during recent hospital course and patient is not a candidate for redo bypass    Assessment:    1  Coronary artery disease    status post bifurcational PCI of superior inferior branch of ramus in December 2020, patent LIMA to LAD  ( proximal OM 85% stenosis, graft to L PDA/RCA is occluded, native RCA and native L PDA is 100% occluded)   currently denies chest pain or shortness of breath    2  Hypertension controlled  3  Hyperlipidemia  LDL at goal  4    Family history of premature coronary artery disease      Recommendations:   patient is doing well from cardiology standpoint at present time   Patient to continue aspirin, Plavix, Toprol- mg daily, pravastatin 40 mg daily, Imdur 60 mg daily and fenofibrate     advised to take low-salt, low-fat/low-cholesterol diet   Return to clinic in 6 months or early if needed  Call 911 or go to nearest ED as needed   Above all discussed with patient    Patient understands and agrees

## 2021-06-11 ENCOUNTER — TELEPHONE (OUTPATIENT)
Dept: GASTROENTEROLOGY | Facility: CLINIC | Age: 61
End: 2021-06-11

## 2021-06-11 NOTE — TELEPHONE ENCOUNTER
Patient called to cancel his office visit does not want to wait to have his colonoscopy only off of work the last week of [place] 1st week on July 2021  Informed patient that we offer Saturday appointments  Patient was mean  Did not want to reschedule  Told patient I hope he changes his mind  Do not call patient

## 2021-06-12 DIAGNOSIS — I25.10 CORONARY ARTERY DISEASE INVOLVING NATIVE HEART WITHOUT ANGINA PECTORIS, UNSPECIFIED VESSEL OR LESION TYPE: ICD-10-CM

## 2021-06-12 DIAGNOSIS — E78.2 MIXED HYPERLIPIDEMIA: ICD-10-CM

## 2021-06-14 RX ORDER — METOPROLOL SUCCINATE 100 MG/1
TABLET, EXTENDED RELEASE ORAL
Qty: 90 TABLET | Refills: 1 | Status: SHIPPED | OUTPATIENT
Start: 2021-06-14 | End: 2021-12-06

## 2021-06-14 RX ORDER — PRAVASTATIN SODIUM 40 MG
TABLET ORAL
Qty: 90 TABLET | Refills: 1 | Status: SHIPPED | OUTPATIENT
Start: 2021-06-14 | End: 2021-12-06

## 2021-07-10 DIAGNOSIS — I25.10 CORONARY ARTERY DISEASE INVOLVING NATIVE CORONARY ARTERY OF NATIVE HEART WITHOUT ANGINA PECTORIS: ICD-10-CM

## 2021-07-10 RX ORDER — CLOPIDOGREL BISULFATE 75 MG/1
TABLET ORAL
Qty: 90 TABLET | Refills: 1 | Status: SHIPPED | OUTPATIENT
Start: 2021-07-10 | End: 2022-01-06

## 2021-07-11 DIAGNOSIS — F32.A DEPRESSION, UNSPECIFIED DEPRESSION TYPE: ICD-10-CM

## 2021-07-14 RX ORDER — ESCITALOPRAM OXALATE 10 MG/1
TABLET ORAL
Qty: 90 TABLET | Refills: 0 | Status: SHIPPED | OUTPATIENT
Start: 2021-07-14 | End: 2021-10-29

## 2021-07-30 DIAGNOSIS — G47.00 INSOMNIA, UNSPECIFIED TYPE: ICD-10-CM

## 2021-07-30 RX ORDER — TRAZODONE HYDROCHLORIDE 50 MG/1
TABLET ORAL
Qty: 90 TABLET | Refills: 0 | Status: SHIPPED | OUTPATIENT
Start: 2021-07-30 | End: 2021-11-03

## 2021-11-03 DIAGNOSIS — G47.00 INSOMNIA, UNSPECIFIED TYPE: ICD-10-CM

## 2021-11-03 RX ORDER — TRAZODONE HYDROCHLORIDE 50 MG/1
TABLET ORAL
Qty: 90 TABLET | Refills: 0 | Status: SHIPPED | OUTPATIENT
Start: 2021-11-03 | End: 2022-01-28

## 2021-11-23 DIAGNOSIS — I25.119 ATHEROSCLEROSIS OF NATIVE CORONARY ARTERY OF NATIVE HEART WITH ANGINA PECTORIS (HCC): ICD-10-CM

## 2021-11-23 RX ORDER — ISOSORBIDE MONONITRATE 60 MG/1
90 TABLET, EXTENDED RELEASE ORAL DAILY
Qty: 135 TABLET | Refills: 1 | Status: SHIPPED | OUTPATIENT
Start: 2021-11-23 | End: 2022-02-02 | Stop reason: SDUPTHER

## 2021-12-05 DIAGNOSIS — E78.2 MIXED HYPERLIPIDEMIA: ICD-10-CM

## 2021-12-05 DIAGNOSIS — I25.10 CORONARY ARTERY DISEASE INVOLVING NATIVE HEART WITHOUT ANGINA PECTORIS, UNSPECIFIED VESSEL OR LESION TYPE: ICD-10-CM

## 2021-12-06 RX ORDER — METOPROLOL SUCCINATE 100 MG/1
TABLET, EXTENDED RELEASE ORAL
Qty: 90 TABLET | Refills: 1 | Status: SHIPPED | OUTPATIENT
Start: 2021-12-06 | End: 2022-05-31

## 2021-12-06 RX ORDER — PRAVASTATIN SODIUM 40 MG
TABLET ORAL
Qty: 90 TABLET | Refills: 1 | Status: SHIPPED | OUTPATIENT
Start: 2021-12-06 | End: 2022-05-31

## 2022-01-17 ENCOUNTER — TELEPHONE (OUTPATIENT)
Dept: FAMILY MEDICINE CLINIC | Facility: CLINIC | Age: 62
End: 2022-01-17

## 2022-01-17 DIAGNOSIS — R63.4 WEIGHT LOSS: ICD-10-CM

## 2022-01-17 DIAGNOSIS — E78.2 MIXED HYPERLIPIDEMIA: ICD-10-CM

## 2022-01-17 DIAGNOSIS — R73.03 PREDIABETES: ICD-10-CM

## 2022-01-17 DIAGNOSIS — I10 ESSENTIAL HYPERTENSION: ICD-10-CM

## 2022-01-17 DIAGNOSIS — I25.708 CORONARY ARTERY DISEASE OF BYPASS GRAFT OF NATIVE HEART WITH STABLE ANGINA PECTORIS (HCC): Primary | ICD-10-CM

## 2022-01-17 NOTE — TELEPHONE ENCOUNTER
Called patient to schedule routine fup  Stated he's lost weight, worried he may have had covid or has cancer  Wants routine labs done before appt  Please advise  Patient didn't schedule

## 2022-01-22 ENCOUNTER — APPOINTMENT (OUTPATIENT)
Dept: LAB | Facility: HOSPITAL | Age: 62
End: 2022-01-22
Payer: COMMERCIAL

## 2022-01-22 LAB
ALBUMIN SERPL BCP-MCNC: 4.4 G/DL (ref 3.5–5)
ALP SERPL-CCNC: 47 U/L (ref 46–116)
ALT SERPL W P-5'-P-CCNC: 33 U/L (ref 12–78)
ANION GAP SERPL CALCULATED.3IONS-SCNC: 7 MMOL/L (ref 4–13)
AST SERPL W P-5'-P-CCNC: 24 U/L (ref 5–45)
BASOPHILS # BLD AUTO: 0.05 THOUSANDS/ΜL (ref 0–0.1)
BASOPHILS NFR BLD AUTO: 1 % (ref 0–1)
BILIRUB SERPL-MCNC: 0.63 MG/DL (ref 0.2–1)
BUN SERPL-MCNC: 18 MG/DL (ref 5–25)
CALCIUM SERPL-MCNC: 9 MG/DL (ref 8.3–10.1)
CHLORIDE SERPL-SCNC: 106 MMOL/L (ref 100–108)
CHOLEST SERPL-MCNC: 93 MG/DL
CO2 SERPL-SCNC: 28 MMOL/L (ref 21–32)
CREAT SERPL-MCNC: 0.94 MG/DL (ref 0.6–1.3)
EOSINOPHIL # BLD AUTO: 0.07 THOUSAND/ΜL (ref 0–0.61)
EOSINOPHIL NFR BLD AUTO: 1 % (ref 0–6)
ERYTHROCYTE [DISTWIDTH] IN BLOOD BY AUTOMATED COUNT: 12.6 % (ref 11.6–15.1)
GFR SERPL CREATININE-BSD FRML MDRD: 87 ML/MIN/1.73SQ M
GLUCOSE P FAST SERPL-MCNC: 108 MG/DL (ref 65–99)
HCT VFR BLD AUTO: 43.2 % (ref 36.5–49.3)
HDLC SERPL-MCNC: 25 MG/DL
HGB BLD-MCNC: 14.3 G/DL (ref 12–17)
IMM GRANULOCYTES # BLD AUTO: 0.04 THOUSAND/UL (ref 0–0.2)
IMM GRANULOCYTES NFR BLD AUTO: 1 % (ref 0–2)
LDLC SERPL CALC-MCNC: 46 MG/DL (ref 0–100)
LYMPHOCYTES # BLD AUTO: 1.72 THOUSANDS/ΜL (ref 0.6–4.47)
LYMPHOCYTES NFR BLD AUTO: 20 % (ref 14–44)
MCH RBC QN AUTO: 28.5 PG (ref 26.8–34.3)
MCHC RBC AUTO-ENTMCNC: 33.1 G/DL (ref 31.4–37.4)
MCV RBC AUTO: 86 FL (ref 82–98)
MONOCYTES # BLD AUTO: 0.74 THOUSAND/ΜL (ref 0.17–1.22)
MONOCYTES NFR BLD AUTO: 9 % (ref 4–12)
NEUTROPHILS # BLD AUTO: 5.9 THOUSANDS/ΜL (ref 1.85–7.62)
NEUTS SEG NFR BLD AUTO: 68 % (ref 43–75)
NONHDLC SERPL-MCNC: 68 MG/DL
NRBC BLD AUTO-RTO: 0 /100 WBCS
PLATELET # BLD AUTO: 246 THOUSANDS/UL (ref 149–390)
PMV BLD AUTO: 9.3 FL (ref 8.9–12.7)
POTASSIUM SERPL-SCNC: 4.3 MMOL/L (ref 3.5–5.3)
PROT SERPL-MCNC: 7.3 G/DL (ref 6.4–8.2)
RBC # BLD AUTO: 5.01 MILLION/UL (ref 3.88–5.62)
SODIUM SERPL-SCNC: 141 MMOL/L (ref 136–145)
TRIGL SERPL-MCNC: 110 MG/DL
TSH SERPL DL<=0.05 MIU/L-ACNC: 1.31 UIU/ML (ref 0.36–3.74)
WBC # BLD AUTO: 8.52 THOUSAND/UL (ref 4.31–10.16)

## 2022-01-22 PROCEDURE — 84443 ASSAY THYROID STIM HORMONE: CPT

## 2022-01-22 PROCEDURE — 83036 HEMOGLOBIN GLYCOSYLATED A1C: CPT

## 2022-01-22 PROCEDURE — 36415 COLL VENOUS BLD VENIPUNCTURE: CPT

## 2022-01-22 PROCEDURE — 80053 COMPREHEN METABOLIC PANEL: CPT

## 2022-01-22 PROCEDURE — 80061 LIPID PANEL: CPT

## 2022-01-22 PROCEDURE — 85025 COMPLETE CBC W/AUTO DIFF WBC: CPT

## 2022-01-23 LAB
EST. AVERAGE GLUCOSE BLD GHB EST-MCNC: 108 MG/DL
HBA1C MFR BLD: 5.4 %

## 2022-01-27 DIAGNOSIS — E78.2 MIXED HYPERLIPIDEMIA: ICD-10-CM

## 2022-01-27 DIAGNOSIS — F32.A DEPRESSION, UNSPECIFIED DEPRESSION TYPE: ICD-10-CM

## 2022-01-27 RX ORDER — ESCITALOPRAM OXALATE 10 MG/1
TABLET ORAL
Qty: 90 TABLET | Refills: 0 | Status: SHIPPED | OUTPATIENT
Start: 2022-01-27 | End: 2022-04-20

## 2022-01-27 RX ORDER — FENOFIBRATE 145 MG/1
TABLET, COATED ORAL
Qty: 90 TABLET | Refills: 0 | Status: SHIPPED | OUTPATIENT
Start: 2022-01-27 | End: 2022-04-20

## 2022-01-28 DIAGNOSIS — G47.00 INSOMNIA, UNSPECIFIED TYPE: ICD-10-CM

## 2022-01-28 RX ORDER — TRAZODONE HYDROCHLORIDE 50 MG/1
TABLET ORAL
Qty: 90 TABLET | Refills: 0 | Status: SHIPPED | OUTPATIENT
Start: 2022-01-28 | End: 2022-04-20

## 2022-02-04 ENCOUNTER — OFFICE VISIT (OUTPATIENT)
Dept: FAMILY MEDICINE CLINIC | Facility: CLINIC | Age: 62
End: 2022-02-04
Payer: COMMERCIAL

## 2022-02-04 VITALS
SYSTOLIC BLOOD PRESSURE: 138 MMHG | HEART RATE: 49 BPM | OXYGEN SATURATION: 97 % | BODY MASS INDEX: 28.42 KG/M2 | HEIGHT: 66 IN | DIASTOLIC BLOOD PRESSURE: 84 MMHG | WEIGHT: 176.8 LBS | TEMPERATURE: 97.9 F

## 2022-02-04 DIAGNOSIS — F33.0 DEPRESSION, MAJOR, RECURRENT, MILD (HCC): ICD-10-CM

## 2022-02-04 DIAGNOSIS — E78.2 MIXED HYPERLIPIDEMIA: ICD-10-CM

## 2022-02-04 DIAGNOSIS — Z12.5 SCREENING FOR PROSTATE CANCER: ICD-10-CM

## 2022-02-04 DIAGNOSIS — F41.9 ANXIETY: ICD-10-CM

## 2022-02-04 DIAGNOSIS — I25.708 CORONARY ARTERY DISEASE OF BYPASS GRAFT OF NATIVE HEART WITH STABLE ANGINA PECTORIS (HCC): Primary | ICD-10-CM

## 2022-02-04 DIAGNOSIS — R73.03 PREDIABETES: ICD-10-CM

## 2022-02-04 DIAGNOSIS — G47.00 INSOMNIA, UNSPECIFIED TYPE: ICD-10-CM

## 2022-02-04 PROCEDURE — 3008F BODY MASS INDEX DOCD: CPT | Performed by: PHYSICIAN ASSISTANT

## 2022-02-04 PROCEDURE — 99214 OFFICE O/P EST MOD 30 MIN: CPT | Performed by: PHYSICIAN ASSISTANT

## 2022-02-04 PROCEDURE — 1036F TOBACCO NON-USER: CPT | Performed by: PHYSICIAN ASSISTANT

## 2022-02-04 NOTE — PROGRESS NOTES
Assessment/Plan:     Diagnoses and all orders for this visit:    Coronary artery disease of bypass graft of native heart with stable angina pectoris [facility])  Comments:  Patient is currently stable  Continue current regimen  Follow closely with Cardiology    Mixed hyperlipidemia  Comments:  Continue fenofibrate and statin therapy  Patient has low HDL syndrome  Orders:  -     Comprehensive metabolic panel; Future  -     Lipid panel; Future    Prediabetes  Comments: This has resolved with diet    Anxiety  Comments:  Continue p r n  Lorazepam    Insomnia, unspecified type  Comments:  Trazodone 50 mg working well    Depression, major, recurrent, mild (HCC)  Comments: This is currently in remission on Lexapro 10 mg    Screening for prostate cancer  -     PSA, Total Screen; Future          Subjective:      Patient ID: Edgardo Portillo is a 62 y o  male  Patient presents in the office for follow up chronic conditions  Patient has extensive coronary artery disease  He has a history of a CABG and a stent  He is currently on Imdur Plavix and aspirin  Patient states he does have intermittent angina at times  He is being followed closely by Cardiology  His blood pressure is controlled with metoprolol  mg  For lipids he is on fenofibrate and pravastatin  Labs reviewed show normal TSH  HDL is low  Fasting blood sugar 1 await however his A1c was 5 4  CBC was normal   Patient also has anxiety history of depression and insomnia  He uses lorazepam as needed  The Lexapro has his depression in remission  He uses trazodone to sleep    BMI Counseling: Body mass index is 28 54 kg/m²  The BMI is above normal  Nutrition recommendations include decreasing portion sizes, encouraging healthy choices of fruits and vegetables and moderation in carbohydrate intake  Exercise recommendations include exercising 3-5 times per week  Rationale for BMI follow-up plan is due to patient being overweight or obese       The following portions of the patient's history were reviewed and updated as appropriate:   He   Patient Active Problem List    Diagnosis Date Noted    History of coronary artery stent placement 05/04/2021    Unstable angina ([facility] ) 11/12/2020    Essential hypertension 11/12/2020    Benign prostatic hyperplasia with urinary hesitancy 02/07/2020    Prediabetes 11/06/2019    Screening for prostate cancer 09/24/2018    Screen for colon cancer 09/24/2018    Hx of CABG 03/09/2018    Low HDL (under 40) 11/17/2017    Depression, major, recurrent, mild [facility] ) 08/21/2017    Chest pain 08/09/2017    Obesity 03/21/2016    Obstructive sleep apnea 04/14/2014    Mixed hyperlipidemia 08/22/2013    Anxiety 03/01/2012    Coronary artery disease of bypass graft of native heart with stable angina pectoris ([facility] ) 03/01/2012    Esophageal reflux 03/01/2012    Insomnia 03/01/2012     Current Outpatient Medications   Medication Sig Dispense Refill    LORazepam (ATIVAN) 0 5 mg tablet Take 1 tablet (0 5 mg total) by mouth 2 (two) times a day (Patient taking differently: Take 0 5 mg by mouth as needed  ) 60 tablet 0    Ascorbic Acid (VITAMIN C PO) Take by mouth      aspirin 81 mg chewable tablet Chew 81 mg daily      B Complex-C (B-COMPLEX WITH VITAMIN C) tablet Take 1 tablet by mouth daily      clopidogrel (PLAVIX) 75 mg tablet TAKE 1 TABLET DAILY 30 tablet 0    Coenzyme Q10 200 MG capsule Take 200 mg by mouth daily      escitalopram (LEXAPRO) 10 mg tablet TAKE 1 TABLET DAILY 90 tablet 0    fenofibrate (TRICOR) 145 mg tablet TAKE 1 TABLET DAILY 90 tablet 0    isosorbide mononitrate (IMDUR) 60 mg 24 hr tablet Take 1 tablet (60 mg total) by mouth daily 30 tablet 0    metoprolol succinate (TOPROL-XL) 100 mg 24 hr tablet TAKE 1 TABLET DAILY 90 tablet 1    nitroglycerin (NITROSTAT) 0 4 mg SL tablet Place 1 tablet (0 4 mg total) under the tongue every 5 (five) minutes as needed for chest pain 30 tablet 5    Omega-3 Fatty Acids (FISH OIL) 645 MG CAPS Take by mouth Daily      pravastatin (PRAVACHOL) 40 mg tablet TAKE 1 TABLET DAILY AT     BEDTIME 90 tablet 1    traZODone (DESYREL) 50 mg tablet TAKE 1/2 TABLET AT BEDTIME  INCREASE TO 1 TABLET ONCE DAILY AT BED AS NEEDED  90 tablet 0     No current facility-administered medications for this visit  He is allergic to iodinated diagnostic agents, meloxicam, and omnipaque [iohexol]       Review of Systems   Constitutional: Negative for activity change, appetite change, chills, fatigue and fever  HENT: Negative for ear pain and sore throat  Eyes: Negative for visual disturbance  Respiratory: Negative for cough and shortness of breath  Cardiovascular: Positive for chest pain  Negative for palpitations and leg swelling  Gastrointestinal: Negative for abdominal pain, blood in stool, constipation, diarrhea and nausea  Genitourinary: Negative for difficulty urinating  Musculoskeletal: Negative for arthralgias, back pain and myalgias  Skin: Negative for rash  Neurological: Negative for dizziness, syncope and headaches  Psychiatric/Behavioral: Negative for sleep disturbance  Objective:        Physical Exam  Vitals and nursing note reviewed  Constitutional:       Appearance: Normal appearance  He is well-developed  HENT:      Head: Normocephalic and atraumatic  Right Ear: Tympanic membrane, ear canal and external ear normal       Left Ear: Tympanic membrane, ear canal and external ear normal    Eyes:      Conjunctiva/sclera: Conjunctivae normal       Pupils: Pupils are equal, round, and reactive to light  Neck:      Thyroid: No thyromegaly  Vascular: No carotid bruit  Cardiovascular:      Rate and Rhythm: Regular rhythm  Bradycardia present  Heart sounds: Normal heart sounds  No murmur heard  Pulmonary:      Effort: Pulmonary effort is normal       Breath sounds: Normal breath sounds  No wheezing     Abdominal:      General: Bowel sounds are normal  Palpations: Abdomen is soft  There is no mass  Tenderness: There is no abdominal tenderness  Musculoskeletal:      Right lower leg: No edema  Left lower leg: No edema  Lymphadenopathy:      Cervical: No cervical adenopathy  Skin:     General: Skin is warm and dry  Neurological:      General: No focal deficit present  Mental Status: He is alert and oriented to person, place, and time  Psychiatric:         Mood and Affect: Mood normal          Behavior: Behavior normal          Thought Content:  Thought content normal          Judgment: Judgment normal

## 2022-02-05 DIAGNOSIS — I25.10 CORONARY ARTERY DISEASE INVOLVING NATIVE CORONARY ARTERY OF NATIVE HEART WITHOUT ANGINA PECTORIS: ICD-10-CM

## 2022-02-07 RX ORDER — CLOPIDOGREL BISULFATE 75 MG/1
TABLET ORAL
Qty: 30 TABLET | Refills: 0 | Status: SHIPPED | OUTPATIENT
Start: 2022-02-07 | End: 2022-03-07

## 2022-04-20 DIAGNOSIS — E78.2 MIXED HYPERLIPIDEMIA: ICD-10-CM

## 2022-04-20 DIAGNOSIS — G47.00 INSOMNIA, UNSPECIFIED TYPE: ICD-10-CM

## 2022-04-20 DIAGNOSIS — F32.A DEPRESSION, UNSPECIFIED DEPRESSION TYPE: ICD-10-CM

## 2022-04-20 RX ORDER — TRAZODONE HYDROCHLORIDE 50 MG/1
TABLET ORAL
Qty: 90 TABLET | Refills: 0 | Status: SHIPPED | OUTPATIENT
Start: 2022-04-20 | End: 2022-05-10 | Stop reason: SDUPTHER

## 2022-04-20 RX ORDER — ESCITALOPRAM OXALATE 10 MG/1
TABLET ORAL
Qty: 90 TABLET | Refills: 0 | Status: SHIPPED | OUTPATIENT
Start: 2022-04-20 | End: 2022-07-12

## 2022-04-20 RX ORDER — FENOFIBRATE 145 MG/1
TABLET, COATED ORAL
Qty: 90 TABLET | Refills: 0 | Status: SHIPPED | OUTPATIENT
Start: 2022-04-20 | End: 2022-07-12

## 2022-05-10 DIAGNOSIS — G47.00 INSOMNIA, UNSPECIFIED TYPE: ICD-10-CM

## 2022-05-11 RX ORDER — TRAZODONE HYDROCHLORIDE 50 MG/1
50 TABLET ORAL
Qty: 90 TABLET | Refills: 1 | Status: SHIPPED | OUTPATIENT
Start: 2022-05-11

## 2022-05-31 DIAGNOSIS — I25.10 CORONARY ARTERY DISEASE INVOLVING NATIVE HEART WITHOUT ANGINA PECTORIS, UNSPECIFIED VESSEL OR LESION TYPE: ICD-10-CM

## 2022-05-31 DIAGNOSIS — E78.2 MIXED HYPERLIPIDEMIA: ICD-10-CM

## 2022-05-31 RX ORDER — PRAVASTATIN SODIUM 40 MG
TABLET ORAL
Qty: 90 TABLET | Refills: 1 | Status: SHIPPED | OUTPATIENT
Start: 2022-05-31

## 2022-05-31 RX ORDER — METOPROLOL SUCCINATE 100 MG/1
TABLET, EXTENDED RELEASE ORAL
Qty: 90 TABLET | Refills: 1 | Status: SHIPPED | OUTPATIENT
Start: 2022-05-31

## 2022-06-09 PROBLEM — R07.9 CHEST PAIN: Chronic | Status: RESOLVED | Noted: 2017-08-09 | Resolved: 2022-06-09

## 2022-06-10 ENCOUNTER — OFFICE VISIT (OUTPATIENT)
Dept: FAMILY MEDICINE CLINIC | Facility: CLINIC | Age: 62
End: 2022-06-10
Payer: COMMERCIAL

## 2022-06-10 VITALS
WEIGHT: 174.4 LBS | HEART RATE: 59 BPM | TEMPERATURE: 97.1 F | OXYGEN SATURATION: 96 % | HEIGHT: 66 IN | DIASTOLIC BLOOD PRESSURE: 82 MMHG | BODY MASS INDEX: 28.03 KG/M2 | SYSTOLIC BLOOD PRESSURE: 126 MMHG

## 2022-06-10 DIAGNOSIS — F41.9 ANXIETY: ICD-10-CM

## 2022-06-10 DIAGNOSIS — I25.119 ATHEROSCLEROSIS OF NATIVE CORONARY ARTERY OF NATIVE HEART WITH ANGINA PECTORIS (HCC): ICD-10-CM

## 2022-06-10 DIAGNOSIS — Z12.11 SCREENING FOR COLON CANCER: Primary | ICD-10-CM

## 2022-06-10 DIAGNOSIS — J98.01 BRONCHOSPASM, ACUTE: ICD-10-CM

## 2022-06-10 PROCEDURE — 1036F TOBACCO NON-USER: CPT | Performed by: PHYSICIAN ASSISTANT

## 2022-06-10 PROCEDURE — 99213 OFFICE O/P EST LOW 20 MIN: CPT | Performed by: PHYSICIAN ASSISTANT

## 2022-06-10 PROCEDURE — 3725F SCREEN DEPRESSION PERFORMED: CPT | Performed by: PHYSICIAN ASSISTANT

## 2022-06-10 PROCEDURE — 3008F BODY MASS INDEX DOCD: CPT | Performed by: PHYSICIAN ASSISTANT

## 2022-06-10 RX ORDER — AZITHROMYCIN 250 MG/1
TABLET, FILM COATED ORAL
Qty: 6 TABLET | Refills: 0 | Status: SHIPPED | OUTPATIENT
Start: 2022-06-10 | End: 2022-06-15

## 2022-06-10 RX ORDER — ISOSORBIDE MONONITRATE 60 MG/1
60 TABLET, EXTENDED RELEASE ORAL DAILY
Qty: 90 TABLET | Refills: 1 | Status: SHIPPED | OUTPATIENT
Start: 2022-06-10

## 2022-06-10 RX ORDER — LORAZEPAM 0.5 MG/1
0.5 TABLET ORAL 2 TIMES DAILY
Qty: 60 TABLET | Refills: 0 | Status: SHIPPED | OUTPATIENT
Start: 2022-06-10

## 2022-06-10 RX ORDER — PREDNISONE 10 MG/1
10 TABLET ORAL 2 TIMES DAILY WITH MEALS
Qty: 10 TABLET | Refills: 0 | Status: SHIPPED | OUTPATIENT
Start: 2022-06-10

## 2022-06-10 NOTE — PROGRESS NOTES
Assessment/Plan:     Diagnoses and all orders for this visit:    Screening for colon cancer  -     Occult blood 1-3, stool; Future    Bronchospasm, acute  Comments:  P o  Azithromycin and prednisone ordered  Orders:  -     azithromycin (ZITHROMAX) 250 mg tablet; Take 2 tablets today then 1 tablet daily x 4 days  -     predniSONE 10 mg tablet; Take 1 tablet (10 mg total) by mouth 2 (two) times a day with meals    Atherosclerosis of native coronary artery of native heart with angina pectoris (HCC)  -     isosorbide mononitrate (IMDUR) 60 mg 24 hr tablet; Take 1 tablet (60 mg total) by mouth daily    Anxiety  -     LORazepam (ATIVAN) 0 5 mg tablet; Take 1 tablet (0 5 mg total) by mouth 2 (two) times a day        Subjective:      Patient ID: Brice Stockton is a 65 y o  male  Presents in the office with upper respiratory symptoms for several days  Patient states he has a sore throat and a cough  The cough is productive with blood-tinged mucus  His ears were plugged but there got better now  He does have a sore throat  No fever    Patient states his home test was negative      The following portions of the patient's history were reviewed and updated as appropriate:   He   Patient Active Problem List    Diagnosis Date Noted    History of coronary artery stent placement 05/04/2021    Unstable angina ([facility] [address] ) 11/12/2020    Essential hypertension 11/12/2020    Benign prostatic hyperplasia with urinary hesitancy 02/07/2020    Prediabetes 11/06/2019    Screening for prostate cancer 09/24/2018    Screen for colon cancer 09/24/2018    Hx of CABG 03/09/2018    Low HDL (under 40) 11/17/2017    Depression, major, recurrent, mild ([address] ) 08/21/2017    Obesity 03/21/2016    Obstructive sleep apnea 04/14/2014    Mixed hyperlipidemia 08/22/2013    Anxiety 03/01/2012    Coronary artery disease of bypass graft of native heart with stable angina pectoris ([address] ) 03/01/2012    Esophageal reflux 03/01/2012    Insomnia 03/01/2012     Current Outpatient Medications   Medication Sig Dispense Refill    Ascorbic Acid (VITAMIN C PO) Take by mouth      aspirin 81 mg chewable tablet Chew 81 mg daily      azithromycin (ZITHROMAX) 250 mg tablet Take 2 tablets today then 1 tablet daily x 4 days 6 tablet 0    B Complex-C (B-COMPLEX WITH VITAMIN C) tablet Take 1 tablet by mouth daily      clopidogrel (PLAVIX) 75 mg tablet TAKE 1 TABLET DAILY 90 tablet 1    Coenzyme Q10 200 MG capsule Take 200 mg by mouth daily      escitalopram (LEXAPRO) 10 mg tablet TAKE 1 TABLET DAILY 90 tablet 0    fenofibrate (TRICOR) 145 mg tablet TAKE 1 TABLET DAILY 90 tablet 0    isosorbide mononitrate (IMDUR) 60 mg 24 hr tablet Take 1 tablet (60 mg total) by mouth daily 90 tablet 1    LORazepam (ATIVAN) 0 5 mg tablet Take 1 tablet (0 5 mg total) by mouth 2 (two) times a day 60 tablet 0    metoprolol succinate (TOPROL-XL) 100 mg 24 hr tablet TAKE 1 TABLET DAILY 90 tablet 1    nitroglycerin (NITROSTAT) 0 4 mg SL tablet Place 1 tablet (0 4 mg total) under the tongue every 5 (five) minutes as needed for chest pain 30 tablet 5    Omega-3 Fatty Acids (FISH OIL) 645 MG CAPS Take by mouth Daily      pravastatin (PRAVACHOL) 40 mg tablet TAKE 1 TABLET AT BEDTIME 90 tablet 1    predniSONE 10 mg tablet Take 1 tablet (10 mg total) by mouth 2 (two) times a day with meals 10 tablet 0    traZODone (DESYREL) 50 mg tablet Take 1 tablet (50 mg total) by mouth daily at bedtime as needed for sleep 90 tablet 1     No current facility-administered medications for this visit  He is allergic to iodinated diagnostic agents, meloxicam, and omnipaque [iohexol]       Review of Systems   Constitutional: Negative for activity change, appetite change, chills, fatigue and fever  HENT: Positive for sore throat  Negative for congestion, ear pain, postnasal drip, rhinorrhea, sinus pressure, sinus pain and sneezing  Respiratory: Positive for cough      Neurological: Negative for headaches  Objective:        Physical Exam  Vitals and nursing note reviewed  Constitutional:       General: He is not in acute distress  Appearance: Normal appearance  He is well-developed  He is not diaphoretic  HENT:      Head: Normocephalic and atraumatic  Right Ear: Tympanic membrane, ear canal and external ear normal       Left Ear: Tympanic membrane, ear canal and external ear normal       Nose: Nose normal  No rhinorrhea  Right Sinus: No maxillary sinus tenderness or frontal sinus tenderness  Left Sinus: No maxillary sinus tenderness or frontal sinus tenderness  Mouth/Throat:      Pharynx: No oropharyngeal exudate or posterior oropharyngeal erythema  Eyes:      Conjunctiva/sclera: Conjunctivae normal       Pupils: Pupils are equal, round, and reactive to light  Cardiovascular:      Rate and Rhythm: Regular rhythm  Bradycardia present  Heart sounds: Normal heart sounds  No murmur heard  Pulmonary:      Effort: Pulmonary effort is normal  No respiratory distress  Breath sounds: Normal breath sounds  No wheezing or rales  Comments: Dry spasmodic cough  Lymphadenopathy:      Cervical: No cervical adenopathy  Neurological:      General: No focal deficit present  Mental Status: He is alert and oriented to person, place, and time  Psychiatric:         Mood and Affect: Mood normal          Behavior: Behavior normal          Thought Content:  Thought content normal          Judgment: Judgment normal

## 2022-07-12 DIAGNOSIS — F32.A DEPRESSION, UNSPECIFIED DEPRESSION TYPE: ICD-10-CM

## 2022-07-12 DIAGNOSIS — E78.2 MIXED HYPERLIPIDEMIA: ICD-10-CM

## 2022-07-12 RX ORDER — ESCITALOPRAM OXALATE 10 MG/1
TABLET ORAL
Qty: 90 TABLET | Refills: 0 | Status: SHIPPED | OUTPATIENT
Start: 2022-07-12 | End: 2022-10-17

## 2022-07-12 RX ORDER — FENOFIBRATE 145 MG/1
TABLET, COATED ORAL
Qty: 90 TABLET | Refills: 0 | Status: SHIPPED | OUTPATIENT
Start: 2022-07-12 | End: 2022-10-17

## 2022-08-05 ENCOUNTER — APPOINTMENT (OUTPATIENT)
Dept: LAB | Facility: MEDICAL CENTER | Age: 62
End: 2022-08-05
Payer: COMMERCIAL

## 2022-08-05 DIAGNOSIS — E78.2 MIXED HYPERLIPIDEMIA: ICD-10-CM

## 2022-08-05 DIAGNOSIS — Z12.5 SCREENING FOR PROSTATE CANCER: ICD-10-CM

## 2022-08-05 LAB
ALBUMIN SERPL BCP-MCNC: 4.2 G/DL (ref 3.5–5)
ALP SERPL-CCNC: 42 U/L (ref 46–116)
ALT SERPL W P-5'-P-CCNC: 27 U/L (ref 12–78)
ANION GAP SERPL CALCULATED.3IONS-SCNC: 4 MMOL/L (ref 4–13)
AST SERPL W P-5'-P-CCNC: 21 U/L (ref 5–45)
BILIRUB SERPL-MCNC: 0.5 MG/DL (ref 0.2–1)
BUN SERPL-MCNC: 14 MG/DL (ref 5–25)
CALCIUM SERPL-MCNC: 9.7 MG/DL (ref 8.3–10.1)
CHLORIDE SERPL-SCNC: 109 MMOL/L (ref 96–108)
CHOLEST SERPL-MCNC: 82 MG/DL
CO2 SERPL-SCNC: 28 MMOL/L (ref 21–32)
CREAT SERPL-MCNC: 1 MG/DL (ref 0.6–1.3)
GFR SERPL CREATININE-BSD FRML MDRD: 80 ML/MIN/1.73SQ M
GLUCOSE P FAST SERPL-MCNC: 101 MG/DL (ref 65–99)
HDLC SERPL-MCNC: 28 MG/DL
LDLC SERPL CALC-MCNC: 34 MG/DL (ref 0–100)
NONHDLC SERPL-MCNC: 54 MG/DL
POTASSIUM SERPL-SCNC: 4.3 MMOL/L (ref 3.5–5.3)
PROT SERPL-MCNC: 7.2 G/DL (ref 6.4–8.4)
PSA SERPL-MCNC: 0.4 NG/ML (ref 0–4)
SODIUM SERPL-SCNC: 141 MMOL/L (ref 135–147)
TRIGL SERPL-MCNC: 99 MG/DL

## 2022-08-05 PROCEDURE — G0103 PSA SCREENING: HCPCS

## 2022-08-05 PROCEDURE — 36415 COLL VENOUS BLD VENIPUNCTURE: CPT

## 2022-08-05 PROCEDURE — 80053 COMPREHEN METABOLIC PANEL: CPT

## 2022-08-05 PROCEDURE — 80061 LIPID PANEL: CPT

## 2022-08-27 DIAGNOSIS — I25.10 CORONARY ARTERY DISEASE INVOLVING NATIVE CORONARY ARTERY OF NATIVE HEART WITHOUT ANGINA PECTORIS: ICD-10-CM

## 2022-08-29 RX ORDER — CLOPIDOGREL BISULFATE 75 MG/1
TABLET ORAL
Qty: 90 TABLET | Refills: 0 | Status: SHIPPED | OUTPATIENT
Start: 2022-08-29

## 2022-09-06 ENCOUNTER — APPOINTMENT (EMERGENCY)
Dept: CT IMAGING | Facility: HOSPITAL | Age: 62
End: 2022-09-06
Payer: OTHER MISCELLANEOUS

## 2022-09-06 ENCOUNTER — HOSPITAL ENCOUNTER (EMERGENCY)
Facility: HOSPITAL | Age: 62
Discharge: HOME | End: 2022-09-06
Attending: EMERGENCY MEDICINE
Payer: OTHER MISCELLANEOUS

## 2022-09-06 VITALS
TEMPERATURE: 98.4 F | DIASTOLIC BLOOD PRESSURE: 78 MMHG | HEART RATE: 68 BPM | SYSTOLIC BLOOD PRESSURE: 130 MMHG | RESPIRATION RATE: 13 BRPM | OXYGEN SATURATION: 97 %

## 2022-09-06 DIAGNOSIS — R07.9 CHEST PAIN: ICD-10-CM

## 2022-09-06 DIAGNOSIS — S20.219A CHEST WALL CONTUSION: Primary | ICD-10-CM

## 2022-09-06 LAB
ALBUMIN SERPL BCP-MCNC: 4.2 G/DL (ref 3.5–5)
ALP SERPL-CCNC: 53 U/L (ref 46–116)
ALT SERPL W P-5'-P-CCNC: 16 U/L (ref 12–78)
ANION GAP SERPL CALCULATED.3IONS-SCNC: 8 MMOL/L (ref 4–13)
AST SERPL W P-5'-P-CCNC: 24 U/L (ref 5–45)
ATRIAL RATE: 50 BPM
BASOPHILS # BLD AUTO: 0.04 THOUSANDS/ΜL (ref 0–0.1)
BASOPHILS NFR BLD AUTO: 1 % (ref 0–1)
BILIRUB SERPL-MCNC: 0.33 MG/DL (ref 0.2–1)
BUN SERPL-MCNC: 20 MG/DL (ref 5–25)
CALCIUM SERPL-MCNC: 9.1 MG/DL (ref 8.3–10.1)
CARDIAC TROPONIN I PNL SERPL HS: 3 NG/L
CHLORIDE SERPL-SCNC: 100 MMOL/L (ref 96–108)
CO2 SERPL-SCNC: 29 MMOL/L (ref 21–32)
CREAT SERPL-MCNC: 0.94 MG/DL (ref 0.6–1.3)
EOSINOPHIL # BLD AUTO: 0.11 THOUSAND/ΜL (ref 0–0.61)
EOSINOPHIL NFR BLD AUTO: 2 % (ref 0–6)
ERYTHROCYTE [DISTWIDTH] IN BLOOD BY AUTOMATED COUNT: 12.6 % (ref 11.6–15.1)
GFR SERPL CREATININE-BSD FRML MDRD: 87 ML/MIN/1.73SQ M
GLUCOSE SERPL-MCNC: 86 MG/DL (ref 65–140)
HCT VFR BLD AUTO: 39.6 % (ref 36.5–49.3)
HGB BLD-MCNC: 13.1 G/DL (ref 12–17)
IMM GRANULOCYTES # BLD AUTO: 0.03 THOUSAND/UL (ref 0–0.2)
IMM GRANULOCYTES NFR BLD AUTO: 0 % (ref 0–2)
LYMPHOCYTES # BLD AUTO: 2.21 THOUSANDS/ΜL (ref 0.6–4.47)
LYMPHOCYTES NFR BLD AUTO: 31 % (ref 14–44)
MCH RBC QN AUTO: 29 PG (ref 26.8–34.3)
MCHC RBC AUTO-ENTMCNC: 33.1 G/DL (ref 31.4–37.4)
MCV RBC AUTO: 88 FL (ref 82–98)
MONOCYTES # BLD AUTO: 0.67 THOUSAND/ΜL (ref 0.17–1.22)
MONOCYTES NFR BLD AUTO: 9 % (ref 4–12)
NEUTROPHILS # BLD AUTO: 4.14 THOUSANDS/ΜL (ref 1.85–7.62)
NEUTS SEG NFR BLD AUTO: 57 % (ref 43–75)
NRBC BLD AUTO-RTO: 0 /100 WBCS
P AXIS: 30 DEGREES
PLATELET # BLD AUTO: 219 THOUSANDS/UL (ref 149–390)
PMV BLD AUTO: 9.4 FL (ref 8.9–12.7)
POTASSIUM SERPL-SCNC: 4.1 MMOL/L (ref 3.5–5.3)
PR INTERVAL: 188 MS
PROT SERPL-MCNC: 7 G/DL (ref 6.4–8.4)
QRS AXIS: -71 DEGREES
QRSD INTERVAL: 98 MS
QT INTERVAL: 470 MS
QTC INTERVAL: 428 MS
RBC # BLD AUTO: 4.52 MILLION/UL (ref 3.88–5.62)
SODIUM SERPL-SCNC: 137 MMOL/L (ref 135–147)
T WAVE AXIS: 104 DEGREES
VENTRICULAR RATE: 50 BPM
WBC # BLD AUTO: 7.2 THOUSAND/UL (ref 4.31–10.16)

## 2022-09-06 PROCEDURE — 80053 COMPREHEN METABOLIC PANEL: CPT | Performed by: EMERGENCY MEDICINE

## 2022-09-06 PROCEDURE — 85025 COMPLETE CBC W/AUTO DIFF WBC: CPT | Performed by: EMERGENCY MEDICINE

## 2022-09-06 PROCEDURE — 99284 EMERGENCY DEPT VISIT MOD MDM: CPT | Performed by: EMERGENCY MEDICINE

## 2022-09-06 PROCEDURE — 36415 COLL VENOUS BLD VENIPUNCTURE: CPT | Performed by: EMERGENCY MEDICINE

## 2022-09-06 PROCEDURE — 93010 ELECTROCARDIOGRAM REPORT: CPT | Performed by: INTERNAL MEDICINE

## 2022-09-06 PROCEDURE — 99284 EMERGENCY DEPT VISIT MOD MDM: CPT

## 2022-09-06 PROCEDURE — 84484 ASSAY OF TROPONIN QUANT: CPT | Performed by: EMERGENCY MEDICINE

## 2022-09-06 PROCEDURE — 93005 ELECTROCARDIOGRAM TRACING: CPT

## 2022-09-06 PROCEDURE — 71250 CT THORAX DX C-: CPT

## 2022-09-06 RX ORDER — LIDOCAINE 50 MG/G
1 PATCH TOPICAL ONCE
Status: DISCONTINUED | OUTPATIENT
Start: 2022-09-06 | End: 2022-09-06 | Stop reason: HOSPADM

## 2022-09-06 RX ORDER — SODIUM CHLORIDE 9 MG/ML
3 INJECTION INTRAVENOUS
Status: DISCONTINUED | OUTPATIENT
Start: 2022-09-06 | End: 2022-09-06 | Stop reason: HOSPADM

## 2022-09-06 RX ORDER — ACETAMINOPHEN 325 MG/1
975 TABLET ORAL ONCE
Status: COMPLETED | OUTPATIENT
Start: 2022-09-06 | End: 2022-09-06

## 2022-09-06 RX ADMIN — LIDOCAINE 5% 1 PATCH: 700 PATCH TOPICAL at 05:56

## 2022-09-06 RX ADMIN — ACETAMINOPHEN 975 MG: 325 TABLET ORAL at 05:56

## 2022-09-06 NOTE — DISCHARGE INSTRUCTIONS
CT IMPRESSION:     No acute pathology in the chest      Incidental finding of cholelithiasis and nonobstructing left renal calculi

## 2022-09-06 NOTE — ED PROVIDER NOTES
History  Chief Complaint   Patient presents with    Rib Injury     Pt c/o of 10/10 right sided rib pain nursing home down from injury that occurred 1 week ago, small bruise noted  Pt states he was at work and leaned over onto a part of a newspaper press and felt his rib roll outward     HPI  65 y o  male presents with one week of "rib pain," describing right lower chest pain that started after bending over into a large roll of newsprint while at work  Patient describes severe pain that has been constant, worsening and continues in the ER  Patient states work worsens the pain and nothing improves the pain  Patient denies pleuritic component to chest pain  Patient denies exertional component to the chest pain  Patient denies falling  Patient denies striking his head or any loss of consciousness  Patient states he bent over into a large 500 pound of the newsprint and had immediate pain along with a contusion in the area  Patient notes the pain has been persistent since then and worsening  Patient denies fever/chills, nausea/vomiting, diaphoresis, dyspnea, cough, hemoptysis, weakness, dizziness, back pain, syncope, focal weakness or numbness, leg pain or swelling  All other review of systems reviewed and noted to be negative  The patient affirms a history of atherosclerotic disease (CAD/TIA/CVA/PAD)  Patient has a CABG  Patient denies any immobilization of at least 3 days or surgery in the past 4 weeks  Patient denies any history of DVT or PE  Patient denies any history or family history of thrombophilia  Patient denies any malignancy with treatment within the past 6 months  Focused Objective  CV:  Normal inspection with no rash or signs of infection  Contusion noted in the right lower chest   Regular rate and rhythm  No murmur  Peripheral pulses intact and equal   Tenderness to palpitation over area of patient's reported pain in the area of the contusion  No crepitus    Respiratory:  Lungs clear to auscultation bilaterally without adventitious sounds  Skin:  Warm and dry  No rash or signs of herpes zoster over area of patient's reported pain  Extremities:  Non-tender lower extremities without asymmetry; no clinical signs of DVT  No lower extremity edema  Medical Decision Making    Patient presenting with chest pain with a broad differential, including multiple emergent etiologies  Patient presenting with pain following trauma, notes immediate pain in the area following the incident concerning for traumatic mechanism  Fortunately, no crepitus and patient's pulse oximetry is normal     Considering patient's extensive cardiac history, further evaluation regarding potential alternative etiologies is warranted    EKG obtained and reviewed independently by myself, which was interpreted by myself as listed under ED course  Patient placed on cardiac monitoring  Regarding the possibility for ACS, patient's risk stratification by the HEART SCORE:    HEART score:    History 0=Slightly or non-suspicious  ECG 1=Nonspecific repolarization disturbance  Age 1= > 45 - <65 years  Risk Factors 2= > 3 risk factors, or history of atherosclerotic disease  Troponin 0= Less than or equal to 12 ng/L  Total 4       The patient's HEART score is 4  Laboratory analysis including troponin to evaluate for ACS, CBC to evaluate for anemia or leukocytosis, and BMP to evaluate renal function and electrolytes considering possibility of cardiac involvement  During traumatic mechanism, obtain CT imaging of patient's chest to evaluate for intrathoracic injury  Patient does have a contrast allergy however considering symptoms have been ongoing for a week, noncontrast imaging likely will show any significant findings and will be adequate for evaluation of patient's ribs      Patient on continuous cardiac monitoring and has been instructed to inform nursing with any change in the character or severity of their chest pain so repeat EKG can be obtained  Will monitor patient, reassess and re-evaluate  Patient states he is unable to take anti-inflammatory medications though he is unclear as to the reason, he states he prefers acetaminophen  History provided by:  Patient  Chest Pain  Pain location:  R chest  Pain quality: aching    Pain radiates to:  Does not radiate  Pain severity:  Severe  Onset quality:  Sudden  Timing:  Constant  Progression:  Unchanged  Chronicity:  New  Relieved by:  Nothing  Associated symptoms: no abdominal pain, no altered mental status, no back pain, no claudication, no cough, no diaphoresis, no dizziness, no dysphagia, no fatigue, no fever, no headache, no lower extremity edema, no nausea, no near-syncope, no numbness, no orthopnea, no palpitations, no shortness of breath, no syncope, not vomiting and no weakness    Risk factors: coronary artery disease        Prior to Admission Medications   Prescriptions Last Dose Informant Patient Reported? Taking?    Ascorbic Acid (VITAMIN C PO)  Self Yes No   Sig: Take by mouth   B Complex-C (B-COMPLEX WITH VITAMIN C) tablet  Self Yes No   Sig: Take 1 tablet by mouth daily   Coenzyme Q10 200 MG capsule  Self Yes No   Sig: Take 200 mg by mouth daily   LORazepam (ATIVAN) 0 5 mg tablet   No No   Sig: Take 1 tablet (0 5 mg total) by mouth 2 (two) times a day   Omega-3 Fatty Acids (FISH OIL) 645 MG CAPS  Self Yes No   Sig: Take by mouth Daily   aspirin 81 mg chewable tablet  Self Yes No   Sig: Chew 81 mg daily   clopidogrel (PLAVIX) 75 mg tablet   No No   Sig: TAKE 1 TABLET DAILY   escitalopram (LEXAPRO) 10 mg tablet   No No   Sig: TAKE 1 TABLET DAILY   fenofibrate (TRICOR) 145 mg tablet   No No   Sig: TAKE 1 TABLET DAILY   isosorbide mononitrate (IMDUR) 60 mg 24 hr tablet   No No   Sig: Take 1 tablet (60 mg total) by mouth daily   metoprolol succinate (TOPROL-XL) 100 mg 24 hr tablet   No No   Sig: TAKE 1 TABLET DAILY   nitroglycerin (NITROSTAT) 0 4 mg SL tablet  Self No No   Sig: Place 1 tablet (0 4 mg total) under the tongue every 5 (five) minutes as needed for chest pain   pravastatin (PRAVACHOL) 40 mg tablet   No No   Sig: TAKE 1 TABLET AT BEDTIME   predniSONE 10 mg tablet   No No   Sig: Take 1 tablet (10 mg total) by mouth 2 (two) times a day with meals   traZODone (DESYREL) 50 mg tablet   No No   Sig: Take 1 tablet (50 mg total) by mouth daily at bedtime as needed for sleep      Facility-Administered Medications: None       Past Medical History:   Diagnosis Date    Anxiety     Coronary artery disease     Depression     Hyperlipidemia     Nephrolithiasis     Rotator cuff tear     Sleep apnea     Subdural hemorrhage-concussion ([facility] [place] [address] )        Past Surgical History:   Procedure Laterality Date    BRAIN SURGERY      CARDIAC CATHETERIZATION      CORONARY ARTERY BYPASS GRAFT      HERNIA REPAIR      INGUINAL HERNIA REPAIR      OTHER SURGICAL HISTORY      cardiac cath procedure outcome - successful     SHOULDER SURGERY      arthroscopy     TONSILLECTOMY      VASECTOMY         Family History   Problem Relation Age of Onset    Diabetes Mother         mellitus     Depression Mother     Stroke Father     Other Father         other lymphatic and hematopoietic neoplasms     Hypertension Family     Cancer Family     Arthritis Family      I have reviewed and agree with the history as documented      E-Cigarette/Vaping    E-Cigarette Use Never User      E-Cigarette/Vaping Substances    Nicotine No     THC No     CBD No     Flavoring No     Other No     Unknown No      Social History     Tobacco Use    Smoking status: Former Smoker     Types: Cigarettes     Quit date:      Years since quittin 6    Smokeless tobacco: Never Used    Tobacco comment: quit about 8 1/2 years ago    Vaping Use    Vaping Use: Never used   Substance Use Topics    Alcohol use: Not Currently     Comment: social     Drug use: Yes     Types: Marijuana     Comment: Few times a week       Review of Systems   Constitutional: Negative for diaphoresis, fatigue and fever  HENT: Negative for trouble swallowing  Respiratory: Negative for cough and shortness of breath  Cardiovascular: Positive for chest pain  Negative for palpitations, orthopnea, claudication, syncope and near-syncope  Gastrointestinal: Negative for abdominal pain, nausea and vomiting  Musculoskeletal: Negative for back pain  Neurological: Negative for dizziness, weakness, numbness and headaches  All other systems reviewed and are negative  Physical Exam  Physical Exam  Vitals reviewed  HENT:      Head: Atraumatic  Eyes:      General: No scleral icterus  Cardiovascular:      Rate and Rhythm: Normal rate and regular rhythm  Pulmonary:      Effort: Pulmonary effort is normal       Comments: Prior well-healed surgical incision  Chest:      Chest wall: Tenderness present  Abdominal:      General: There is no distension  Tenderness: There is no abdominal tenderness  There is no right CVA tenderness, left CVA tenderness, guarding or rebound  Musculoskeletal:         General: No tenderness or deformity  Cervical back: Neck supple  Skin:     General: Skin is warm and dry  Neurological:      General: No focal deficit present  Mental Status: He is alert     Psychiatric:         Mood and Affect: Mood normal          Vital Signs  ED Triage Vitals [09/06/22 0530]   Temperature Pulse Respirations Blood Pressure SpO2   98 4 °F (36 9 °C) 56 16 (!) 172/89 99 %      Temp Source Heart Rate Source Patient Position - Orthostatic VS BP Location FiO2 (%)   Oral Monitor Sitting Left arm --      Pain Score       10 - Worst Possible Pain           Vitals:    09/06/22 0530 09/06/22 0615   BP: (!) 172/89 130/78   Pulse: 56 68   Patient Position - Orthostatic VS: Sitting Sitting         Visual Acuity      ED Medications  Medications   acetaminophen (TYLENOL) tablet 975 mg (975 mg Oral Given 9/6/22 0556) Diagnostic Studies  Results Reviewed     Procedure Component Value Units Date/Time    HS Troponin 0hr (reflex protocol) [[id number]]  (Normal) Collected: 09/06/22 0551    Lab Status: Final result Specimen: Blood from Arm, Left Updated: 09/06/22 0640     hs TnI 0hr 3 ng/L     Comprehensive metabolic panel [[id number]] Collected: 09/06/22 0551    Lab Status: Final result Specimen: Blood from Arm, Left Updated: 09/06/22 0631     Sodium 137 mmol/L      Potassium 4 1 mmol/L      Chloride 100 mmol/L      CO2 29 mmol/L      ANION GAP 8 mmol/L      BUN 20 mg/dL      Creatinine 0 94 mg/dL      Glucose 86 mg/dL      Calcium 9 1 mg/dL      AST 24 U/L      ALT 16 U/L      Alkaline Phosphatase 53 U/L      Total Protein 7 0 g/dL      Albumin 4 2 g/dL      Total Bilirubin 0 33 mg/dL      eGFR 87 ml/min/1 73sq m     Narrative:      National Kidney Disease Foundation guidelines for Chronic Kidney Disease (CKD):     Stage 1 with normal or high GFR (GFR > 90 mL/min/1 73 square meters)    Stage 2 Mild CKD (GFR = 60-89 mL/min/1 73 square meters)    Stage 3A Moderate CKD (GFR = 45-59 mL/min/1 73 square meters)    Stage 3B Moderate CKD (GFR = 30-44 mL/min/1 73 square meters)    Stage 4 Severe CKD (GFR = 15-29 mL/min/1 73 square meters)    Stage 5 End Stage CKD (GFR <15 mL/min/1 73 square meters)  Note: GFR calculation is accurate only with a steady state creatinine    CBC and differential [[id number]] Collected: 09/06/22 0551    Lab Status: Final result Specimen: Blood from Arm, Left Updated: 09/06/22 0605     WBC 7 20 Thousand/uL      RBC 4 52 Million/uL      Hemoglobin 13 1 g/dL      Hematocrit 39 6 %      MCV 88 fL      MCH 29 0 pg      MCHC 33 1 g/dL      RDW 12 6 %      MPV 9 4 fL      Platelets 655 Thousands/uL      nRBC 0 /100 WBCs      Neutrophils Relative 57 %      Immat GRANS % 0 %      Lymphocytes Relative 31 %      Monocytes Relative 9 %      Eosinophils Relative 2 %      Basophils Relative 1 %      Neutrophils Absolute 4 14 Thousands/µL      Immature Grans Absolute 0 03 Thousand/uL      Lymphocytes Absolute 2 21 Thousands/µL      Monocytes Absolute 0 67 Thousand/µL      Eosinophils Absolute 0 11 Thousand/µL      Basophils Absolute 0 04 Thousands/µL                  CT chest without contrast   Final Result by Cheng Blankenship MD (09/06 7543)      No acute pathology in the chest       Incidental finding of cholelithiasis and nonobstructing left renal calculi  Workstation performed: [id number]                    Procedures  Procedures         ED Course  ED Course as of 09/07/22 0616   Tue Sep 06, 2022   6773 EKG demonstrates sinus bradycardia rate of 50  There is no ST segment elevation  There is borderline ST depression in V2 with abnormal T-wave morphology though this is similar to prior EKG  Grossly appears similar to prior  0650 hs TnI 0hr: 3  Patient's chest pain has been constant for a week and high sensitivity troponin less than 4, unlikely to represent ACS      1416 Patient's workup without acute findings  Discussed with the patient incidental findings of cholelithiasis and nonobstructing stones  Patient without any abdominal pain or right upper quadrant pain  Patient's laboratory evaluation unremarkable  Discussed potential etiologies with the patient the need for continued follow-up with occupational medicine  Discussed continued symptomatic management  Discussed return precautions in detail                                               MDM    Disposition  Final diagnoses:   Chest wall contusion   Chest pain     Time reflects when diagnosis was documented in both MDM as applicable and the Disposition within this note     Time User Action Codes Description Comment    9/6/2022  6:32 AM Elin Gonzales [[id number]] Chest wall contusion     9/6/2022  6:32 AM Elin Gonzales [R07 [id number]] Chest pain       ED Disposition     ED Disposition   Discharge    Condition   Stable    Date/Time   Tue Sep 6, 2022 6:52 AM    Comment   Torri Palma discharge to home/self care  Follow-up Information     Follow up With Specialties Details Why Contact Info Additional Information      [facility]ational [facility]  Schedule an appointment as soon as possible for a visit today You must schedule follow up with occupational medicine in the next 24 hours regarding continued treatment and return to work  You must follow up with your company's choice of physicians, which may include [address] [phone] [facility] Emergency Department Emergency Medicine Go to  If symptoms worsen [address] [facility] [address] [facility] Emergency Department, [address] [place], [place], [address]          Discharge Medication List as of 9/6/2022  6:52 AM      CONTINUE these medications which have NOT CHANGED    Details   Ascorbic Acid (VITAMIN C PO) Take by mouth, Historical Med      aspirin 81 mg chewable tablet Chew 81 mg daily, Historical Med      B Complex-C (B-COMPLEX WITH VITAMIN C) tablet Take 1 tablet by mouth daily, Historical Med      clopidogrel (PLAVIX) 75 mg tablet TAKE 1 TABLET DAILY, Normal      Coenzyme Q10 200 MG capsule Take 200 mg by mouth daily, Historical Med      escitalopram (LEXAPRO) 10 mg tablet TAKE 1 TABLET DAILY, Normal      fenofibrate (TRICOR) 145 mg tablet TAKE 1 TABLET DAILY, Normal      isosorbide mononitrate (IMDUR) 60 mg 24 hr tablet Take 1 tablet (60 mg total) by mouth daily, Starting Fri 6/10/2022, Normal      LORazepam (ATIVAN) 0 5 mg tablet Take 1 tablet (0 5 mg total) by mouth 2 (two) times a day, Starting Fri 6/10/2022, Normal      metoprolol succinate (TOPROL-XL) 100 mg 24 hr tablet TAKE 1 TABLET DAILY, Normal      nitroglycerin (NITROSTAT) 0 4 mg SL tablet Place 1 tablet (0 4 mg total) under the tongue every 5 (five) minutes as needed for chest pain, Starting Wed 11/18/2020, Normal      Omega-3 Fatty Acids (FISH OIL) 645 MG CAPS Take by mouth Daily, Historical Med      pravastatin (PRAVACHOL) 40 mg tablet TAKE 1 TABLET AT BEDTIME, Normal      predniSONE 10 mg tablet Take 1 tablet (10 mg total) by mouth 2 (two) times a day with meals, Starting Fri 6/10/2022, Normal      traZODone (DESYREL) 50 mg tablet Take 1 tablet (50 mg total) by mouth daily at bedtime as needed for sleep, Starting Wed 5/11/2022, Normal             No discharge procedures on file      PDMP Review       Value Time User    PDMP Reviewed  Yes 6/10/2022  9:20 AM Go Bullock PA-C          ED Provider  Electronically Signed by           Gus Swann MD  09/07/22 2974

## 2022-09-06 NOTE — Clinical Note
Yan Slaughter was seen and treated in our emergency department on 9/6/2022  Occupational Medicine Follow Up Within 24 hours            Diagnosis:     Isac Moore  may return to work on return date  He may return on this date: 09/07/2022         If you have any questions or concerns, please don't hesitate to call        Sabrina Leary MD    ______________________________           _______________          _______________  Hospital Representative                              Date                                Time

## 2022-10-17 DIAGNOSIS — F32.A DEPRESSION, UNSPECIFIED DEPRESSION TYPE: ICD-10-CM

## 2022-10-17 DIAGNOSIS — G47.00 INSOMNIA, UNSPECIFIED TYPE: ICD-10-CM

## 2022-10-17 DIAGNOSIS — E78.2 MIXED HYPERLIPIDEMIA: ICD-10-CM

## 2022-10-17 RX ORDER — ESCITALOPRAM OXALATE 10 MG/1
TABLET ORAL
Qty: 90 TABLET | Refills: 0 | Status: SHIPPED | OUTPATIENT
Start: 2022-10-17

## 2022-10-17 RX ORDER — TRAZODONE HYDROCHLORIDE 50 MG/1
TABLET ORAL
Qty: 90 TABLET | Refills: 1 | Status: SHIPPED | OUTPATIENT
Start: 2022-10-17

## 2022-10-17 RX ORDER — FENOFIBRATE 145 MG/1
TABLET, COATED ORAL
Qty: 90 TABLET | Refills: 0 | Status: SHIPPED | OUTPATIENT
Start: 2022-10-17

## 2022-10-31 ENCOUNTER — OFFICE VISIT (OUTPATIENT)
Dept: URGENT CARE | Facility: CLINIC | Age: 62
End: 2022-10-31

## 2022-10-31 VITALS — HEART RATE: 63 BPM | OXYGEN SATURATION: 96 % | TEMPERATURE: 98.3 F | RESPIRATION RATE: 20 BRPM

## 2022-10-31 DIAGNOSIS — J98.01 BRONCHOSPASM, ACUTE: ICD-10-CM

## 2022-10-31 DIAGNOSIS — J02.9 SORE THROAT: Primary | ICD-10-CM

## 2022-10-31 LAB — S PYO AG THROAT QL: NEGATIVE

## 2022-10-31 RX ORDER — BENZONATATE 200 MG/1
200 CAPSULE ORAL 3 TIMES DAILY PRN
Qty: 20 CAPSULE | Refills: 0 | Status: SHIPPED | OUTPATIENT
Start: 2022-10-31

## 2022-10-31 RX ORDER — PREDNISONE 10 MG/1
10 TABLET ORAL 2 TIMES DAILY WITH MEALS
Qty: 10 TABLET | Refills: 0 | Status: SHIPPED | OUTPATIENT
Start: 2022-10-31

## 2022-10-31 RX ORDER — AZITHROMYCIN 250 MG/1
TABLET, FILM COATED ORAL
Qty: 6 TABLET | Refills: 0 | Status: SHIPPED | OUTPATIENT
Start: 2022-10-31 | End: 2022-11-04

## 2022-10-31 NOTE — LETTER
October 31, 2022     Patient: Izabel Ledezma   YOB: 1960   Date of Visit: 10/31/2022       To Whom It May Concern: It is my medical opinion that Rosa Isela Goins may return to work on 11/2/2022  If you have any questions or concerns, please don't hesitate to call           Sincerely,        Stefani Tirado MD    CC: No Recipients

## 2022-10-31 NOTE — PATIENT INSTRUCTIONS
Acute Cough   AMBULATORY CARE:   An acute cough  can last up to 3 weeks  Common causes of an acute cough include a cold, allergies, or a lung infection  Seek care immediately if:   You have trouble breathing or feel short of breath  You cough up blood, or you see blood in your mucus  You faint or feel weak or dizzy  You have chest pain when you cough or take a deep breath  You have new wheezing  Contact your healthcare provider if:   You have a fever  Your cough lasts longer than 4 weeks  Your symptoms do not improve with treatment  You have questions or concerns about your condition or care  Treatment:  An acute cough usually goes away on its own  Ask your healthcare provider about medicines you can take to decrease your cough  You may need medicine to stop the cough, decrease swelling in your airways, or help open your airways  Medicine may also be given to help you cough up mucus  If you have an infection caused by bacteria, you may need antibiotics  Manage your symptoms:   Do not smoke and stay away from others who smoke  Nicotine and other chemicals in cigarettes and cigars can cause lung damage and make your cough worse  Ask your healthcare provider for information if you currently smoke and need help to quit  E-cigarettes or smokeless tobacco still contain nicotine  Talk to your healthcare provider before you use these products  Drink extra liquids as directed  Liquids will help thin and loosen mucus so you can cough it up  Liquids will also help prevent dehydration  Examples of good liquids to drink include water, fruit juice, and broth  Do not drink liquids that contain caffeine  Caffeine can increase your risk for dehydration  Ask your healthcare provider how much liquid to drink each day  Rest as directed  Do not do activities that make your cough worse, such as exercise  Use a humidifier or vaporizer    Use a cool mist humidifier or a vaporizer to increase air moisture in your home  This may make it easier for you to breathe and help decrease your cough  Eat 2 to 5 mL of honey 2 times each day  Honey can help thin mucus and decrease your cough  Use cough drops or lozenges  These can help decrease throat irritation and your cough  Follow up with your healthcare provider as directed:  Write down your questions so you remember to ask them during your visits  © Copyright [employer] 2022 Information is for End User's use only and may not be sold, redistributed or otherwise used for commercial purposes  All illustrations and images included in CareNotes® are the copyrighted property of [employer]  or [address] [place], [place] [address]ani joleen   The above information is an  only  It is not intended as medical advice for individual conditions or treatments  Talk to your doctor, nurse or pharmacist before following any medical regimen to see if it is safe and effective for you

## 2022-11-23 DIAGNOSIS — E78.2 MIXED HYPERLIPIDEMIA: ICD-10-CM

## 2022-11-23 DIAGNOSIS — I25.10 CORONARY ARTERY DISEASE INVOLVING NATIVE CORONARY ARTERY OF NATIVE HEART WITHOUT ANGINA PECTORIS: ICD-10-CM

## 2022-11-23 DIAGNOSIS — I25.10 CORONARY ARTERY DISEASE INVOLVING NATIVE HEART WITHOUT ANGINA PECTORIS, UNSPECIFIED VESSEL OR LESION TYPE: ICD-10-CM

## 2022-11-23 DIAGNOSIS — I25.119 ATHEROSCLEROSIS OF NATIVE CORONARY ARTERY OF NATIVE HEART WITH ANGINA PECTORIS (HCC): ICD-10-CM

## 2022-11-23 RX ORDER — ISOSORBIDE MONONITRATE 60 MG/1
TABLET, EXTENDED RELEASE ORAL
Qty: 90 TABLET | Refills: 1 | Status: SHIPPED | OUTPATIENT
Start: 2022-11-23

## 2022-11-23 RX ORDER — METOPROLOL SUCCINATE 100 MG/1
TABLET, EXTENDED RELEASE ORAL
Qty: 90 TABLET | Refills: 1 | Status: SHIPPED | OUTPATIENT
Start: 2022-11-23

## 2022-11-23 RX ORDER — PRAVASTATIN SODIUM 40 MG
TABLET ORAL
Qty: 90 TABLET | Refills: 1 | Status: SHIPPED | OUTPATIENT
Start: 2022-11-23

## 2022-11-23 RX ORDER — CLOPIDOGREL BISULFATE 75 MG/1
TABLET ORAL
Qty: 90 TABLET | Refills: 0 | Status: SHIPPED | OUTPATIENT
Start: 2022-11-23

## 2022-12-11 NOTE — PROGRESS NOTES
[facility]        NAME: Aixa Miller is a 65 y o  male  : 1960    MRN: [id number]  DATE: 2022  TIME: 11:54 AM    Assessment and Plan   Sore throat [J02 9]  1  Sore throat  POCT rapid strepA      2  Bronchospasm, acute  azithromycin (ZITHROMAX) 250 mg tablet    predniSONE 10 mg tablet    benzonatate (TESSALON) 200 MG capsule    P o  Azithromycin and prednisone ordered  Patient Instructions       Follow up with PCP in 3-5 days  Proceed to  ER if symptoms worsen  Chief Complaint     Chief Complaint   Patient presents with   • Cold Like Symptoms     C/o loose stools, cough, chills w/o fever, sore throat, and H/a that started on Friday  States he took a covid test last night and was negative  Taking tylenol  With out relief  Reports symptoms as getting worse  History of Present Illness       Sore Throat   This is a new problem  The current episode started in the past 7 days  The problem has been unchanged  There has been no fever  The pain is at a severity of 5/10  The pain is moderate  Associated symptoms include congestion, coughing, diarrhea and headaches  He has tried nothing for the symptoms  The treatment provided no relief  Review of Systems   Review of Systems   Constitutional: Negative  HENT: Positive for congestion and sore throat  Eyes: Negative  Respiratory: Positive for cough  Gastrointestinal: Positive for diarrhea  Neurological: Positive for headaches  All other systems reviewed and are negative          Current Medications       Current Outpatient Medications:   •  Ascorbic Acid (VITAMIN C PO), Take by mouth, Disp: , Rfl:   •  aspirin 81 mg chewable tablet, Chew 81 mg daily, Disp: , Rfl:   •  B Complex-C (B-COMPLEX WITH VITAMIN C) tablet, Take 1 tablet by mouth daily, Disp: , Rfl:   •  benzonatate (TESSALON) 200 MG capsule, Take 1 capsule (200 mg total) by mouth 3 (three) times a day as needed for cough, Disp: 20 capsule, Rfl: 0  •  Coenzyme Q10 200 MG capsule, Take 200 mg by mouth daily, Disp: , Rfl:   •  escitalopram (LEXAPRO) 10 mg tablet, TAKE 1 TABLET DAILY, Disp: 90 tablet, Rfl: 0  •  fenofibrate (TRICOR) 145 mg tablet, TAKE 1 TABLET DAILY, Disp: 90 tablet, Rfl: 0  •  LORazepam (ATIVAN) 0 5 mg tablet, Take 1 tablet (0 5 mg total) by mouth 2 (two) times a day, Disp: 60 tablet, Rfl: 0  •  nitroglycerin (NITROSTAT) 0 4 mg SL tablet, Place 1 tablet (0 4 mg total) under the tongue every 5 (five) minutes as needed for chest pain, Disp: 30 tablet, Rfl: 5  •  Omega-3 Fatty Acids (FISH OIL) 645 MG CAPS, Take by mouth Daily, Disp: , Rfl:   •  predniSONE 10 mg tablet, Take 1 tablet (10 mg total) by mouth 2 (two) times a day with meals, Disp: 10 tablet, Rfl: 0  •  traZODone (DESYREL) 50 mg tablet, TAKE 1 TABLET DAILY AT     BEDTIME AS NEEDED FOR SLEEP, Disp: 90 tablet, Rfl: 1  •  clopidogrel (PLAVIX) 75 mg tablet, TAKE 1 TABLET DAILY, Disp: 90 tablet, Rfl: 0  •  isosorbide mononitrate (IMDUR) 60 mg 24 hr tablet, TAKE 1 TABLET DAILY, Disp: 90 tablet, Rfl: 1  •  metoprolol succinate (TOPROL-XL) 100 mg 24 hr tablet, TAKE 1 TABLET DAILY, Disp: 90 tablet, Rfl: 1  •  pravastatin (PRAVACHOL) 40 mg tablet, TAKE 1 TABLET AT BEDTIME, Disp: 90 tablet, Rfl: 1    Current Allergies     Allergies as of 10/31/2022 - Reviewed 10/31/2022   Allergen Reaction Noted   • Iodinated diagnostic agents  08/09/2017   • Meloxicam  08/10/2017   • Omnipaque [iohexol]  02/18/2018            The following portions of the patient's history were reviewed and updated as appropriate: allergies, current medications, past family history, past medical history, past social history, past surgical history and problem list      Past Medical History:   Diagnosis Date   • Anxiety    • Coronary artery disease    • Depression    • Hyperlipidemia    • Nephrolithiasis    • Rotator cuff tear    • Sleep apnea    • Subdural hemorrhage-concussion        Past Surgical History:   Procedure Laterality Date   • BRAIN SURGERY     • CARDIAC CATHETERIZATION     • CORONARY ARTERY BYPASS GRAFT     • HERNIA REPAIR     • INGUINAL HERNIA REPAIR     • OTHER SURGICAL HISTORY      cardiac cath procedure outcome - successful    • SHOULDER SURGERY      arthroscopy    • TONSILLECTOMY     • VASECTOMY         Family History   Problem Relation Age of Onset   • Diabetes Mother         mellitus    • Depression Mother    • Stroke Father    • Other Father         other lymphatic and hematopoietic neoplasms    • Hypertension Family    • Cancer Family    • Arthritis Family          Medications have been verified  Objective   Pulse 63   Temp 98 3 °F (36 8 °C)   Resp 20   SpO2 96%        Physical Exam     Physical Exam  Vitals reviewed  Constitutional:       Appearance: Normal appearance  He is well-developed  HENT:      Head: Normocephalic  Right Ear: Tympanic membrane normal       Left Ear: Tympanic membrane normal       Nose: Congestion present  Mouth/Throat:      Pharynx: Uvula midline  Posterior oropharyngeal erythema present  Tonsils: Tonsillar exudate present  2+ on the right  2+ on the left  Eyes:      Pupils: Pupils are equal, round, and reactive to light  Cardiovascular:      Rate and Rhythm: Normal rate and regular rhythm  Pulses: Normal pulses  Heart sounds: Normal heart sounds  Pulmonary:      Effort: Pulmonary effort is normal       Breath sounds: Normal breath sounds  Abdominal:      Palpations: Abdomen is soft  Musculoskeletal:      Cervical back: Normal range of motion  Neurological:      Mental Status: He is alert

## 2023-01-15 DIAGNOSIS — E78.2 MIXED HYPERLIPIDEMIA: ICD-10-CM

## 2023-01-15 DIAGNOSIS — F32.A DEPRESSION, UNSPECIFIED DEPRESSION TYPE: ICD-10-CM

## 2023-01-16 RX ORDER — ESCITALOPRAM OXALATE 10 MG/1
TABLET ORAL
Qty: 90 TABLET | Refills: 0 | Status: SHIPPED | OUTPATIENT
Start: 2023-01-16

## 2023-01-16 RX ORDER — FENOFIBRATE 145 MG/1
TABLET, COATED ORAL
Qty: 90 TABLET | Refills: 0 | Status: SHIPPED | OUTPATIENT
Start: 2023-01-16

## 2023-03-02 ENCOUNTER — OFFICE VISIT (OUTPATIENT)
Dept: FAMILY MEDICINE CLINIC | Facility: CLINIC | Age: 63
End: 2023-03-02

## 2023-03-02 VITALS
SYSTOLIC BLOOD PRESSURE: 135 MMHG | HEIGHT: 66 IN | OXYGEN SATURATION: 96 % | DIASTOLIC BLOOD PRESSURE: 67 MMHG | BODY MASS INDEX: 27.9 KG/M2 | HEART RATE: 52 BPM | TEMPERATURE: 97.4 F | WEIGHT: 173.6 LBS

## 2023-03-02 DIAGNOSIS — I10 ESSENTIAL HYPERTENSION: ICD-10-CM

## 2023-03-02 DIAGNOSIS — G47.00 INSOMNIA, UNSPECIFIED TYPE: ICD-10-CM

## 2023-03-02 DIAGNOSIS — E78.6 LOW HDL (UNDER 40): ICD-10-CM

## 2023-03-02 DIAGNOSIS — I25.708 CORONARY ARTERY DISEASE OF BYPASS GRAFT OF NATIVE HEART WITH STABLE ANGINA PECTORIS (HCC): Primary | ICD-10-CM

## 2023-03-02 DIAGNOSIS — F41.9 ANXIETY: ICD-10-CM

## 2023-03-02 DIAGNOSIS — Z95.1 HX OF CABG: ICD-10-CM

## 2023-03-02 DIAGNOSIS — E78.2 MIXED HYPERLIPIDEMIA: ICD-10-CM

## 2023-03-02 DIAGNOSIS — K21.9 GASTROESOPHAGEAL REFLUX DISEASE WITHOUT ESOPHAGITIS: ICD-10-CM

## 2023-03-02 NOTE — PROGRESS NOTES
Assessment/Plan:     Diagnoses and all orders for this visit:    Coronary artery disease of bypass graft of native heart with stable angina pectoris ([facility] [address] )  Comments:  Stable angina  Refer to cardiology  Orders:  -     Comprehensive metabolic panel  -     Ambulatory Referral to Cardiology; Future    Essential hypertension  Comments:  Pressure is at goal continue Toprol all at   Orders:  -     Comprehensive metabolic panel  -     Ambulatory Referral to Cardiology; Future    Mixed hyperlipidemia  Comments:  Continue statin therapy and fenofibrate  Orders:  -     Lipid panel    Low HDL (under 40)    Gastroesophageal reflux disease without esophagitis  Comments: It is stable    Insomnia, unspecified type  Comments:  Continue as needed trazodone    Anxiety  Comments:  Able on Lexapro    Hx of CABG  -     Ambulatory Referral to Cardiology; Future          Subjective:      Patient ID: Mo Segura is a 65 y o  male  Presents in the office for follow-up chronic conditions  Patient has a history of coronary artery disease  This post CABG  Does have angina  Overdue for cardiology appointment  His Imdur Plavix and aspirin  He does have sublingual nitro to use  Blood pressure is controlled with metoprolol   Pressure and pulse are low  For lipid control he is on pravastatin and fenofibrate therapy  3 of depression anxiety and insomnia  Patient is basically on his Lexapro and taking trazodone at night as needed        The following portions of the patient's history were reviewed and updated as appropriate:   He   Patient Active Problem List    Diagnosis Date Noted   • History of coronary artery stent placement 05/04/2021   • Unstable angina ([facility] [address] ) 11/12/2020   • Essential hypertension 11/12/2020   • Benign prostatic hyperplasia with urinary hesitancy 02/07/2020   • Prediabetes 11/06/2019   • Screening for prostate cancer 09/24/2018   • Screen for colon cancer 09/24/2018   • Hx of CABG 03/09/2018   • Low HDL (under 40) 11/17/2017   • Depression, major, recurrent, mild ([facility] ) 08/21/2017   • Obesity 03/21/2016   • Obstructive sleep apnea 04/14/2014   • Mixed hyperlipidemia 08/22/2013   • Anxiety 03/01/2012   • Coronary artery disease of bypass graft of native heart with stable angina pectoris ([facility] ) 03/01/2012   • Esophageal reflux 03/01/2012   • Insomnia 03/01/2012     Current Outpatient Medications   Medication Sig Dispense Refill   • Ascorbic Acid (VITAMIN C PO) Take by mouth     • aspirin 81 mg chewable tablet Chew 81 mg daily     • B Complex-C (B-COMPLEX WITH VITAMIN C) tablet Take 1 tablet by mouth daily     • clopidogrel (PLAVIX) 75 mg tablet TAKE 1 TABLET DAILY 30 tablet 0   • Coenzyme Q10 200 MG capsule Take 200 mg by mouth daily     • escitalopram (LEXAPRO) 10 mg tablet TAKE 1 TABLET DAILY 90 tablet 0   • fenofibrate (TRICOR) 145 mg tablet TAKE 1 TABLET DAILY 90 tablet 0   • isosorbide mononitrate (IMDUR) 60 mg 24 hr tablet TAKE 1 TABLET DAILY 90 tablet 1   • metoprolol succinate (TOPROL-XL) 100 mg 24 hr tablet TAKE 1 TABLET DAILY 90 tablet 1   • nitroglycerin (NITROSTAT) 0 4 mg SL tablet Place 1 tablet (0 4 mg total) under the tongue every 5 (five) minutes as needed for chest pain 30 tablet 5   • Omega-3 Fatty Acids (FISH OIL) 645 MG CAPS Take by mouth Daily     • pravastatin (PRAVACHOL) 40 mg tablet TAKE 1 TABLET AT BEDTIME 90 tablet 1   • traZODone (DESYREL) 50 mg tablet TAKE 1 TABLET DAILY AT     BEDTIME AS NEEDED FOR SLEEP 90 tablet 1   • LORazepam (ATIVAN) 0 5 mg tablet Take 1 tablet (0 5 mg total) by mouth 2 (two) times a day (Patient not taking: Reported on 3/2/2023) 60 tablet 0     No current facility-administered medications for this visit  He is allergic to iodinated contrast media, meloxicam, and omnipaque [iohexol]       Review of Systems   Constitutional: Negative for activity change, appetite change, chills, fatigue and fever  HENT: Negative for ear pain and sore throat      Eyes: Negative for visual disturbance  Respiratory: Positive for shortness of breath (sob  wiht  exertion)  Negative for cough  Cardiovascular: Positive for chest pain  Negative for palpitations and leg swelling  Gastrointestinal: Negative for abdominal pain, blood in stool, constipation, diarrhea and nausea  Genitourinary: Negative for difficulty urinating  Musculoskeletal: Negative for arthralgias, back pain and myalgias  Skin: Negative for rash  Neurological: Negative for dizziness, syncope and headaches  Psychiatric/Behavioral: Positive for sleep disturbance  The patient is not nervous/anxious  Objective:    BMI Counseling: Body mass index is 28 02 kg/m²  The BMI is above normal  Nutrition recommendations include decreasing portion sizes and moderation in carbohydrate intake  Exercise recommendations include exercising 3-5 times per week  No pharmacotherapy was ordered  Rationale for BMI follow-up plan is due to patient being overweight or obese  Physical Exam  Vitals and nursing note reviewed  Constitutional:       Appearance: Normal appearance  He is well-developed  HENT:      Head: Normocephalic  Comments: Surgical  Scar  Right  parietal  Area  Right Ear: Tympanic membrane, ear canal and external ear normal       Left Ear: Tympanic membrane, ear canal and external ear normal    Eyes:      Conjunctiva/sclera: Conjunctivae normal       Pupils: Pupils are equal, round, and reactive to light  Neck:      Thyroid: No thyromegaly  Vascular: No carotid bruit  Cardiovascular:      Rate and Rhythm: Regular rhythm  Tachycardia present  Heart sounds: Normal heart sounds  No murmur heard  Pulmonary:      Effort: Pulmonary effort is normal       Breath sounds: Normal breath sounds  No wheezing  Abdominal:      General: Bowel sounds are normal       Palpations: Abdomen is soft  There is no mass  Tenderness: There is no abdominal tenderness     Musculoskeletal: Right lower leg: No edema  Left lower leg: No edema  Lymphadenopathy:      Cervical: No cervical adenopathy  Skin:     General: Skin is warm and dry  Neurological:      General: No focal deficit present  Mental Status: He is alert and oriented to person, place, and time  Psychiatric:         Mood and Affect: Mood normal          Behavior: Behavior normal          Thought Content:  Thought content normal          Judgment: Judgment normal

## 2023-03-27 DIAGNOSIS — I25.10 CORONARY ARTERY DISEASE INVOLVING NATIVE CORONARY ARTERY OF NATIVE HEART WITHOUT ANGINA PECTORIS: ICD-10-CM

## 2023-03-27 RX ORDER — CLOPIDOGREL BISULFATE 75 MG/1
TABLET ORAL
Qty: 90 TABLET | Refills: 1 | Status: SHIPPED | OUTPATIENT
Start: 2023-03-27

## 2023-05-03 ENCOUNTER — APPOINTMENT (OUTPATIENT)
Dept: LAB | Facility: HOSPITAL | Age: 63
End: 2023-05-03

## 2023-05-03 DIAGNOSIS — Z12.11 SCREENING FOR COLON CANCER: ICD-10-CM

## 2023-05-03 LAB
ALBUMIN SERPL BCP-MCNC: 4.6 G/DL (ref 3.5–5)
ALP SERPL-CCNC: 42 U/L (ref 34–104)
ALT SERPL W P-5'-P-CCNC: 14 U/L (ref 7–52)
ANION GAP SERPL CALCULATED.3IONS-SCNC: 3 MMOL/L (ref 4–13)
AST SERPL W P-5'-P-CCNC: 20 U/L (ref 13–39)
BILIRUB SERPL-MCNC: 0.54 MG/DL (ref 0.2–1)
BUN SERPL-MCNC: 16 MG/DL (ref 5–25)
CALCIUM SERPL-MCNC: 9.7 MG/DL (ref 8.4–10.2)
CHLORIDE SERPL-SCNC: 105 MMOL/L (ref 96–108)
CHOLEST SERPL-MCNC: 100 MG/DL
CO2 SERPL-SCNC: 29 MMOL/L (ref 21–32)
CREAT SERPL-MCNC: 0.87 MG/DL (ref 0.6–1.3)
GFR SERPL CREATININE-BSD FRML MDRD: 92 ML/MIN/1.73SQ M
GLUCOSE P FAST SERPL-MCNC: 101 MG/DL (ref 65–99)
HDLC SERPL-MCNC: 31 MG/DL
HEMOCCULT STL QL IA: NEGATIVE
LDLC SERPL CALC-MCNC: 47 MG/DL (ref 0–100)
NONHDLC SERPL-MCNC: 69 MG/DL
POTASSIUM SERPL-SCNC: 4.8 MMOL/L (ref 3.5–5.3)
PROT SERPL-MCNC: 6.9 G/DL (ref 6.4–8.4)
SODIUM SERPL-SCNC: 137 MMOL/L (ref 135–147)
TRIGL SERPL-MCNC: 110 MG/DL

## 2023-05-22 DIAGNOSIS — E78.2 MIXED HYPERLIPIDEMIA: ICD-10-CM

## 2023-05-22 DIAGNOSIS — I25.119 ATHEROSCLEROSIS OF NATIVE CORONARY ARTERY OF NATIVE HEART WITH ANGINA PECTORIS (HCC): ICD-10-CM

## 2023-05-22 DIAGNOSIS — I25.10 CORONARY ARTERY DISEASE INVOLVING NATIVE HEART WITHOUT ANGINA PECTORIS, UNSPECIFIED VESSEL OR LESION TYPE: ICD-10-CM

## 2023-05-22 RX ORDER — ISOSORBIDE MONONITRATE 60 MG/1
TABLET, EXTENDED RELEASE ORAL
Qty: 90 TABLET | Refills: 1 | Status: SHIPPED | OUTPATIENT
Start: 2023-05-22

## 2023-05-22 RX ORDER — PRAVASTATIN SODIUM 40 MG
TABLET ORAL
Qty: 90 TABLET | Refills: 1 | Status: SHIPPED | OUTPATIENT
Start: 2023-05-22

## 2023-05-22 RX ORDER — METOPROLOL SUCCINATE 100 MG/1
TABLET, EXTENDED RELEASE ORAL
Qty: 90 TABLET | Refills: 1 | Status: SHIPPED | OUTPATIENT
Start: 2023-05-22

## 2023-09-07 ENCOUNTER — OFFICE VISIT (OUTPATIENT)
Dept: FAMILY MEDICINE CLINIC | Facility: CLINIC | Age: 63
End: 2023-09-07
Payer: COMMERCIAL

## 2023-09-07 VITALS
BODY MASS INDEX: 27.19 KG/M2 | HEART RATE: 46 BPM | SYSTOLIC BLOOD PRESSURE: 126 MMHG | DIASTOLIC BLOOD PRESSURE: 84 MMHG | TEMPERATURE: 98 F | HEIGHT: 66 IN | WEIGHT: 169.2 LBS | OXYGEN SATURATION: 98 %

## 2023-09-07 DIAGNOSIS — I10 ESSENTIAL HYPERTENSION: ICD-10-CM

## 2023-09-07 DIAGNOSIS — K21.9 GASTROESOPHAGEAL REFLUX DISEASE WITHOUT ESOPHAGITIS: ICD-10-CM

## 2023-09-07 DIAGNOSIS — R39.11 BENIGN PROSTATIC HYPERPLASIA WITH URINARY HESITANCY: ICD-10-CM

## 2023-09-07 DIAGNOSIS — I25.708 CORONARY ARTERY DISEASE OF BYPASS GRAFT OF NATIVE HEART WITH STABLE ANGINA PECTORIS (HCC): Primary | ICD-10-CM

## 2023-09-07 DIAGNOSIS — E78.2 MIXED HYPERLIPIDEMIA: ICD-10-CM

## 2023-09-07 DIAGNOSIS — E78.6 LOW HDL (UNDER 40): ICD-10-CM

## 2023-09-07 DIAGNOSIS — G47.00 INSOMNIA, UNSPECIFIED TYPE: ICD-10-CM

## 2023-09-07 DIAGNOSIS — Z12.5 SCREENING FOR PROSTATE CANCER: ICD-10-CM

## 2023-09-07 DIAGNOSIS — N40.1 BENIGN PROSTATIC HYPERPLASIA WITH URINARY HESITANCY: ICD-10-CM

## 2023-09-07 DIAGNOSIS — F41.9 ANXIETY: ICD-10-CM

## 2023-09-07 DIAGNOSIS — R73.03 PREDIABETES: ICD-10-CM

## 2023-09-07 PROCEDURE — 99214 OFFICE O/P EST MOD 30 MIN: CPT | Performed by: PHYSICIAN ASSISTANT

## 2023-09-07 NOTE — PROGRESS NOTES
Assessment/Plan:     Diagnoses and all orders for this visit:    Coronary artery disease of bypass graft of native heart with stable angina pectoris [facility])  Comments:  Patient is currently stable. No angina or signs of congestive heart failure. Follow-up with cardiology as directed  Orders:  -     Comprehensive metabolic panel; Future    Essential hypertension  Comments:  Pressure is at goal continue current regimen  Orders:  -     Comprehensive metabolic panel; Future    Prediabetes  Comments:  Low-carb low sugar diet. Mixed hyperlipidemia  Comments:  Continue statin therapy and low-fat diet  Orders:  -     Lipid panel; Future    Low HDL (under 40)  Comments:  Niacin, fish oil and exercise    Benign prostatic hyperplasia with urinary hesitancy  Comments:  Stable no meds    Gastroesophageal reflux disease without esophagitis  Comments:  GERD is stable off all medication    Anxiety  Comments:  Controlled with Lexapro 10 mg    Insomnia, unspecified type  Comments:  Controlled with as needed trazodone    Screening for prostate cancer  -     PSA, Total Screen; Future          Subjective:      Patient ID: Dean Dorsey is a 59 y.o. male. Patient presents in the office for follow-up chronic conditions. Patient has a history of coronary artery disease. Follows regularly with cardiology. He currently has no angina or signs of congestive heart failure. Is on aspirin 81 mg, Imdur 60 mg, metoprolol  mg and Plavix daily. Blood pressure is well controlled. Has dyslipidemia with low HDL. He is on both pravastatin and fenofibrate therapy. 3 of elevated fasting glucose which is being controlled with diet and exercise. GERD is stable on no medication. BPH is stable on no medication. Has generalized anxiety and insomnia.   He takes Lexapro 10 mg daily and trazodone as needed      The following portions of the patient's history were reviewed and updated as appropriate:   He   Patient Active Problem List Diagnosis Date Noted   • History of coronary artery stent placement 05/04/2021   • Unstable angina ([facility]) 11/12/2020   • Essential hypertension 11/12/2020   • Benign prostatic hyperplasia with urinary hesitancy 02/07/2020   • Prediabetes 11/06/2019   • Screening for prostate cancer 09/24/2018   • Screen for colon cancer 09/24/2018   • Hx of CABG 03/09/2018   • Low HDL (under 40) 11/17/2017   • Depression, major, recurrent, mild ([address]) 08/21/2017   • Obesity 03/21/2016   • Obstructive sleep apnea 04/14/2014   • Mixed hyperlipidemia 08/22/2013   • Anxiety 03/01/2012   • Coronary artery disease of bypass graft of native heart with stable angina pectoris ([facility]) 03/01/2012   • Esophageal reflux 03/01/2012   • Insomnia 03/01/2012     Current Outpatient Medications   Medication Sig Dispense Refill   • Ascorbic Acid (VITAMIN C PO) Take by mouth     • aspirin 81 mg chewable tablet Chew 81 mg daily     • clopidogrel (PLAVIX) 75 mg tablet TAKE 1 TABLET DAILY 90 tablet 1   • Coenzyme Q10 200 MG capsule Take 200 mg by mouth daily     • escitalopram (LEXAPRO) 10 mg tablet TAKE 1 TABLET DAILY 90 tablet 1   • fenofibrate (TRICOR) 145 mg tablet TAKE 1 TABLET DAILY 90 tablet 1   • isosorbide mononitrate (IMDUR) 60 mg 24 hr tablet TAKE 1 TABLET DAILY 90 tablet 1   • metoprolol succinate (TOPROL-XL) 100 mg 24 hr tablet TAKE 1 TABLET DAILY 90 tablet 1   • nitroglycerin (NITROSTAT) 0.4 mg SL tablet Place 1 tablet (0.4 mg total) under the tongue every 5 (five) minutes as needed for chest pain 30 tablet 5   • Omega-3 Fatty Acids (FISH OIL) 645 MG CAPS Take by mouth Daily     • pravastatin (PRAVACHOL) 40 mg tablet TAKE 1 TABLET AT BEDTIME 90 tablet 1   • traZODone (DESYREL) 50 mg tablet TAKE 1 TABLET DAILY AT     BEDTIME AS NEEDED FOR SLEEP 90 tablet 1   • B Complex-C (B-COMPLEX WITH VITAMIN C) tablet Take 1 tablet by mouth daily       No current facility-administered medications for this visit.      He is allergic to iodinated contrast media, meloxicam, and omnipaque [iohexol]. .    Review of Systems   Constitutional: Negative for activity change, appetite change, chills, fatigue and fever. HENT: Negative for ear pain and sore throat. Eyes: Negative for visual disturbance. Respiratory: Negative for cough and shortness of breath. Cardiovascular: Negative for chest pain, palpitations and leg swelling. Gastrointestinal: Negative for abdominal pain, blood in stool, constipation, diarrhea and nausea. Genitourinary: Negative for difficulty urinating. Musculoskeletal: Positive for neck pain. Negative for arthralgias, back pain and myalgias. Skin: Negative for rash. Mole  Left   temple   Neurological: Negative for dizziness, syncope and headaches. Psychiatric/Behavioral: Negative for self-injury, sleep disturbance and suicidal ideas. The patient is not nervous/anxious. Objective:        Physical Exam  Vitals and nursing note reviewed. Constitutional:       General: He is not in acute distress. Appearance: Normal appearance. He is well-developed. He is not ill-appearing. HENT:      Head: Normocephalic. Comments: Craniotomy scar right parietal     Right Ear: Tympanic membrane, ear canal and external ear normal.      Left Ear: Tympanic membrane, ear canal and external ear normal.   Eyes:      Conjunctiva/sclera: Conjunctivae normal.      Pupils: Pupils are equal, round, and reactive to light. Neck:      Thyroid: No thyromegaly. Vascular: No carotid bruit. Cardiovascular:      Rate and Rhythm: Regular rhythm. Bradycardia present. Heart sounds: Normal heart sounds. No murmur heard. Pulmonary:      Effort: Pulmonary effort is normal.      Breath sounds: Normal breath sounds. No wheezing. Abdominal:      General: Bowel sounds are normal.      Palpations: Abdomen is soft. There is no mass. Tenderness: There is no abdominal tenderness. Musculoskeletal:      Right lower leg: No edema.       Left lower leg: No edema. Comments: Cervical spine nontender to palpation. Increased Tatian to the left. No radicular symptoms   Lymphadenopathy:      Cervical: No cervical adenopathy. Skin:     General: Skin is warm and dry. Comments:  Benign  Mole  Left  temple   Neurological:      General: No focal deficit present. Mental Status: He is alert and oriented to person, place, and time. Psychiatric:         Mood and Affect: Mood normal.         Behavior: Behavior normal.         Thought Content:  Thought content normal.         Judgment: Judgment normal.

## 2023-09-14 DIAGNOSIS — I25.10 CORONARY ARTERY DISEASE INVOLVING NATIVE CORONARY ARTERY OF NATIVE HEART WITHOUT ANGINA PECTORIS: ICD-10-CM

## 2023-09-14 RX ORDER — CLOPIDOGREL BISULFATE 75 MG/1
75 TABLET ORAL DAILY
Qty: 90 TABLET | Refills: 0 | Status: SHIPPED | OUTPATIENT
Start: 2023-09-14

## 2023-09-14 RX ORDER — CLOPIDOGREL BISULFATE 75 MG/1
TABLET ORAL
Qty: 30 TABLET | Refills: 0 | Status: SHIPPED | OUTPATIENT
Start: 2023-09-14 | End: 2023-09-14 | Stop reason: SDUPTHER

## 2023-10-05 DIAGNOSIS — G47.00 INSOMNIA, UNSPECIFIED TYPE: ICD-10-CM

## 2023-10-05 DIAGNOSIS — E78.2 MIXED HYPERLIPIDEMIA: ICD-10-CM

## 2023-10-05 DIAGNOSIS — F32.A DEPRESSION, UNSPECIFIED DEPRESSION TYPE: ICD-10-CM

## 2023-10-05 RX ORDER — ESCITALOPRAM OXALATE 10 MG/1
TABLET ORAL
Qty: 90 TABLET | Refills: 1 | Status: SHIPPED | OUTPATIENT
Start: 2023-10-05

## 2023-10-05 RX ORDER — TRAZODONE HYDROCHLORIDE 50 MG/1
TABLET ORAL
Qty: 90 TABLET | Refills: 1 | Status: SHIPPED | OUTPATIENT
Start: 2023-10-05

## 2023-10-05 RX ORDER — FENOFIBRATE 145 MG/1
TABLET, COATED ORAL
Qty: 90 TABLET | Refills: 1 | Status: SHIPPED | OUTPATIENT
Start: 2023-10-05

## 2023-11-11 DIAGNOSIS — I25.119 ATHEROSCLEROSIS OF NATIVE CORONARY ARTERY OF NATIVE HEART WITH ANGINA PECTORIS (HCC): ICD-10-CM

## 2023-11-11 DIAGNOSIS — E78.2 MIXED HYPERLIPIDEMIA: ICD-10-CM

## 2023-11-11 DIAGNOSIS — I25.10 CORONARY ARTERY DISEASE INVOLVING NATIVE HEART WITHOUT ANGINA PECTORIS, UNSPECIFIED VESSEL OR LESION TYPE: ICD-10-CM

## 2023-11-13 RX ORDER — METOPROLOL SUCCINATE 100 MG/1
TABLET, EXTENDED RELEASE ORAL
Qty: 90 TABLET | Refills: 1 | Status: SHIPPED | OUTPATIENT
Start: 2023-11-13

## 2023-11-13 RX ORDER — PRAVASTATIN SODIUM 40 MG
TABLET ORAL
Qty: 90 TABLET | Refills: 1 | Status: SHIPPED | OUTPATIENT
Start: 2023-11-13

## 2023-11-13 RX ORDER — ISOSORBIDE MONONITRATE 60 MG/1
TABLET, EXTENDED RELEASE ORAL
Qty: 90 TABLET | Refills: 1 | Status: SHIPPED | OUTPATIENT
Start: 2023-11-13

## 2023-12-11 ENCOUNTER — APPOINTMENT (OUTPATIENT)
Dept: RADIOLOGY | Facility: MEDICAL CENTER | Age: 63
End: 2023-12-11
Payer: COMMERCIAL

## 2023-12-11 ENCOUNTER — APPOINTMENT (OUTPATIENT)
Dept: LAB | Facility: MEDICAL CENTER | Age: 63
End: 2023-12-11
Payer: COMMERCIAL

## 2023-12-11 ENCOUNTER — PATIENT MESSAGE (OUTPATIENT)
Dept: FAMILY MEDICINE CLINIC | Facility: CLINIC | Age: 63
End: 2023-12-11

## 2023-12-11 ENCOUNTER — OFFICE VISIT (OUTPATIENT)
Dept: FAMILY MEDICINE CLINIC | Facility: CLINIC | Age: 63
End: 2023-12-11
Payer: COMMERCIAL

## 2023-12-11 VITALS
TEMPERATURE: 97.5 F | HEIGHT: 66 IN | HEART RATE: 52 BPM | SYSTOLIC BLOOD PRESSURE: 144 MMHG | BODY MASS INDEX: 26.78 KG/M2 | OXYGEN SATURATION: 99 % | WEIGHT: 166.6 LBS | DIASTOLIC BLOOD PRESSURE: 86 MMHG

## 2023-12-11 DIAGNOSIS — I25.708 CORONARY ARTERY DISEASE OF BYPASS GRAFT OF NATIVE HEART WITH STABLE ANGINA PECTORIS (HCC): ICD-10-CM

## 2023-12-11 DIAGNOSIS — F33.0 DEPRESSION, MAJOR, RECURRENT, MILD (HCC): ICD-10-CM

## 2023-12-11 DIAGNOSIS — R73.03 PREDIABETES: ICD-10-CM

## 2023-12-11 DIAGNOSIS — G47.00 INSOMNIA, UNSPECIFIED TYPE: ICD-10-CM

## 2023-12-11 DIAGNOSIS — I10 ESSENTIAL HYPERTENSION: ICD-10-CM

## 2023-12-11 DIAGNOSIS — Z23 ENCOUNTER FOR IMMUNIZATION: ICD-10-CM

## 2023-12-11 DIAGNOSIS — I25.708 CORONARY ARTERY DISEASE OF BYPASS GRAFT OF NATIVE HEART WITH STABLE ANGINA PECTORIS (HCC): Primary | ICD-10-CM

## 2023-12-11 DIAGNOSIS — K21.9 GASTROESOPHAGEAL REFLUX DISEASE WITHOUT ESOPHAGITIS: ICD-10-CM

## 2023-12-11 DIAGNOSIS — E78.2 MIXED HYPERLIPIDEMIA: ICD-10-CM

## 2023-12-11 DIAGNOSIS — M25.551 PAIN OF RIGHT HIP: Primary | ICD-10-CM

## 2023-12-11 DIAGNOSIS — M25.551 PAIN OF RIGHT HIP: ICD-10-CM

## 2023-12-11 DIAGNOSIS — Z12.5 SCREENING FOR PROSTATE CANCER: ICD-10-CM

## 2023-12-11 LAB
ALBUMIN SERPL BCP-MCNC: 4.8 G/DL (ref 3.5–5)
ALP SERPL-CCNC: 45 U/L (ref 34–104)
ALT SERPL W P-5'-P-CCNC: 16 U/L (ref 7–52)
ANION GAP SERPL CALCULATED.3IONS-SCNC: 7 MMOL/L
AST SERPL W P-5'-P-CCNC: 25 U/L (ref 13–39)
BILIRUB SERPL-MCNC: 0.5 MG/DL (ref 0.2–1)
BUN SERPL-MCNC: 17 MG/DL (ref 5–25)
CALCIUM SERPL-MCNC: 9.7 MG/DL (ref 8.4–10.2)
CHLORIDE SERPL-SCNC: 105 MMOL/L (ref 96–108)
CHOLEST SERPL-MCNC: 105 MG/DL
CO2 SERPL-SCNC: 28 MMOL/L (ref 21–32)
CREAT SERPL-MCNC: 0.83 MG/DL (ref 0.6–1.3)
GFR SERPL CREATININE-BSD FRML MDRD: 94 ML/MIN/1.73SQ M
GLUCOSE P FAST SERPL-MCNC: 119 MG/DL (ref 65–99)
HDLC SERPL-MCNC: 31 MG/DL
LDLC SERPL CALC-MCNC: 50 MG/DL (ref 0–100)
NONHDLC SERPL-MCNC: 74 MG/DL
POTASSIUM SERPL-SCNC: 4.3 MMOL/L (ref 3.5–5.3)
PROT SERPL-MCNC: 7.1 G/DL (ref 6.4–8.4)
PSA SERPL-MCNC: 0.3 NG/ML (ref 0–4)
SODIUM SERPL-SCNC: 140 MMOL/L (ref 135–147)
TRIGL SERPL-MCNC: 118 MG/DL

## 2023-12-11 PROCEDURE — 36415 COLL VENOUS BLD VENIPUNCTURE: CPT

## 2023-12-11 PROCEDURE — G0103 PSA SCREENING: HCPCS

## 2023-12-11 PROCEDURE — 99214 OFFICE O/P EST MOD 30 MIN: CPT | Performed by: PHYSICIAN ASSISTANT

## 2023-12-11 PROCEDURE — 73502 X-RAY EXAM HIP UNI 2-3 VIEWS: CPT

## 2023-12-11 PROCEDURE — 80053 COMPREHEN METABOLIC PANEL: CPT

## 2023-12-11 PROCEDURE — 90686 IIV4 VACC NO PRSV 0.5 ML IM: CPT

## 2023-12-11 PROCEDURE — 90471 IMMUNIZATION ADMIN: CPT

## 2023-12-11 PROCEDURE — 80061 LIPID PANEL: CPT

## 2023-12-11 NOTE — PROGRESS NOTES
Assessment/Plan:     Diagnoses and all orders for this visit:    Coronary artery disease of bypass graft of native heart with stable angina pectoris ([address])  Comments:  Currently stable. No recent episodes of angina. Continue current regimen. Essential hypertension  Comments:  Pressure well-controlled continue current regimen    Mixed hyperlipidemia  Comments:  Continue statin and fenofibrate. Continue low-fat diet. Proceed with labs    Prediabetes  Comments:  Continue diet and exercise. Gastroesophageal reflux disease without esophagitis  Comments:  Able on no medication    Depression, major, recurrent, mild (HCC)  Comments:  Currently in remission on Lexapro    Pain of right hip  Comments:  X-ray ordered. May need orthopedic  Orders:  -     XR hip/pelv 2-3 vws right if performed; Future    Insomnia, unspecified type  Comments:  Continue as needed trazodone    Encounter for immunization          Subjective:      Patient ID: Juan Jose Croft is a 59 y.o. male. Patient presents in the office for follow-up chronic conditions. Patient has a history of coronary artery disease, hypertension and hyperlipidemia. Is currently on Plavix and aspirin. He also takes Imdur 60 mg a day. He is on metoprolol  mg. Pressure is well-controlled. Per lipidemia he is on Prilosec fenofibrate and pravastatin. 3 of elevated fasting glucose. Patient has been dieting and exercising regularly. He is down to 166 pounds. Of anxiety depression and insomnia. His depression is in remission on Lexapro 10 mg. His trazodone 50 mg at night as needed. Flu vaccine updated today. He is due for routine labs. She has been having chronic right hip pain since he fell out of a tree a year ago. He has been going to chiropractor with no relief. Will obtain x-rays today.   Patient has tenderness in the right hip region as well as the right lower lumbar region        The following portions of the patient's history were reviewed and updated as appropriate: He   Patient Active Problem List    Diagnosis Date Noted    History of coronary artery stent placement 05/04/2021    Unstable angina ([address]) 11/12/2020    Essential hypertension 11/12/2020    Benign prostatic hyperplasia with urinary hesitancy 02/07/2020    Prediabetes 11/06/2019    Screening for prostate cancer 09/24/2018    Screen for colon cancer 09/24/2018    Hx of CABG 03/09/2018    Low HDL (under 40) 11/17/2017    Depression, major, recurrent, mild ([address]) 08/21/2017    Obesity 03/21/2016    Obstructive sleep apnea 04/14/2014    Mixed hyperlipidemia 08/22/2013    Anxiety 03/01/2012    Coronary artery disease of bypass graft of native heart with stable angina pectoris ([address]) 03/01/2012    Esophageal reflux 03/01/2012    Insomnia 03/01/2012     Current Outpatient Medications   Medication Sig Dispense Refill    aspirin 81 mg chewable tablet Chew 81 mg daily      clopidogrel (PLAVIX) 75 mg tablet Take 1 tablet (75 mg total) by mouth daily 90 tablet 0    Coenzyme Q10 200 MG capsule Take 200 mg by mouth daily      escitalopram (LEXAPRO) 10 mg tablet TAKE 1 TABLET DAILY 90 tablet 1    fenofibrate (TRICOR) 145 mg tablet TAKE 1 TABLET DAILY 90 tablet 1    isosorbide mononitrate (IMDUR) 60 mg 24 hr tablet TAKE 1 TABLET DAILY 90 tablet 1    metoprolol succinate (TOPROL-XL) 100 mg 24 hr tablet TAKE 1 TABLET DAILY 90 tablet 1    nitroglycerin (NITROSTAT) 0.4 mg SL tablet Place 1 tablet (0.4 mg total) under the tongue every 5 (five) minutes as needed for chest pain 30 tablet 5    Omega-3 Fatty Acids (FISH OIL) 645 MG CAPS Take by mouth Daily      pravastatin (PRAVACHOL) 40 mg tablet TAKE 1 TABLET AT BEDTIME 90 tablet 1    traZODone (DESYREL) 50 mg tablet TAKE 1 TABLET DAILY AT     BEDTIME AS NEEDED FOR SLEEP 90 tablet 1    Ascorbic Acid (VITAMIN C PO) Take by mouth (Patient not taking: Reported on 12/11/2023)      B Complex-C (B-COMPLEX WITH VITAMIN C) tablet Take 1 tablet by mouth daily       No current facility-administered medications for this visit. He is allergic to iodinated contrast media, meloxicam, and omnipaque [iohexol]. .    Review of Systems   Constitutional:  Negative for activity change, appetite change, chills, fatigue and fever. HENT:  Negative for ear pain and sore throat. Eyes:  Negative for visual disturbance. Respiratory:  Negative for cough and shortness of breath. Cardiovascular:  Negative for chest pain, palpitations and leg swelling. Gastrointestinal:  Negative for abdominal pain, blood in stool, constipation, diarrhea and nausea. Genitourinary:  Negative for difficulty urinating. Musculoskeletal:  Positive for arthralgias. Negative for back pain and myalgias. Right  hip pain and  swelling. [place]  out of tree. One  year   ago. Skin:  Negative for rash. Neurological:  Negative for dizziness, syncope and headaches. Psychiatric/Behavioral:  Negative for self-injury, sleep disturbance and suicidal ideas. The patient is not nervous/anxious. Objective:        Physical Exam  Vitals and nursing note reviewed. Constitutional:       General: He is not in acute distress. Appearance: Normal appearance. He is well-developed. He is not ill-appearing. HENT:      Head: Normocephalic. Comments: Surgical  scar  right parietal  area. Right Ear: Tympanic membrane, ear canal and external ear normal.      Left Ear: Tympanic membrane, ear canal and external ear normal.   Eyes:      Conjunctiva/sclera: Conjunctivae normal.      Pupils: Pupils are equal, round, and reactive to light. Neck:      Thyroid: No thyromegaly. Vascular: No carotid bruit. Cardiovascular:      Rate and Rhythm: Normal rate and regular rhythm. Heart sounds: Normal heart sounds. No murmur heard. Pulmonary:      Effort: Pulmonary effort is normal.      Breath sounds: Normal breath sounds. No wheezing.    Abdominal:      General: Bowel sounds are normal. Palpations: Abdomen is soft. There is no mass. Tenderness: There is no abdominal tenderness. Musculoskeletal:      Right lower leg: No edema. Left lower leg: No edema. Comments: Tenderness  right  hip. Good  rom. Tender   right lower  lumbar  region. Lymphadenopathy:      Cervical: No cervical adenopathy. Skin:     General: Skin is warm and dry. Neurological:      General: No focal deficit present. Mental Status: He is alert and oriented to person, place, and time. Psychiatric:         Mood and Affect: Mood normal.         Behavior: Behavior normal.         Thought Content:  Thought content normal.         Judgment: Judgment normal.

## 2023-12-13 DIAGNOSIS — I25.10 CORONARY ARTERY DISEASE INVOLVING NATIVE CORONARY ARTERY OF NATIVE HEART WITHOUT ANGINA PECTORIS: ICD-10-CM

## 2023-12-14 RX ORDER — CLOPIDOGREL BISULFATE 75 MG/1
75 TABLET ORAL DAILY
Qty: 90 TABLET | Refills: 0 | Status: SHIPPED | OUTPATIENT
Start: 2023-12-14

## 2023-12-14 NOTE — TELEPHONE ENCOUNTER
Requested medication(s) are due for refill today: Yes  Patient has already received a courtesy refill: Yes  Other reason request has been forwarded to provider: Patient needs updated blood work. Please place orders.

## 2024-01-22 ENCOUNTER — OFFICE VISIT (OUTPATIENT)
Dept: OBGYN CLINIC | Facility: CLINIC | Age: 64
End: 2024-01-22
Payer: COMMERCIAL

## 2024-01-22 ENCOUNTER — APPOINTMENT (OUTPATIENT)
Dept: RADIOLOGY | Facility: CLINIC | Age: 64
End: 2024-01-22
Payer: COMMERCIAL

## 2024-01-22 VITALS
DIASTOLIC BLOOD PRESSURE: 81 MMHG | HEIGHT: 66 IN | HEART RATE: 55 BPM | SYSTOLIC BLOOD PRESSURE: 149 MMHG | WEIGHT: 168.6 LBS | BODY MASS INDEX: 27.1 KG/M2

## 2024-01-22 DIAGNOSIS — M54.16 RADICULOPATHY, LUMBAR REGION: Primary | ICD-10-CM

## 2024-01-22 DIAGNOSIS — M54.16 RADICULOPATHY, LUMBAR REGION: ICD-10-CM

## 2024-01-22 DIAGNOSIS — S79.911A HIP INJURY, RIGHT, INITIAL ENCOUNTER: ICD-10-CM

## 2024-01-22 PROCEDURE — 72110 X-RAY EXAM L-2 SPINE 4/>VWS: CPT

## 2024-01-22 PROCEDURE — 99243 OFF/OP CNSLTJ NEW/EST LOW 30: CPT | Performed by: FAMILY MEDICINE

## 2024-01-22 NOTE — LETTER
January 22, 2024     SOLIS Puente[address] [place] [address]  [address] [place] [id number]    Patient: Benigno Palma   YOB: 1960   Date of Visit: 1/22/2024       Dear Dr. Holguin:    Thank you for referring Benigno Palma to me for evaluation. Below are my notes for this consultation.    If you have questions, please do not hesitate to call me. I look forward to following your patient along with you.         Sincerely,        Elizabet Villagran, DO        CC: No Recipients    Elizabet Villagran DO  1/22/2024 12:51 PM  Incomplete  Assessment/Plan:  Assessment/Plan  Diagnoses and all orders for this visit:    Radiculopathy, lumbar region  -     Ambulatory Referral to Orthopedic Surgery  -     CT spine lumbar wo contrast; Future  -     XR spine lumbar minimum 4 views non injury; Future    Hip injury, right, initial encounter  -     CT lower extremity wo contrast right; Future    Other orders  -     vitamin E, tocopherol, 200 units capsule; Take by oral route.      63-year-old male with low back and right hip pain more than 4 years duration following injury.  Discussed with patient physical exam, radiographs, impression, and plan.  X-rays of the right hip unremarkable for osseous abnormality or significant degenerative change.  X-rays lumbar spine noted for L5-S1 spondylolysis with mild anterolisthesis. Physical exam lumbar spine noted for right paraspinal tenderness. He has limited range of motion with rotating and sidebending to both sides.  Right lower extremity noted for weakness with hip flexion and hip abduction at 4/5.  There is no groin pain with EDIE and FADDIR of the hip.  There is tenderness on the right at the greater trochanter and piriformis.  There is positive straight leg raise on the right.  He has normal sensation both lower extremities.  Clinical impression is that he has symptoms from combination of lumbar spine radiculopathy and hip pathology.  He is  more than 4 years since injury and still having symptoms despite conservative management of multiple courses of oral steroid, chiropractic once weekly for more than 1 year and on daily basis doing stretching and strengthening exercises.  At this time I will refer him for CT scan lumbar spine (MRI precluded by brain surgery hardware) to evaluate for stenosis, disc pathology, and facet arthropathy as invasive management may be warranted.  I will refer him for CT scan right hip to evaluate for occult osseous injury as invasive management may be warranted.  He will return after having CT scans done.        Subjective:   Patient ID: Benigno Palma is a 63 y.o. male.  Chief Complaint   Patient presents with    Right Hip - Pain    Lower Back - Pain        63-year-old male presents for evaluation of low back and right lower extremity pain and numbness more than 4 years duration.  He had fall injury from sliding down a tree.  He had pain described as sudden in onset, localized to the right low back and right lateral hip, rating distally to the right lower leg, achy and burning, associated with numbness and tingling in the right lower extremity, worse with direct pressure and physical activity, and improved with resting.  He was seen by Dr. Hernandez and prescribed course of oral steroid.  He started seeing chiropractor for treatments.  Intense symptoms subsided and he continued being physically active.  He reports over the past year symptoms have been worsening.  He has been seeing chiropractor once weekly and on daily basis does home stretching and strengthening exercises.  He was seen by primary care provider referred for x-rays, and referred to orthopedic care.    Back Pain  This is a chronic problem. The current episode started more than 1 year ago. The problem occurs daily. The problem has been gradually worsening. Associated symptoms include arthralgias and numbness. Pertinent negatives include no abdominal pain, chest  "pain, chills, fever, joint swelling, rash, sore throat or weakness. The symptoms are aggravated by twisting and bending. He has tried rest and position changes (Oral steroid, chiropractic, home exercise program) for the symptoms. The treatment provided mild relief.           The following portions of the patient's history were reviewed and updated as appropriate: He  has a past medical history of Anxiety, Coronary artery disease, Depression, Hyperlipidemia, Nephrolithiasis, Rotator cuff tear, Sleep apnea, and Subdural hemorrhage-concussion (HCC).  He is allergic to iodinated contrast media, meloxicam, and omnipaque [iohexol]..    Review of Systems   Constitutional:  Negative for chills and fever.   HENT:  Negative for sore throat.    Eyes:  Negative for visual disturbance.   Respiratory:  Negative for shortness of breath.    Cardiovascular:  Negative for chest pain.   Gastrointestinal:  Negative for abdominal pain.   Genitourinary:  Negative for flank pain.   Musculoskeletal:  Positive for arthralgias and back pain. Negative for joint swelling.   Skin:  Negative for rash and wound.   Neurological:  Positive for numbness. Negative for weakness.   Hematological:  Does not bruise/bleed easily.   Psychiatric/Behavioral:  Negative for self-injury.        Objective:  Vitals:    01/22/24 0802   BP: 149/81   Pulse: 55   Weight: 76.5 kg (168 lb 9.6 oz)   Height: 5' 6\" (1.676 m)      Right Ankle Exam     Muscle Strength   Dorsiflexion:  5/5  Plantar flexion:  5/5       Left Ankle Exam     Muscle Strength   Dorsiflexion:  5/5   Plantar flexion:  5/5       Right Hip Exam     Tenderness   The patient is experiencing tenderness in the greater trochanter (Piriformis).    Muscle Strength   Abduction: 4/5   Flexion: 4/5     Tests   EDIE: negative      Left Hip Exam     Muscle Strength   Flexion: 5/5     Tests   EDIE: negative      Back Exam     Tenderness   The patient is experiencing tenderness in the lumbar.    Range of Motion "   Extension:  normal   Flexion:  normal   Lateral bend right:  abnormal   Lateral bend left:  abnormal   Rotation right:  abnormal   Rotation left:  abnormal     Tests   Straight leg raise right: positive  Straight leg raise left: negative    Other   Sensation: normal          Strength/Myotome Testing     Left Ankle/Foot   Dorsiflexion: 5  Plantar flexion: 5    Right Ankle/Foot   Dorsiflexion: 5  Plantar flexion: 5      Physical Exam  Vitals and nursing note reviewed.   Constitutional:       Appearance: Normal appearance. He is well-developed. He is not ill-appearing or diaphoretic.   HENT:      Head: Normocephalic and atraumatic.      Right Ear: External ear normal.      Left Ear: External ear normal.   Eyes:      Conjunctiva/sclera: Conjunctivae normal.   Neck:      Trachea: No tracheal deviation.   Cardiovascular:      Rate and Rhythm: Normal rate.   Pulmonary:      Effort: Pulmonary effort is normal. No respiratory distress.   Abdominal:      General: There is no distension.   Musculoskeletal:         General: Tenderness present. No swelling, deformity or signs of injury.      Lumbar back: Positive right straight leg raise test. Negative left straight leg raise test.   Skin:     General: Skin is warm and dry.      Coloration: Skin is not jaundiced or pale.   Neurological:      Mental Status: He is alert and oriented to person, place, and time.   Psychiatric:         Mood and Affect: Mood normal.         Behavior: Behavior normal.         Thought Content: Thought content normal.         Judgment: Judgment normal.         I have personally reviewed pertinent films in PACS and my interpretation is  .  X-rays of the right hip unremarkable for osseous abnormality or significant degenerative change.  X-rays lumbar spine noted for L5-S1 spondylolysis with mild anterolisthesis.          Elizabet Villagran DO  1/22/2024  8:44 AM  Sign when Signing Visit  Assessment/Plan:  Assessment/Plan  Diagnoses and all  orders for this visit:    Radiculopathy, lumbar region  -     Ambulatory Referral to Orthopedic Surgery  -     CT spine lumbar wo contrast; Future  -     XR spine lumbar minimum 4 views non injury; Future    Hip injury, right, initial encounter  -     CT lower extremity wo contrast right; Future    Other orders  -     vitamin E, tocopherol, 200 units capsule; Take by oral route.      63-year-old male with low back and right hip pain more than 4 years duration following injury.  Discussed with patient physical exam, radiographs, impression, and plan.  X-rays of the right hip unremarkable for osseous abnormality or significant degenerative change.  X-rays lumbar spine  Physical exam lumbar spine noted for right paraspinal tenderness.  He has limited range of motion with rotating and sidebending to both sides.  Right lower extremity noted for weakness with hip flexion and hip abduction at 4/5.  There is no groin pain with EDIE and FADDIR of the hip.  There is tenderness on the right at the greater trochanter and piriformis.  There is positive straight leg raise on the right.  He has normal sensation both lower extremities.  Clinical impression is that he has symptoms from combination of lumbar spine radiculopathy and hip pathology.  He is more than 4 years since injury and still having symptoms despite conservative management of multiple courses of oral steroid, chiropractic once weekly for more than 1 year and on daily basis doing stretching and strengthening exercises.  At this time I will refer him for CT scan lumbar spine (MRI precluded by brain surgery hardware) to evaluate for stenosis, disc pathology, and facet arthropathy as invasive management may be warranted.  I will refer him for CT scan right hip to evaluate for occult osseous injury as invasive management may be warranted.  He will return after having CT scans done.        Subjective:   Patient ID: Benigno Palma is a 63 y.o. male.  Chief Complaint    Patient presents with    Right Hip - Pain    Lower Back - Pain        63-year-old male presents for evaluation of low back and right lower extremity pain and numbness more than 4 years duration.  He had fall injury from sliding down a tree.  He had pain described as sudden in onset, localized to the right low back and right lateral hip, rating distally to the right lower leg, achy and burning, associated with numbness and tingling in the right lower extremity, worse with direct pressure and physical activity, and improved with resting.  He was seen by Dr. Hernandez and prescribed course of oral steroid.  He started seeing chiropractor for treatments.  Intense symptoms subsided and he continued being physically active.  He reports over the past year symptoms have been worsening.  He has been seeing chiropractor once weekly and on daily basis does home stretching and strengthening exercises.  He was seen by primary care provider referred for x-rays, and referred to orthopedic care.    Back Pain  This is a chronic problem. The current episode started more than 1 year ago. The problem occurs daily. The problem has been gradually worsening. Associated symptoms include arthralgias and numbness. Pertinent negatives include no joint swelling or weakness. The symptoms are aggravated by twisting and bending. He has tried rest and position changes (Oral steroid, chiropractic, home exercise program) for the symptoms. The treatment provided mild relief.           The following portions of the patient's history were reviewed and updated as appropriate: He  has a past medical history of Anxiety, Coronary artery disease, Depression, Hyperlipidemia, Nephrolithiasis, Rotator cuff tear, Sleep apnea, and Subdural hemorrhage-concussion (HCC).  He is allergic to iodinated contrast media, meloxicam, and omnipaque [iohexol]..    Review of Systems   Musculoskeletal:  Positive for arthralgias and back pain. Negative for joint swelling.  "  Neurological:  Positive for numbness. Negative for weakness.       Objective:  Vitals:    01/22/24 0802   BP: 149/81   Pulse: 55   Weight: 76.5 kg (168 lb 9.6 oz)   Height: 5' 6\" (1.676 m)      Ortho Exam    Physical Exam  Vitals and nursing note reviewed.   Constitutional:       Appearance: Normal appearance. He is well-developed. He is not ill-appearing or diaphoretic.   HENT:      Head: Normocephalic and atraumatic.      Right Ear: External ear normal.      Left Ear: External ear normal.   Eyes:      Conjunctiva/sclera: Conjunctivae normal.   Neck:      Trachea: No tracheal deviation.   Cardiovascular:      Rate and Rhythm: Normal rate.   Pulmonary:      Effort: Pulmonary effort is normal. No respiratory distress.   Abdominal:      General: There is no distension.   Skin:     General: Skin is warm and dry.      Coloration: Skin is not jaundiced or pale.   Neurological:      Mental Status: He is alert and oriented to person, place, and time.   Psychiatric:         Mood and Affect: Mood normal.         Behavior: Behavior normal.         Thought Content: Thought content normal.         Judgment: Judgment normal.         {Imaging Review Statement:[id number]}          "

## 2024-01-22 NOTE — PROGRESS NOTES
Assessment/Plan:  Assessment/Plan   Diagnoses and all orders for this visit:    Radiculopathy, lumbar region  -     Ambulatory Referral to Orthopedic Surgery  -     CT spine lumbar wo contrast; Future  -     XR spine lumbar minimum 4 views non injury; Future    Hip injury, right, initial encounter  -     CT lower extremity wo contrast right; Future    Other orders  -     vitamin E, tocopherol, 200 units capsule; Take by oral route.      63-year-old male with low back and right hip pain more than 4 years duration following injury.  Discussed with patient physical exam, radiographs, impression, and plan.  X-rays of the right hip unremarkable for osseous abnormality or significant degenerative change.  X-rays lumbar spine noted for L5-S1 spondylolysis with mild anterolisthesis. Physical exam lumbar spine noted for right paraspinal tenderness. He has limited range of motion with rotating and sidebending to both sides.  Right lower extremity noted for weakness with hip flexion and hip abduction at 4/5.  There is no groin pain with EDIE and FADDIR of the hip.  There is tenderness on the right at the greater trochanter and piriformis.  There is positive straight leg raise on the right.  He has normal sensation both lower extremities.  Clinical impression is that he has symptoms from combination of lumbar spine radiculopathy and hip pathology.  He is more than 4 years since injury and still having symptoms despite conservative management of multiple courses of oral steroid, chiropractic once weekly for more than 1 year and on daily basis doing stretching and strengthening exercises.  At this time I will refer him for CT scan lumbar spine (MRI precluded by brain surgery hardware) to evaluate for stenosis, disc pathology, and facet arthropathy as invasive management may be warranted.  I will refer him for CT scan right hip to evaluate for occult osseous injury as invasive management may be warranted.  He will return after  having CT scans done.        Subjective:   Patient ID: Benigno Palma is a 63 y.o. male.  Chief Complaint   Patient presents with    Right Hip - Pain    Lower Back - Pain        63-year-old male presents for evaluation of low back and right lower extremity pain and numbness more than 4 years duration.  He had fall injury from sliding down a tree.  He had pain described as sudden in onset, localized to the right low back and right lateral hip, rating distally to the right lower leg, achy and burning, associated with numbness and tingling in the right lower extremity, worse with direct pressure and physical activity, and improved with resting.  He was seen by Dr. Hernandez and prescribed course of oral steroid.  He started seeing chiropractor for treatments.  Intense symptoms subsided and he continued being physically active.  He reports over the past year symptoms have been worsening.  He has been seeing chiropractor once weekly and on daily basis does home stretching and strengthening exercises.  He was seen by primary care provider referred for x-rays, and referred to orthopedic care.    Back Pain  This is a chronic problem. The current episode started more than 1 year ago. The problem occurs daily. The problem has been gradually worsening. Associated symptoms include arthralgias and numbness. Pertinent negatives include no abdominal pain, chest pain, chills, fever, joint swelling, rash, sore throat or weakness. The symptoms are aggravated by twisting and bending. He has tried rest and position changes (Oral steroid, chiropractic, home exercise program) for the symptoms. The treatment provided mild relief.           The following portions of the patient's history were reviewed and updated as appropriate: He  has a past medical history of Anxiety, Coronary artery disease, Depression, Hyperlipidemia, Nephrolithiasis, Rotator cuff tear, Sleep apnea, and Subdural hemorrhage-concussion (HCC).  He is allergic to iodinated  "contrast media, meloxicam, and omnipaque [iohexol]..    Review of Systems   Constitutional:  Negative for chills and fever.   HENT:  Negative for sore throat.    Eyes:  Negative for visual disturbance.   Respiratory:  Negative for shortness of breath.    Cardiovascular:  Negative for chest pain.   Gastrointestinal:  Negative for abdominal pain.   Genitourinary:  Negative for flank pain.   Musculoskeletal:  Positive for arthralgias and back pain. Negative for joint swelling.   Skin:  Negative for rash and wound.   Neurological:  Positive for numbness. Negative for weakness.   Hematological:  Does not bruise/bleed easily.   Psychiatric/Behavioral:  Negative for self-injury.        Objective:  Vitals:    01/22/24 0802   BP: 149/81   Pulse: 55   Weight: 76.5 kg (168 lb 9.6 oz)   Height: 5' 6\" (1.676 m)      Right Ankle Exam     Muscle Strength   Dorsiflexion:  5/5  Plantar flexion:  5/5       Left Ankle Exam     Muscle Strength   Dorsiflexion:  5/5   Plantar flexion:  5/5       Right Hip Exam     Tenderness   The patient is experiencing tenderness in the greater trochanter (Piriformis).    Muscle Strength   Abduction: 4/5   Flexion: 4/5     Tests   EDIE: negative      Left Hip Exam     Muscle Strength   Flexion: 5/5     Tests   EDIE: negative      Back Exam     Tenderness   The patient is experiencing tenderness in the lumbar.    Range of Motion   Extension:  normal   Flexion:  normal   Lateral bend right:  abnormal   Lateral bend left:  abnormal   Rotation right:  abnormal   Rotation left:  abnormal     Tests   Straight leg raise right: positive  Straight leg raise left: negative    Other   Sensation: normal          Strength/Myotome Testing     Left Ankle/Foot   Dorsiflexion: 5  Plantar flexion: 5    Right Ankle/Foot   Dorsiflexion: 5  Plantar flexion: 5      Physical Exam  Vitals and nursing note reviewed.   Constitutional:       Appearance: Normal appearance. He is well-developed. He is not ill-appearing or " diaphoretic.   HENT:      Head: Normocephalic and atraumatic.      Right Ear: External ear normal.      Left Ear: External ear normal.   Eyes:      Conjunctiva/sclera: Conjunctivae normal.   Neck:      Trachea: No tracheal deviation.   Cardiovascular:      Rate and Rhythm: Normal rate.   Pulmonary:      Effort: Pulmonary effort is normal. No respiratory distress.   Abdominal:      General: There is no distension.   Musculoskeletal:         General: Tenderness present. No swelling, deformity or signs of injury.      Lumbar back: Positive right straight leg raise test. Negative left straight leg raise test.   Skin:     General: Skin is warm and dry.      Coloration: Skin is not jaundiced or pale.   Neurological:      Mental Status: He is alert and oriented to person, place, and time.   Psychiatric:         Mood and Affect: Mood normal.         Behavior: Behavior normal.         Thought Content: Thought content normal.         Judgment: Judgment normal.         I have personally reviewed pertinent films in PACS and my interpretation is  .  X-rays of the right hip unremarkable for osseous abnormality or significant degenerative change.  X-rays lumbar spine noted for L5-S1 spondylolysis with mild anterolisthesis.

## 2024-01-22 NOTE — LETTER
January 22, 2024     SOL[facility] Puente[address] [place] [address]  [address] [place] [id number]    Patient: Benigno Palma   YOB: 1960   Date of Visit: 1/22/2024       Dear Dr. Holguin:    Thank you for referring Benigno Palma to me for evaluation. Below are my notes for this consultation.    If you have questions, please do not hesitate to call me. I look forward to following your patient along with you.         Sincerely,        Elizabet Villagran, DO        CC: No Recipients    Elizabet Villagran DO  1/22/2024 12:51 PM  Sign when Signing Visit  Assessment/Plan:  Assessment/Plan  Diagnoses and all orders for this visit:    Radiculopathy, lumbar region  -     Ambulatory Referral to Orthopedic Surgery  -     CT spine lumbar wo contrast; Future  -     XR spine lumbar minimum 4 views non injury; Future    Hip injury, right, initial encounter  -     CT lower extremity wo contrast right; Future    Other orders  -     vitamin E, tocopherol, 200 units capsule; Take by oral route.      63-year-old male with low back and right hip pain more than 4 years duration following injury.  Discussed with patient physical exam, radiographs, impression, and plan.  X-rays of the right hip unremarkable for osseous abnormality or significant degenerative change.  X-rays lumbar spine noted for L5-S1 spondylolysis with mild anterolisthesis. Physical exam lumbar spine noted for right paraspinal tenderness. He has limited range of motion with rotating and sidebending to both sides.  Right lower extremity noted for weakness with hip flexion and hip abduction at 4/5.  There is no groin pain with EDIE and FADDIR of the hip.  There is tenderness on the right at the greater trochanter and piriformis.  There is positive straight leg raise on the right.  He has normal sensation both lower extremities.  Clinical impression is that he has symptoms from combination of lumbar spine radiculopathy and hip  pathology.  He is more than 4 years since injury and still having symptoms despite conservative management of multiple courses of oral steroid, chiropractic once weekly for more than 1 year and on daily basis doing stretching and strengthening exercises.  At this time I will refer him for CT scan lumbar spine (MRI precluded by brain surgery hardware) to evaluate for stenosis, disc pathology, and facet arthropathy as invasive management may be warranted.  I will refer him for CT scan right hip to evaluate for occult osseous injury as invasive management may be warranted.  He will return after having CT scans done.        Subjective:   Patient ID: Benigno Palma is a 63 y.o. male.  Chief Complaint   Patient presents with   • Right Hip - Pain   • Lower Back - Pain        63-year-old male presents for evaluation of low back and right lower extremity pain and numbness more than 4 years duration.  He had fall injury from sliding down a tree.  He had pain described as sudden in onset, localized to the right low back and right lateral hip, rating distally to the right lower leg, achy and burning, associated with numbness and tingling in the right lower extremity, worse with direct pressure and physical activity, and improved with resting.  He was seen by Dr. Hernandez and prescribed course of oral steroid.  He started seeing chiropractor for treatments.  Intense symptoms subsided and he continued being physically active.  He reports over the past year symptoms have been worsening.  He has been seeing chiropractor once weekly and on daily basis does home stretching and strengthening exercises.  He was seen by primary care provider referred for x-rays, and referred to orthopedic care.    Back Pain  This is a chronic problem. The current episode started more than 1 year ago. The problem occurs daily. The problem has been gradually worsening. Associated symptoms include arthralgias and numbness. Pertinent negatives include no  "abdominal pain, chest pain, chills, fever, joint swelling, rash, sore throat or weakness. The symptoms are aggravated by twisting and bending. He has tried rest and position changes (Oral steroid, chiropractic, home exercise program) for the symptoms. The treatment provided mild relief.           The following portions of the patient's history were reviewed and updated as appropriate: He  has a past medical history of Anxiety, Coronary artery disease, Depression, Hyperlipidemia, Nephrolithiasis, Rotator cuff tear, Sleep apnea, and Subdural hemorrhage-concussion (HCC).  He is allergic to iodinated contrast media, meloxicam, and omnipaque [iohexol]..    Review of Systems   Constitutional:  Negative for chills and fever.   HENT:  Negative for sore throat.    Eyes:  Negative for visual disturbance.   Respiratory:  Negative for shortness of breath.    Cardiovascular:  Negative for chest pain.   Gastrointestinal:  Negative for abdominal pain.   Genitourinary:  Negative for flank pain.   Musculoskeletal:  Positive for arthralgias and back pain. Negative for joint swelling.   Skin:  Negative for rash and wound.   Neurological:  Positive for numbness. Negative for weakness.   Hematological:  Does not bruise/bleed easily.   Psychiatric/Behavioral:  Negative for self-injury.        Objective:  Vitals:    01/22/24 0802   BP: 149/81   Pulse: 55   Weight: 76.5 kg (168 lb 9.6 oz)   Height: 5' 6\" (1.676 m)      Right Ankle Exam     Muscle Strength   Dorsiflexion:  5/5  Plantar flexion:  5/5       Left Ankle Exam     Muscle Strength   Dorsiflexion:  5/5   Plantar flexion:  5/5       Right Hip Exam     Tenderness   The patient is experiencing tenderness in the greater trochanter (Piriformis).    Muscle Strength   Abduction: 4/5   Flexion: 4/5     Tests   EDIE: negative      Left Hip Exam     Muscle Strength   Flexion: 5/5     Tests   EDIE: negative      Back Exam     Tenderness   The patient is experiencing tenderness in the " lumbar.    Range of Motion   Extension:  normal   Flexion:  normal   Lateral bend right:  abnormal   Lateral bend left:  abnormal   Rotation right:  abnormal   Rotation left:  abnormal     Tests   Straight leg raise right: positive  Straight leg raise left: negative    Other   Sensation: normal          Strength/Myotome Testing     Left Ankle/Foot   Dorsiflexion: 5  Plantar flexion: 5    Right Ankle/Foot   Dorsiflexion: 5  Plantar flexion: 5      Physical Exam  Vitals and nursing note reviewed.   Constitutional:       Appearance: Normal appearance. He is well-developed. He is not ill-appearing or diaphoretic.   HENT:      Head: Normocephalic and atraumatic.      Right Ear: External ear normal.      Left Ear: External ear normal.   Eyes:      Conjunctiva/sclera: Conjunctivae normal.   Neck:      Trachea: No tracheal deviation.   Cardiovascular:      Rate and Rhythm: Normal rate.   Pulmonary:      Effort: Pulmonary effort is normal. No respiratory distress.   Abdominal:      General: There is no distension.   Musculoskeletal:         General: Tenderness present. No swelling, deformity or signs of injury.      Lumbar back: Positive right straight leg raise test. Negative left straight leg raise test.   Skin:     General: Skin is warm and dry.      Coloration: Skin is not jaundiced or pale.   Neurological:      Mental Status: He is alert and oriented to person, place, and time.   Psychiatric:         Mood and Affect: Mood normal.         Behavior: Behavior normal.         Thought Content: Thought content normal.         Judgment: Judgment normal.         I have personally reviewed pertinent films in PACS and my interpretation is  .  X-rays of the right hip unremarkable for osseous abnormality or significant degenerative change.  X-rays lumbar spine noted for L5-S1 spondylolysis with mild anterolisthesis.

## 2024-02-16 ENCOUNTER — HOSPITAL ENCOUNTER (OUTPATIENT)
Dept: CT IMAGING | Facility: HOSPITAL | Age: 64
End: 2024-02-16
Attending: FAMILY MEDICINE
Payer: COMMERCIAL

## 2024-02-16 DIAGNOSIS — M54.16 RADICULOPATHY, LUMBAR REGION: ICD-10-CM

## 2024-02-16 DIAGNOSIS — S79.911A HIP INJURY, RIGHT, INITIAL ENCOUNTER: ICD-10-CM

## 2024-02-16 PROCEDURE — G1004 CDSM NDSC: HCPCS

## 2024-02-16 PROCEDURE — 72131 CT LUMBAR SPINE W/O DYE: CPT

## 2024-02-16 PROCEDURE — 73700 CT LOWER EXTREMITY W/O DYE: CPT

## 2024-02-19 ENCOUNTER — OFFICE VISIT (OUTPATIENT)
Dept: OBGYN CLINIC | Facility: CLINIC | Age: 64
End: 2024-02-19
Payer: COMMERCIAL

## 2024-02-19 ENCOUNTER — TELEPHONE (OUTPATIENT)
Dept: OBGYN CLINIC | Facility: CLINIC | Age: 64
End: 2024-02-19

## 2024-02-19 ENCOUNTER — APPOINTMENT (OUTPATIENT)
Dept: RADIOLOGY | Facility: CLINIC | Age: 64
End: 2024-02-19
Payer: COMMERCIAL

## 2024-02-19 VITALS
BODY MASS INDEX: 27.16 KG/M2 | DIASTOLIC BLOOD PRESSURE: 82 MMHG | WEIGHT: 169 LBS | SYSTOLIC BLOOD PRESSURE: 153 MMHG | HEIGHT: 66 IN | HEART RATE: 65 BPM

## 2024-02-19 DIAGNOSIS — M43.06 PARS DEFECT OF LUMBAR SPINE: ICD-10-CM

## 2024-02-19 DIAGNOSIS — M54.16 RADICULOPATHY, LUMBAR REGION: ICD-10-CM

## 2024-02-19 DIAGNOSIS — M51.26 PROTRUSION OF LUMBAR INTERVERTEBRAL DISC: ICD-10-CM

## 2024-02-19 DIAGNOSIS — M51.26 PROTRUSION OF LUMBAR INTERVERTEBRAL DISC: Primary | ICD-10-CM

## 2024-02-19 DIAGNOSIS — R19.8 ABDOMINAL WEAKNESS: ICD-10-CM

## 2024-02-19 DIAGNOSIS — M16.11 ARTHRITIS OF RIGHT HIP: ICD-10-CM

## 2024-02-19 DIAGNOSIS — S76.011D HIP STRAIN, RIGHT, SUBSEQUENT ENCOUNTER: ICD-10-CM

## 2024-02-19 DIAGNOSIS — M43.16 SPONDYLOLISTHESIS OF LUMBAR REGION: ICD-10-CM

## 2024-02-19 PROCEDURE — 72100 X-RAY EXAM L-S SPINE 2/3 VWS: CPT

## 2024-02-19 PROCEDURE — 99214 OFFICE O/P EST MOD 30 MIN: CPT | Performed by: FAMILY MEDICINE

## 2024-02-19 NOTE — PROGRESS NOTES
Assessment/Plan:  Assessment/Plan   Diagnoses and all orders for this visit:    Protrusion of lumbar intervertebral disc  -     Ambulatory Referral to Physical Therapy; Future    Radiculopathy, lumbar region  -     Ambulatory Referral to Physical Therapy; Future    Spondylolisthesis of lumbar region  -     XR spine lumbar 2 or 3 views injury; Future  -     Ambulatory Referral to Physical Therapy; Future  -     Ambulatory Referral to Orthopedic Surgery; Future    Pars defect of lumbar spine  -     XR spine lumbar 2 or 3 views injury; Future  -     Ambulatory Referral to Physical Therapy; Future  -     Ambulatory Referral to Orthopedic Surgery; Future    Abdominal weakness  -     Ambulatory Referral to Physical Therapy; Future    Arthritis of right hip  -     Ambulatory Referral to Physical Therapy; Future    Hip strain, right, subsequent encounter  -     Ambulatory Referral to Physical Therapy; Future        63-year-old male with low back and right hip pain of more than 4 years duration.  Discussed with patient imaging studies, impression, and plan.  CT scan lumbar spine noted for disc protrusion L5-S1, bilateral pars defects at L5-S1, disc bulging L3-L4 and L4-L5.  CT scan right hip unremarkable for appreciable fracture or sign of AVN.  There is degenerative change with cystic change at acetabulum.  On flexion and extension views lumbar spine there is very slight anterolisthesis L5 on S1.  Clinical impression is that he has symptoms from combination of lumbar spine pathology particularly degenerative changes as well as pars defect which may be contributing to lumbar radiculopathy, and there is component of right hip arthritis.  Regards to his right hip arthritis I discussed with patient option of steroid injection to which he agreed.  I will refer him to my sports medicine colleague for evaluation and consideration of ultrasound-guided right hip joint steroid injection.  Regards to the lumbar spine I will refer him  to formal therapy which he is to start as soon as possible and do home exercises as directed.  I advised patient that due to pars defect they may be concern for instability of the spine which may lead to further pain and dysfunction so I will also refer him to orthopedic spine surgeon for further evaluation and recommendation of treatment.  He will follow-up with me after undergoing hip injection.      Subjective:   Patient ID: Benigno Palma is a 63 y.o. male.  Chief Complaint   Patient presents with    Spine - Follow-up        63-year-old male following up for low back and right hip pain of more than 4 years duration.  He was last seen by me 4 weeks ago at which point he was referred for CT scans of lumbar spine and right hip.  He denies change in symptoms since his last visit.  He has pain described localized in the right low back and right lateral hip, radiating distally to the right lower leg, achy and burning, associated with numbness and tingling in the right lower extremity, worse with physical activity and bending and squatting, and improved with resting.  He also reports having pain in the right hip described localized to the anterior aspect, burning, worse with twisting, and improved with resting or changing position.    Back Pain  The current episode started more than 1 year ago. The problem occurs daily. The problem has been unchanged. Associated symptoms include arthralgias and numbness. Pertinent negatives include no joint swelling or weakness. The symptoms are aggravated by bending and twisting. He has tried rest and position changes (Chiropractic, home exercise) for the symptoms. The treatment provided mild relief.               Review of Systems   Musculoskeletal:  Positive for arthralgias and back pain. Negative for joint swelling.   Neurological:  Positive for numbness. Negative for weakness.       Objective:  Vitals:    02/19/24 0815   BP: 153/82   Pulse: 65   Weight: 76.7 kg (169 lb)  "  Height: 5' 6\" (1.676 m)      Back Exam     Range of Motion   Extension:  normal   Flexion:  normal     Other   Gait: normal             Physical Exam  Vitals and nursing note reviewed.   Constitutional:       Appearance: Normal appearance. He is well-developed. He is not ill-appearing or diaphoretic.   HENT:      Head: Normocephalic and atraumatic.      Right Ear: External ear normal.      Left Ear: External ear normal.   Eyes:      Conjunctiva/sclera: Conjunctivae normal.   Neck:      Trachea: No tracheal deviation.   Cardiovascular:      Rate and Rhythm: Normal rate.   Pulmonary:      Effort: Pulmonary effort is normal. No respiratory distress.   Abdominal:      General: There is no distension.   Skin:     General: Skin is warm and dry.      Coloration: Skin is not jaundiced or pale.   Neurological:      Mental Status: He is alert and oriented to person, place, and time.   Psychiatric:         Mood and Affect: Mood normal.         Behavior: Behavior normal.         Thought Content: Thought content normal.         Judgment: Judgment normal.         I have personally reviewed pertinent films in PACS and my interpretation is  .  CT scan lumbar spine noted for disc protrusion L5-S1, bilateral pars defects at L5-S1, disc bulging L3-L4 and L4-L5.  CT scan right hip unremarkable for appreciable fracture or sign of AVN.  There is degenerative change with cystic change at acetabulum.  On flexion and extension views lumbar spine there is very slight anterolisthesis L5 on S1.          "

## 2024-02-21 PROBLEM — Z12.11 SCREEN FOR COLON CANCER: Status: RESOLVED | Noted: 2018-09-24 | Resolved: 2024-02-21

## 2024-02-21 PROBLEM — Z12.5 SCREENING FOR PROSTATE CANCER: Status: RESOLVED | Noted: 2018-09-24 | Resolved: 2024-02-21

## 2024-03-23 ENCOUNTER — APPOINTMENT (OUTPATIENT)
Dept: RADIOLOGY | Facility: CLINIC | Age: 64
End: 2024-03-23
Payer: COMMERCIAL

## 2024-03-23 ENCOUNTER — OFFICE VISIT (OUTPATIENT)
Dept: OBGYN CLINIC | Facility: CLINIC | Age: 64
End: 2024-03-23
Payer: COMMERCIAL

## 2024-03-23 VITALS
SYSTOLIC BLOOD PRESSURE: 124 MMHG | DIASTOLIC BLOOD PRESSURE: 72 MMHG | WEIGHT: 166.6 LBS | TEMPERATURE: 97.8 F | RESPIRATION RATE: 18 BRPM | HEIGHT: 66 IN | HEART RATE: 52 BPM | OXYGEN SATURATION: 96 % | BODY MASS INDEX: 26.78 KG/M2

## 2024-03-23 DIAGNOSIS — M16.11 ARTHRITIS OF RIGHT HIP: ICD-10-CM

## 2024-03-23 DIAGNOSIS — M25.511 ACUTE PAIN OF RIGHT SHOULDER: ICD-10-CM
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
## 2024-03-23 PROCEDURE — 73502 X-RAY EXAM HIP UNI 2-3 VIEWS: CPT

## 2024-03-23 PROCEDURE — 20611 DRAIN/INJ JOINT/BURSA W/US: CPT | Performed by: FAMILY MEDICINE

## 2024-03-23 PROCEDURE — 99214 OFFICE O/P EST MOD 30 MIN: CPT | Performed by: FAMILY MEDICINE

## 2024-03-23 PROCEDURE — 73030 X-RAY EXAM OF SHOULDER: CPT

## 2024-03-23 RX ORDER — TRIAMCINOLONE ACETONIDE 40 MG/ML
40 INJECTION, SUSPENSION INTRA-ARTICULAR; INTRAMUSCULAR
Status: COMPLETED | OUTPATIENT
Start: 2024-03-23 | End: 2024-03-23

## 2024-03-23 RX ORDER — BUPIVACAINE HYDROCHLORIDE 2.5 MG/ML
8 INJECTION, SOLUTION INFILTRATION; PERINEURAL
Status: COMPLETED | OUTPATIENT
Start: 2024-03-23 | End: 2024-03-23

## 2024-03-23 RX ADMIN — TRIAMCINOLONE ACETONIDE 40 MG: 40 INJECTION, SUSPENSION INTRA-ARTICULAR; INTRAMUSCULAR at 08:30

## 2024-03-23 RX ADMIN — BUPIVACAINE HYDROCHLORIDE 8 ML: 2.5 INJECTION, SOLUTION INFILTRATION; PERINEURAL at 08:30

## 2024-03-23 NOTE — PROGRESS NOTES
"    Chief Complaint   Patient presents with    Right Hip - Pain    Right Shoulder - Pain       Benigno Palma is a 63 y.o. male who is presenting today for initial evaluation consultation to discuss possible intra-articular right hip injection for right hip osteoarthritis.  Unfortunately the patient had a slip on ice in the parking lot while walking into today's office visit.  He denies any head trauma, no loss of consciousness, no headache, no blurry vision.  He states he had landed onto his right shoulder and right hip.  Has been able to ambulate without difficulty.      The following portions of the patient's history were reviewed and updated as appropriate: allergies, current medications, past family history, past medical history, past social history, past surgical history and problem list.    Occupation:    Review of Systems   Constitutional: Negative for fever.   HENT: Negative for dental problem and headaches.    Eyes: Negative for vision loss.   Respiratory: Negative for cough and shortness of breath.    Cardiovascular: Negative for leg swelling and palpitations.   Gastrointestinal: Negative for constipation and diarrhea.   Genitourinary: Negative for bladder incontinence and difficulty urinating.   Musculoskeletal: Negative for back pain and difficulty walking.   Skin: Negative for rash and ulcer.   Neurological: Negative for dizziness and headaches.   Hem/Lymph/Immuno: Negative for blood clots. Does not bruise/bleed easily.   Psychiatric/Behavioral: Negative for confusion.         Objective:  /72   Pulse (!) 52   Temp 97.8 °F (36.6 °C)   Resp 18   Ht 5' 6\" (1.676 m)   Wt 75.6 kg (166 lb 9.6 oz)   SpO2 96%   BMI 26.89 kg/m²     Skin: no rashes, lesions, skin discolorations, lacerations  Vasculature: normal popliteal and pedal pulse, normal skin color, normal capillary refill in extremity, no lower extremity edema  Neurologic: Neurologic exam is normal throughout lower extremities, Awake, " alert, and oriented x3, no apparent distress.    Neuro: no weakness in the L4-S1 nerve distribution  Musculoskeletal:  Inspection: no observable abnormalities  Palpation no tenderness to palpation over greater trochanter  ROM: Full flexion and extension of the hip. full internal and external rotation  Special tests: + FADIR and EDIE test         Right shoulder   FROM ff, IR ER   5/5 with rotator cuff SS, IS, Subscapularis  No ecchymosis or gross defomity       CN II- XII intact   No gross motor or sensory deficits in the C5-T1 mytomal or L1-L5 distribtuion   Weight bearnig without difficult  Alert and oriented x 3  Imaging:  See below     Assessment/Plan:  1. Fall due to slipping on ice or snow, initial encounter  Patient unfortunately had a fall while coming into today's office visit.  Radiographs of the right shoulder and right hip were obtained.  No acute fracture or dislocation was noted.  Follow-up on official radiology report reading.  He denies any trauma to head, denies any headaches, no loss of consciousness no blurry vision.  He is neurological examination is within normal limits, with no gross deficits.  I did encourage him to proceed to the emergency room if he is to develop any symptoms of headaches, blurry vision, or change in cognition.  I had discussed with the patient to consider proceeding to the ER to rule out any head injury however he declines.  - XR shoulder 2+ vw right; Future  - XR hip/pelv 2-3 vws right if performed; Future    2. Acute pain of right shoulder  No acute fractures were seen on the radiograph.  His range of motion is within normal limits.  5 out of 5 strength with rotator cuff evaluation.  I suspect most likely contusion of the right shoulder.  He is advised to follow-up if no improvement in the next 2 to 3 weeks.    3. Arthritis of right hip  Large joint arthrocentesis: R hip joint  Universal Protocol:  Consent: Verbal consent obtained.  Consent given by: patient  Patient  identity confirmed: verbally with patient  Supporting Documentation  Indications: pain   Procedure Details  Location: hip - R hip joint  Preparation: Patient was prepped and draped in the usual sterile fashion  Needle size: 22 G  Ultrasound guidance: yes  Approach: anterolateral  Medications administered: 8 mL bupivacaine 0.25 %; 40 mg triamcinolone acetonide 40 mg/mL    Patient tolerance: patient tolerated the procedure well with no immediate complications    Risks and benefits of CSI were discussed with patient extensively. Risks were highlighted which included but were not limited to infection, pain, local site swelling, and chance that injection may not be effective. Patient was also counseled regarding glucose elevation days after receiving CSI and to be mindful of diet and check sugars daily. Patient agreeable to proceed with CSI after counseling.     US images and video clip were saved to the [employer] machine            \

## 2024-03-25 ENCOUNTER — TELEPHONE (OUTPATIENT)
Dept: OBGYN CLINIC | Facility: CLINIC | Age: 64
End: 2024-03-25

## 2024-03-25 NOTE — TELEPHONE ENCOUNTER
"Call to this patient To see how he is doing after his visit to the office on Saturday 03/23/2024 in which he fell in the parking lot, on to his Right side. He stated I have no bruising in the Right arm or shoulder, it's a little sore since the fall but I expect that. My Hip is fine no bruising, the pain in the hip has subsided since the injection.\"  He was advised to give the office a call if the pain in the shoulder does not subside in a few days. He understood and will call if needed.   "

## 2024-04-03 DIAGNOSIS — G47.00 INSOMNIA, UNSPECIFIED TYPE: ICD-10-CM

## 2024-04-03 DIAGNOSIS — F32.A DEPRESSION, UNSPECIFIED DEPRESSION TYPE: ICD-10-CM

## 2024-04-03 DIAGNOSIS — E78.2 MIXED HYPERLIPIDEMIA: ICD-10-CM

## 2024-04-03 RX ORDER — TRAZODONE HYDROCHLORIDE 50 MG/1
TABLET ORAL
Qty: 90 TABLET | Refills: 1 | Status: SHIPPED | OUTPATIENT
Start: 2024-04-03

## 2024-04-03 RX ORDER — FENOFIBRATE 145 MG/1
TABLET, COATED ORAL
Qty: 90 TABLET | Refills: 1 | Status: SHIPPED | OUTPATIENT
Start: 2024-04-03

## 2024-04-03 RX ORDER — ESCITALOPRAM OXALATE 10 MG/1
TABLET ORAL
Qty: 90 TABLET | Refills: 1 | Status: SHIPPED | OUTPATIENT
Start: 2024-04-03

## 2024-05-09 DIAGNOSIS — I25.119 ATHEROSCLEROSIS OF NATIVE CORONARY ARTERY OF NATIVE HEART WITH ANGINA PECTORIS (HCC): ICD-10-CM

## 2024-05-09 DIAGNOSIS — E78.2 MIXED HYPERLIPIDEMIA: ICD-10-CM

## 2024-05-09 DIAGNOSIS — I25.10 CORONARY ARTERY DISEASE INVOLVING NATIVE HEART WITHOUT ANGINA PECTORIS, UNSPECIFIED VESSEL OR LESION TYPE: ICD-10-CM

## 2024-05-09 RX ORDER — METOPROLOL SUCCINATE 100 MG/1
TABLET, EXTENDED RELEASE ORAL
Qty: 90 TABLET | Refills: 1 | Status: SHIPPED | OUTPATIENT
Start: 2024-05-09

## 2024-05-09 RX ORDER — ISOSORBIDE MONONITRATE 60 MG/1
TABLET, EXTENDED RELEASE ORAL
Qty: 90 TABLET | Refills: 1 | Status: SHIPPED | OUTPATIENT
Start: 2024-05-09

## 2024-05-09 RX ORDER — PRAVASTATIN SODIUM 40 MG
TABLET ORAL
Qty: 90 TABLET | Refills: 1 | Status: SHIPPED | OUTPATIENT
Start: 2024-05-09

## 2024-06-12 DIAGNOSIS — I25.10 CORONARY ARTERY DISEASE INVOLVING NATIVE CORONARY ARTERY OF NATIVE HEART WITHOUT ANGINA PECTORIS: ICD-10-CM

## 2024-06-12 RX ORDER — CLOPIDOGREL BISULFATE 75 MG/1
75 TABLET ORAL DAILY
Qty: 30 TABLET | Refills: 0 | Status: SHIPPED | OUTPATIENT
Start: 2024-06-12

## 2024-06-12 NOTE — TELEPHONE ENCOUNTER
Refill must be reviewed and completed by the office or provider. The refill is unable to be approved or denied by the medication management team.    Courtesy refill previously given.   Patient needs cbc per protocol

## 2024-07-05 DIAGNOSIS — I25.10 CORONARY ARTERY DISEASE INVOLVING NATIVE CORONARY ARTERY OF NATIVE HEART WITHOUT ANGINA PECTORIS: ICD-10-CM

## 2024-07-05 RX ORDER — CLOPIDOGREL BISULFATE 75 MG/1
75 TABLET ORAL DAILY
Qty: 30 TABLET | Refills: 0 | Status: SHIPPED | OUTPATIENT
Start: 2024-07-05

## 2024-07-28 DIAGNOSIS — I25.10 CORONARY ARTERY DISEASE INVOLVING NATIVE CORONARY ARTERY OF NATIVE HEART WITHOUT ANGINA PECTORIS: ICD-10-CM

## 2024-07-28 RX ORDER — CLOPIDOGREL BISULFATE 75 MG/1
75 TABLET ORAL DAILY
Qty: 30 TABLET | Refills: 5 | Status: SHIPPED | OUTPATIENT
Start: 2024-07-28

## 2024-08-07 ENCOUNTER — TELEPHONE (OUTPATIENT)
Dept: FAMILY MEDICINE CLINIC | Facility: CLINIC | Age: 64
End: 2024-08-07

## 2024-08-15 ENCOUNTER — OFFICE VISIT (OUTPATIENT)
Dept: FAMILY MEDICINE CLINIC | Facility: CLINIC | Age: 64
End: 2024-08-15
Payer: COMMERCIAL

## 2024-08-15 VITALS
WEIGHT: 167 LBS | RESPIRATION RATE: 16 BRPM | SYSTOLIC BLOOD PRESSURE: 132 MMHG | HEIGHT: 66 IN | DIASTOLIC BLOOD PRESSURE: 72 MMHG | TEMPERATURE: 97.1 F | BODY MASS INDEX: 26.84 KG/M2 | HEART RATE: 50 BPM | OXYGEN SATURATION: 97 %

## 2024-08-15 DIAGNOSIS — I10 ESSENTIAL HYPERTENSION: ICD-10-CM

## 2024-08-15 DIAGNOSIS — F33.0 DEPRESSION, MAJOR, RECURRENT, MILD (HCC): ICD-10-CM

## 2024-08-15 DIAGNOSIS — E78.6 LOW HDL (UNDER 40): ICD-10-CM

## 2024-08-15 DIAGNOSIS — I25.708 CORONARY ARTERY DISEASE OF BYPASS GRAFT OF NATIVE HEART WITH STABLE ANGINA PECTORIS (HCC): ICD-10-CM

## 2024-08-15 DIAGNOSIS — M51.36 DDD (DEGENERATIVE DISC DISEASE), LUMBAR: ICD-10-CM

## 2024-08-15 DIAGNOSIS — F41.9 ANXIETY: ICD-10-CM

## 2024-08-15 DIAGNOSIS — G47.00 INSOMNIA, UNSPECIFIED TYPE: ICD-10-CM

## 2024-08-15 DIAGNOSIS — R73.03 PREDIABETES: ICD-10-CM

## 2024-08-15 DIAGNOSIS — K21.9 GASTROESOPHAGEAL REFLUX DISEASE WITHOUT ESOPHAGITIS: ICD-10-CM

## 2024-08-15 DIAGNOSIS — E78.2 MIXED HYPERLIPIDEMIA: ICD-10-CM

## 2024-08-15 DIAGNOSIS — Z12.11 SCREEN FOR COLON CANCER: Primary | ICD-10-CM

## 2024-08-15 PROBLEM — M51.369 DDD (DEGENERATIVE DISC DISEASE), LUMBAR: Status: ACTIVE | Noted: 2024-08-15

## 2024-08-15 PROCEDURE — 99214 OFFICE O/P EST MOD 30 MIN: CPT | Performed by: PHYSICIAN ASSISTANT

## 2024-08-15 RX ORDER — UBIDECARENONE 200 MG
200 CAPSULE ORAL DAILY
Qty: 100 CAPSULE | Refills: 3 | Status: SHIPPED | OUTPATIENT
Start: 2024-08-15

## 2024-08-15 NOTE — PROGRESS NOTES
Assessment/Plan:     Diagnoses and all orders for this visit:    Screen for colon cancer  -     Occult Blood, Fecal Immunochemical; Future    Coronary artery disease of bypass graft of native heart with stable angina pectoris (HCC)  Comments:  Patient is currently stable continue current regimen.  Follow-up with cardiology as directed  Orders:  -     CBC and differential  -     Comprehensive metabolic panel    Essential hypertension  Comments:  Blood pressure is at goal continue current regimen  Orders:  -     CBC and differential  -     Comprehensive metabolic panel    Prediabetes  Comments:  Continue low-carb low sugar diet and exercise    Mixed hyperlipidemia  Comments:  Continue statin therapy and fenofibrate  Orders:  -     Lipid panel  -     Coenzyme Q10 200 MG capsule; Take 1 capsule (200 mg total) by mouth daily    Low HDL (under 40)    Gastroesophageal reflux disease without esophagitis  Comments:  stAble off medication    Depression, major, recurrent, mild (HCC)  Comments:  In remission on Lexapro 10 mg    Anxiety  Comments:  Controlled with Lexapro 10 mg    Insomnia, unspecified type  Comments:  Manageable with trazodone at bedtime    DDD (degenerative disc disease), lumbar          Subjective:      Patient ID: Benigno Palma is a 64 y.o. male.    Patient presents in the office for follow-up chronic conditions.  Patient has known coronary artery disease history of CABG and stent placement.  Followed by cardiology.  Is on aspirin and Plavix.  He also takes Imdur 60 mg a day.  Pretension is well-controlled as well.  He is on metoprolol  mg patient denies any angina at this time.  No signs of congestive heart failure.  Dyslipidemia patient is on pravastatin 40 mg and fenofibrate 145.  She needs to take coenzyme every 10 200 mg a day wise he will have myalgias.  Try to obtain this medication as a prescription.  Patient has a history of prediabetes.  Currently diet controlled.  He does have a family  history of diabetes.  For depression and anxiety he is on Lexapro 10 mg.  For insomnia he takes trazodone 1:59 at night with good results.  Has degenerative disc disease of his lumbar spine.  He has chronic pain.  Been to physical therapy.        The following portions of the patient's history were reviewed and updated as appropriate: He   Patient Active Problem List    Diagnosis Date Noted    DDD (degenerative disc disease), lumbar 08/15/2024    History of coronary artery stent placement 05/04/2021    Unstable angina (HCC) 11/12/2020    Essential hypertension 11/12/2020    Benign prostatic hyperplasia with urinary hesitancy 02/07/2020    Prediabetes 11/06/2019    Hx of CABG 03/09/2018    Low HDL (under 40) 11/17/2017    Depression, major, recurrent, mild (HCC) 08/21/2017    Obstructive sleep apnea 04/14/2014    Mixed hyperlipidemia 08/22/2013    Anxiety 03/01/2012    Coronary artery disease of bypass graft of native heart with stable angina pectoris (HCC) 03/01/2012    Esophageal reflux 03/01/2012    Insomnia 03/01/2012     Current Outpatient Medications   Medication Sig Dispense Refill    aspirin 81 mg chewable tablet Chew 81 mg daily      clopidogrel (PLAVIX) 75 mg tablet TAKE 1 TABLET DAILY 30 tablet 5    Coenzyme Q10 200 MG capsule Take 1 capsule (200 mg total) by mouth daily 100 capsule 3    escitalopram (LEXAPRO) 10 mg tablet TAKE 1 TABLET DAILY 90 tablet 1    fenofibrate (TRICOR) 145 mg tablet TAKE 1 TABLET DAILY 90 tablet 1    isosorbide mononitrate (IMDUR) 60 mg 24 hr tablet TAKE 1 TABLET DAILY 90 tablet 1    metoprolol succinate (TOPROL-XL) 100 mg 24 hr tablet TAKE 1 TABLET DAILY 90 tablet 1    nitroglycerin (NITROSTAT) 0.4 mg SL tablet Place 1 tablet (0.4 mg total) under the tongue every 5 (five) minutes as needed for chest pain 30 tablet 5    Omega-3 Fatty Acids (FISH OIL) 645 MG CAPS Take by mouth Daily      pravastatin (PRAVACHOL) 40 mg tablet TAKE 1 TABLET AT BEDTIME 90 tablet 1    traZODone  (DESYREL) 50 mg tablet TAKE 1 TABLET DAILY AT     BEDTIME AS NEEDED FOR SLEEP 90 tablet 1     No current facility-administered medications for this visit.     He is allergic to iodinated contrast media, meloxicam, and omnipaque [iohexol]..    Review of Systems   Constitutional:  Negative for activity change, appetite change, chills, fatigue and fever.   HENT:  Negative for ear pain and sore throat.    Eyes:  Negative for visual disturbance.   Respiratory:  Negative for cough and shortness of breath.    Cardiovascular:  Negative for chest pain, palpitations and leg swelling.   Gastrointestinal:  Negative for abdominal pain, blood in stool, constipation, diarrhea and nausea.   Genitourinary:  Negative for difficulty urinating.        Occasional  post  void  dribbling   Musculoskeletal:  Positive for back pain. Negative for arthralgias and myalgias.   Skin:  Negative for rash.   Neurological:  Negative for dizziness, syncope and headaches.   Psychiatric/Behavioral:  Negative for self-injury, sleep disturbance and suicidal ideas. The patient is not nervous/anxious.          Objective:        Physical Exam  Vitals and nursing note reviewed.   Constitutional:       General: He is not in acute distress.     Appearance: Normal appearance. He is well-developed. He is not ill-appearing.   HENT:      Head: Normocephalic.      Comments: Surgical  scar  right parietal  scalp.  Metal  plate.      Right Ear: Tympanic membrane, ear canal and external ear normal.      Left Ear: Tympanic membrane, ear canal and external ear normal.   Eyes:      Conjunctiva/sclera: Conjunctivae normal.      Pupils: Pupils are equal, round, and reactive to light.   Neck:      Thyroid: No thyromegaly.      Vascular: No carotid bruit.   Cardiovascular:      Rate and Rhythm: Regular rhythm. Bradycardia present.      Heart sounds: Normal heart sounds. No murmur heard.  Pulmonary:      Effort: Pulmonary effort is normal.      Breath sounds: Normal breath  sounds. No wheezing.   Abdominal:      General: Bowel sounds are normal.      Palpations: Abdomen is soft. There is no mass.      Tenderness: There is no abdominal tenderness.   Musculoskeletal:      Right lower leg: No edema.      Left lower leg: No edema.   Lymphadenopathy:      Cervical: No cervical adenopathy.   Skin:     General: Skin is warm and dry.   Neurological:      General: No focal deficit present.      Mental Status: He is alert and oriented to person, place, and time.   Psychiatric:         Mood and Affect: Mood normal.         Behavior: Behavior normal.         Thought Content: Thought content normal.         Judgment: Judgment normal.

## 2024-08-27 DIAGNOSIS — E78.2 MIXED HYPERLIPIDEMIA: ICD-10-CM

## 2024-08-27 RX ORDER — UBIDECARENONE 200 MG
200 CAPSULE ORAL DAILY
Qty: 90 CAPSULE | Refills: 1 | Status: SHIPPED | OUTPATIENT
Start: 2024-08-27

## 2024-08-27 NOTE — TELEPHONE ENCOUNTER
Reason for call:   [x] Refill   [] Prior Auth  [x] Other: Not a duplicate request- send to alternate pharmacy [facility]    Office:   [x] PCP/Provider -   [] Specialty/Provider -         Does the patient have enough for 3 days?   [] Yes   [] No - Send as HP to POD

## 2024-09-20 ENCOUNTER — TELEPHONE (OUTPATIENT)
Age: 64
End: 2024-09-20

## 2024-09-20 NOTE — TELEPHONE ENCOUNTER
Pt called in having some questions regarding the pneumonia vaccine. Pt would like a call back from his PCP or nurse, if possible, to discuss this vaccine.    Please advise.

## 2024-09-22 DIAGNOSIS — E78.2 MIXED HYPERLIPIDEMIA: ICD-10-CM

## 2024-09-22 DIAGNOSIS — G47.00 INSOMNIA, UNSPECIFIED TYPE: ICD-10-CM

## 2024-09-22 DIAGNOSIS — F32.A DEPRESSION, UNSPECIFIED DEPRESSION TYPE: ICD-10-CM

## 2024-09-24 RX ORDER — FENOFIBRATE 145 MG/1
TABLET, COATED ORAL
Qty: 90 TABLET | Refills: 1 | Status: SHIPPED | OUTPATIENT
Start: 2024-09-24

## 2024-09-24 RX ORDER — TRAZODONE HYDROCHLORIDE 50 MG/1
TABLET, FILM COATED ORAL
Qty: 90 TABLET | Refills: 1 | Status: SHIPPED | OUTPATIENT
Start: 2024-09-24

## 2024-09-24 RX ORDER — ESCITALOPRAM OXALATE 10 MG/1
TABLET ORAL
Qty: 90 TABLET | Refills: 1 | Status: SHIPPED | OUTPATIENT
Start: 2024-09-24

## 2024-10-30 DIAGNOSIS — I25.10 CORONARY ARTERY DISEASE INVOLVING NATIVE HEART WITHOUT ANGINA PECTORIS, UNSPECIFIED VESSEL OR LESION TYPE: ICD-10-CM

## 2024-10-30 DIAGNOSIS — I25.119 ATHEROSCLEROSIS OF NATIVE CORONARY ARTERY OF NATIVE HEART WITH ANGINA PECTORIS (HCC): ICD-10-CM

## 2024-10-30 DIAGNOSIS — E78.2 MIXED HYPERLIPIDEMIA: ICD-10-CM

## 2024-10-31 RX ORDER — METOPROLOL SUCCINATE 100 MG/1
TABLET, EXTENDED RELEASE ORAL
Qty: 90 TABLET | Refills: 1 | Status: SHIPPED | OUTPATIENT
Start: 2024-10-31

## 2024-10-31 RX ORDER — ISOSORBIDE MONONITRATE 60 MG/1
TABLET, EXTENDED RELEASE ORAL
Qty: 90 TABLET | Refills: 1 | Status: SHIPPED | OUTPATIENT
Start: 2024-10-31

## 2024-10-31 RX ORDER — PRAVASTATIN SODIUM 40 MG
TABLET ORAL
Qty: 90 TABLET | Refills: 1 | Status: SHIPPED | OUTPATIENT
Start: 2024-10-31

## 2025-01-27 DIAGNOSIS — I25.10 CORONARY ARTERY DISEASE INVOLVING NATIVE CORONARY ARTERY OF NATIVE HEART WITHOUT ANGINA PECTORIS: ICD-10-CM

## 2025-01-27 RX ORDER — CLOPIDOGREL BISULFATE 75 MG/1
75 TABLET ORAL DAILY
Qty: 90 TABLET | Refills: 0 | Status: SHIPPED | OUTPATIENT
Start: 2025-01-27

## 2025-02-05 ENCOUNTER — TELEPHONE (OUTPATIENT)
Dept: FAMILY MEDICINE CLINIC | Facility: CLINIC | Age: 65
End: 2025-02-05

## 2025-02-05 NOTE — TELEPHONE ENCOUNTER
----- Message from Nilsa TOLEDO sent at 2/5/2025 11:11 AM EST -----  Please call patient and remind of lab and FIT orders from August.

## 2025-02-08 ENCOUNTER — APPOINTMENT (OUTPATIENT)
Dept: LAB | Facility: HOSPITAL | Age: 65
End: 2025-02-08
Payer: COMMERCIAL

## 2025-02-08 DIAGNOSIS — Z12.11 SCREEN FOR COLON CANCER: ICD-10-CM

## 2025-02-08 LAB
ALBUMIN SERPL BCG-MCNC: 4.8 G/DL (ref 3.5–5)
ALP SERPL-CCNC: 45 U/L (ref 34–104)
ALT SERPL W P-5'-P-CCNC: 13 U/L (ref 7–52)
ANION GAP SERPL CALCULATED.3IONS-SCNC: 6 MMOL/L (ref 4–13)
AST SERPL W P-5'-P-CCNC: 20 U/L (ref 13–39)
BASOPHILS # BLD AUTO: 0.05 THOUSANDS/ΜL (ref 0–0.1)
BASOPHILS NFR BLD AUTO: 1 % (ref 0–1)
BILIRUB SERPL-MCNC: 0.74 MG/DL (ref 0.2–1)
BUN SERPL-MCNC: 18 MG/DL (ref 5–25)
CALCIUM SERPL-MCNC: 9.5 MG/DL (ref 8.4–10.2)
CHLORIDE SERPL-SCNC: 104 MMOL/L (ref 96–108)
CHOLEST SERPL-MCNC: 100 MG/DL (ref ?–200)
CO2 SERPL-SCNC: 29 MMOL/L (ref 21–32)
CREAT SERPL-MCNC: 0.86 MG/DL (ref 0.6–1.3)
EOSINOPHIL # BLD AUTO: 0.18 THOUSAND/ΜL (ref 0–0.61)
EOSINOPHIL NFR BLD AUTO: 3 % (ref 0–6)
ERYTHROCYTE [DISTWIDTH] IN BLOOD BY AUTOMATED COUNT: 12.2 % (ref 11.6–15.1)
GFR SERPL CREATININE-BSD FRML MDRD: 92 ML/MIN/1.73SQ M
GLUCOSE P FAST SERPL-MCNC: 96 MG/DL (ref 65–99)
HCT VFR BLD AUTO: 42.1 % (ref 36.5–49.3)
HDLC SERPL-MCNC: 32 MG/DL
HGB BLD-MCNC: 14.1 G/DL (ref 12–17)
IMM GRANULOCYTES # BLD AUTO: 0.04 THOUSAND/UL (ref 0–0.2)
IMM GRANULOCYTES NFR BLD AUTO: 1 % (ref 0–2)
LDLC SERPL CALC-MCNC: 50 MG/DL (ref 0–100)
LYMPHOCYTES # BLD AUTO: 1.61 THOUSANDS/ΜL (ref 0.6–4.47)
LYMPHOCYTES NFR BLD AUTO: 25 % (ref 14–44)
MCH RBC QN AUTO: 29 PG (ref 26.8–34.3)
MCHC RBC AUTO-ENTMCNC: 33.5 G/DL (ref 31.4–37.4)
MCV RBC AUTO: 86 FL (ref 82–98)
MONOCYTES # BLD AUTO: 0.66 THOUSAND/ΜL (ref 0.17–1.22)
MONOCYTES NFR BLD AUTO: 10 % (ref 4–12)
NEUTROPHILS # BLD AUTO: 3.82 THOUSANDS/ΜL (ref 1.85–7.62)
NEUTS SEG NFR BLD AUTO: 60 % (ref 43–75)
NONHDLC SERPL-MCNC: 68 MG/DL
NRBC BLD AUTO-RTO: 0 /100 WBCS
PLATELET # BLD AUTO: 228 THOUSANDS/UL (ref 149–390)
PMV BLD AUTO: 9 FL (ref 8.9–12.7)
POTASSIUM SERPL-SCNC: 4.8 MMOL/L (ref 3.5–5.3)
PROT SERPL-MCNC: 7.1 G/DL (ref 6.4–8.4)
RBC # BLD AUTO: 4.87 MILLION/UL (ref 3.88–5.62)
SODIUM SERPL-SCNC: 139 MMOL/L (ref 135–147)
TRIGL SERPL-MCNC: 90 MG/DL (ref ?–150)
WBC # BLD AUTO: 6.36 THOUSAND/UL (ref 4.31–10.16)

## 2025-02-08 PROCEDURE — G0328 FECAL BLOOD SCRN IMMUNOASSAY: HCPCS

## 2025-02-09 LAB — HEMOCCULT STL QL IA: NEGATIVE

## 2025-02-11 ENCOUNTER — RESULTS FOLLOW-UP (OUTPATIENT)
Dept: FAMILY MEDICINE CLINIC | Facility: CLINIC | Age: 65
End: 2025-02-11

## 2025-02-14 ENCOUNTER — HOSPITAL ENCOUNTER (EMERGENCY)
Facility: HOSPITAL | Age: 65
End: 2025-02-14
Attending: EMERGENCY MEDICINE
Payer: COMMERCIAL

## 2025-02-14 DIAGNOSIS — I46.9 CARDIAC ARREST (HCC): Primary | ICD-10-CM

## 2025-02-14 LAB
CA-I BLD-SCNC: 1.54 MMOL/L (ref 1.12–1.32)
CA-I BLD-SCNC: 1.72 MMOL/L (ref 1.12–1.32)
GLUCOSE SERPL-MCNC: 218 MG/DL (ref 65–140)
GLUCOSE SERPL-MCNC: 246 MG/DL (ref 65–140)
GLUCOSE SERPL-MCNC: 285 MG/DL (ref 65–140)
HCT VFR BLD CALC: 41 % (ref 36.5–49.3)
HGB BLDA-MCNC: 13.9 G/DL (ref 12–17)
PCO2 BLD: >103 MM HG (ref 42–50)
PH BLD: 6.75 [PH] (ref 7.3–7.4)
PO2 BLD: 22 MM HG (ref 35–45)
PO2 BLD: 23 MM HG (ref 35–45)
POTASSIUM BLD-SCNC: 4.3 MMOL/L (ref 3.5–5.3)
SODIUM BLD-SCNC: 139 MMOL/L (ref 136–145)
SODIUM BLD-SCNC: >175 MMOL/L (ref 136–145)
SPECIMEN SOURCE: ABNORMAL
SPECIMEN SOURCE: ABNORMAL

## 2025-02-14 PROCEDURE — 92950 HEART/LUNG RESUSCITATION CPR: CPT | Performed by: EMERGENCY MEDICINE

## 2025-02-14 PROCEDURE — 82803 BLOOD GASES ANY COMBINATION: CPT

## 2025-02-14 PROCEDURE — 99285 EMERGENCY DEPT VISIT HI MDM: CPT

## 2025-02-14 PROCEDURE — 99285 EMERGENCY DEPT VISIT HI MDM: CPT | Performed by: EMERGENCY MEDICINE

## 2025-02-14 PROCEDURE — 36556 INSERT NON-TUNNEL CV CATH: CPT | Performed by: EMERGENCY MEDICINE

## 2025-02-14 PROCEDURE — 92950 HEART/LUNG RESUSCITATION CPR: CPT

## 2025-02-14 PROCEDURE — 85014 HEMATOCRIT: CPT

## 2025-02-14 PROCEDURE — 84132 ASSAY OF SERUM POTASSIUM: CPT

## 2025-02-14 PROCEDURE — 82948 REAGENT STRIP/BLOOD GLUCOSE: CPT

## 2025-02-14 PROCEDURE — 82947 ASSAY GLUCOSE BLOOD QUANT: CPT

## 2025-02-14 PROCEDURE — 84295 ASSAY OF SERUM SODIUM: CPT

## 2025-02-14 PROCEDURE — 82330 ASSAY OF CALCIUM: CPT

## 2025-02-14 RX ORDER — EPINEPHRINE 0.1 MG/ML
INJECTION INTRAVENOUS CODE/TRAUMA/SEDATION MEDICATION
Status: COMPLETED | OUTPATIENT
Start: 2025-02-14 | End: 2025-02-14

## 2025-02-14 RX ORDER — AMIODARONE HYDROCHLORIDE 150 MG/3ML
INJECTION, SOLUTION INTRAVENOUS CODE/TRAUMA/SEDATION MEDICATION
Status: COMPLETED | OUTPATIENT
Start: 2025-02-14 | End: 2025-02-14

## 2025-02-14 RX ORDER — EPINEPHRINE 0.1 MG/ML
INJECTION INTRAVENOUS
Status: COMPLETED
Start: 2025-02-14 | End: 2025-02-14

## 2025-02-14 RX ORDER — AMIODARONE HYDROCHLORIDE 150 MG/3ML
INJECTION, SOLUTION INTRAVENOUS
Status: DISCONTINUED
Start: 2025-02-14 | End: 2025-02-14 | Stop reason: HOSPADM

## 2025-02-14 RX ORDER — CALCIUM CHLORIDE 100 MG/ML
SYRINGE (ML) INTRAVENOUS CODE/TRAUMA/SEDATION MEDICATION
Status: COMPLETED | OUTPATIENT
Start: 2025-02-14 | End: 2025-02-14

## 2025-02-14 RX ORDER — SODIUM BICARBONATE 84 MG/ML
INJECTION, SOLUTION INTRAVENOUS CODE/TRAUMA/SEDATION MEDICATION
Status: COMPLETED | OUTPATIENT
Start: 2025-02-14 | End: 2025-02-14

## 2025-02-14 RX ADMIN — CALCIUM CHLORIDE 1 G: 100 INJECTION PARENTERAL at 13:19

## 2025-02-14 RX ADMIN — AMIODARONE HYDROCHLORIDE 150 MG: 50 INJECTION, SOLUTION INTRAVENOUS at 13:24

## 2025-02-14 RX ADMIN — EPINEPHRINE 1 MG: 0.1 INJECTION INTRAVENOUS at 13:21

## 2025-02-14 RX ADMIN — EPINEPHRINE 1 MG: 0.1 INJECTION INTRAVENOUS at 13:29

## 2025-02-14 RX ADMIN — CALCIUM CHLORIDE 1 G: 100 INJECTION PARENTERAL at 13:25

## 2025-02-14 RX ADMIN — SODIUM BICARBONATE 50 MEQ: 84 INJECTION, SOLUTION INTRAVENOUS at 13:20

## 2025-02-14 RX ADMIN — AMIODARONE HYDROCHLORIDE 300 MG: 50 INJECTION, SOLUTION INTRAVENOUS at 13:25

## 2025-02-14 RX ADMIN — AMIODARONE HYDROCHLORIDE 150 MG: 50 INJECTION, SOLUTION INTRAVENOUS at 13:28

## 2025-02-14 NOTE — ED PROVIDER NOTES
Time reflects when diagnosis was documented in both MDM as applicable and the Disposition within this note       Time User Action Codes Description Comment    2025  1:43 PM Betty Lamb Add [I46.9] Cardiac arrest (HCC)           ED Disposition       ED Disposition       Condition   --    Date/Time     1:43 PM    Comment   --             Assessment & Plan       Medical Decision Making  Patient in cardiac arrest- ACLS protocol followed and patient found to be in vfib arrest so defibrillation at 200J were delivered. Multiple rounds of epi as well as calcium, bicarb, and amiodarone given. Pupils fixed, dilated and unresponsive. Patient down time over 50 mins without ROSC. Despite multiple attempts at CPR, epi, shocks, amiodarone, and calcium/ bicarb- unable to obtain ROSC. Time of Death 1334.     Patient's daughter, Imelda Palma who is in as patient's contact- was notified by me.     Risk  Prescription drug management.             Medications   amiodarone 150 mg/3 mL injection **ADS Override Pull** (has no administration in time range)   calcium chloride 1 g/10 mL injection (1 g Intravenous Given 25 1325)   sodium bicarbonate 50 mEq/50 mL injection (50 mEq Intravenous Given 25 1320)   EPINEPHrine (ADRENALIN) injection (1 mg Intravenous Given 25 1329)   amiodarone 150 mg/3 mL injection (150 mg Intravenous Given 25 1328)   EPINEPHrine (ADRENALIN) 0.1 mg/mL injection **ADS Override Pull** (  Given to EMS 25 1328)       ED Risk Strat Scores                                              History of Present Illness       Chief Complaint   Patient presents with    Cardiac Arrest     Pt arrived via ems with [facility] CPR in motion  pt was found to be unresponsive per witness 35 min to an hour pt was unresponsive.       Past Medical History:   Diagnosis Date    Anxiety     Coronary artery disease     Depression     Hyperlipidemia     Nephrolithiasis     Rotator cuff tear      Sleep apnea     Subdural hemorrhage-concussion (HCC)       Past Surgical History:   Procedure Laterality Date    BRAIN SURGERY      CARDIAC CATHETERIZATION      CORONARY ARTERY BYPASS GRAFT      HERNIA REPAIR      INGUINAL HERNIA REPAIR      OTHER SURGICAL HISTORY      cardiac cath procedure outcome - successful     SHOULDER SURGERY      arthroscopy     TONSILLECTOMY      VASECTOMY        Family History   Problem Relation Age of Onset    Diabetes Mother         mellitus     Depression Mother     Stroke Father     Other Father         other lymphatic and hematopoietic neoplasms     Hypertension Family     Cancer Family     Arthritis Family       Social History     Tobacco Use    Smoking status: Former     Current packs/day: 0.00     Average packs/day: 1 pack/day for 39.0 years (39.0 ttl pk-yrs)     Types: Cigarettes     Start date:      Quit date:      Years since quittin.1     Passive exposure: Past    Smokeless tobacco: Never    Tobacco comments:     quit about 8 1/2 years ago    Vaping Use    Vaping status: Never Used   Substance Use Topics    Alcohol use: Yes     Comment: rare    Drug use: Yes     Types: Marijuana     Comment: Few times a week      E-Cigarette/Vaping    E-Cigarette Use Never User       E-Cigarette/Vaping Substances    Nicotine No     THC No     CBD No     Flavoring No     Other No     Unknown No       I have reviewed and agree with the history as documented.     63 y/o male presents to the ED in cardiac arrest. EMS reports that he was found outside a shop with agonal respirations. He was witnessed by bystanders to go into cardiac arrest and CPR was started. EMS continued and patient in persistent asystole with 4 rounds epi. Airway placed by EMS. No known medical hx. No family present at time of arrival.       History provided by:  EMS personnel  History limited by:  Patient unresponsive      Review of Systems   Unable to perform ROS: Patient unresponsive           Objective       ED  Triage Vitals   Temp Pulse BP Resp SpO2 Patient Position - Orthostatic VS   -- -- -- -- -- --      Temp src Heart Rate Source BP Location FiO2 (%) Pain Score    -- -- -- -- --      Vitals    None         Physical Exam  Constitutional:       Comments: Unresponsive    HENT:      Head: Normocephalic and atraumatic.   Eyes:      Comments: Fixed, dilated and unresponsive    Neck:      Comments: No signs of trauma   Cardiovascular:      Comments: Absent pulses - CPR in progress   Pulmonary:      Comments: Equal assisted breath sounds   Abdominal:      General: There is no distension.      Comments: No signs of trauma    Musculoskeletal:      Comments: No signs of trauma, no deformity    Skin:     General: Skin is warm and dry.   Neurological:      Mental Status: He is unresponsive.      GCS: GCS eye subscore is 1. GCS verbal subscore is 1. GCS motor subscore is 1.         Results Reviewed       Procedure Component Value Units Date/Time    POCT Blood Gas (CG8+) [[id number]]  (Abnormal) Collected: 02/14/25 1330    Lab Status: Final result Specimen: Venous Updated: 02/14/25 1542     ph, Lamin ISTAT 6.745     pCO2, Lamin i-STAT >103.0 mm HG      pO2, Lamin i-STAT 22.0 mm HG      BE, i-STAT --     HCO3, Lamin i-STAT --     CO2, i-STAT --     O2 Sat, i-STAT --     SODIUM, I-STAT 139 mmol/l      Potassium, i-STAT 4.3 mmol/L      Calcium, Ionized i-STAT 1.72 mmol/L      Hct, i-STAT 41 %      Hgb, i-STAT 13.9 g/dl      Glucose, i-STAT 285 mg/dl      Specimen Type VENOUS    POCT Blood Gas (CG8+) [[id number]]  (Abnormal) Collected: 02/14/25 1324    Lab Status: Final result Specimen: Venous Updated: 02/14/25 1542     ph, Lamin ISTAT --     pCO2, Lamin i-STAT --     pO2, Lamin i-STAT 23.0 mm HG      BE, i-STAT --     HCO3, Lamin i-STAT --     CO2, i-STAT --     O2 Sat, i-STAT --     SODIUM, I-STAT >175 mmol/l      Potassium, i-STAT --     Calcium, Ionized i-STAT 1.54 mmol/L      Hct, i-STAT --     Hgb, i-STAT --     Glucose, i-STAT 246 mg/dl       Specimen Type VENOUS    Fingerstick Glucose (POCT) [[id number]]  (Abnormal) Collected: 02/14/25 1323    Lab Status: Final result Specimen: Blood Updated: 02/14/25 1324     POC Glucose 218 mg/dl             No orders to display       Central Line    Date/Time: 2/14/2025 1:17 PM    Performed by: Scooby Sue PA-C  Authorized by: Scooby Sue PA-C    Consent:     Consent obtained:  Emergent situation  Universal protocol:     Patient identity confirmed:  Provided demographic data  Pre-procedure details:     Hand hygiene: Hand hygiene performed prior to insertion      Skin preparation:  2% chlorhexidine  Indications:     Central line indications: medications requiring central line and no peripheral vascular access    Procedure details:     Location:  Right femoral    Vessel type: vein      Laterality:  Right    Approach: percutaneous technique used      Catheter size:  7 Fr    Landmarks identified: yes      Manometry confirmation: no      Number of attempts:  1    Successful placement: yes      Catheter tip vessel location: iliac vein    Post-procedure details:     Post-procedure:  Dressing applied and line sutured    Assessment:  Blood return through all ports and free fluid flow    Post-procedure complications: none      Patient tolerance of procedure:  Tolerated well, no immediate complications      ED Medication and Procedure Management   Prior to Admission Medications   Prescriptions Last Dose Informant Patient Reported? Taking?   Coenzyme Q10 200 MG capsule   No No   Sig: Take 1 capsule (200 mg total) by mouth daily   Omega-3 Fatty Acids (FISH OIL) 645 MG CAPS  Self Yes No   Sig: Take by mouth Daily   aspirin 81 mg chewable tablet  Self Yes No   Sig: Chew 81 mg daily   clopidogrel (PLAVIX) 75 mg tablet   No No   Sig: TAKE 1 TABLET DAILY   escitalopram (LEXAPRO) 10 mg tablet   No No   Sig: TAKE 1 TABLET DAILY   fenofibrate (TRICOR) 145 mg tablet   No No   Sig: TAKE 1 TABLET DAILY   isosorbide  mononitrate (IMDUR) 60 mg 24 hr tablet   No No   Sig: TAKE 1 TABLET DAILY   metoprolol succinate (TOPROL-XL) 100 mg 24 hr tablet   No No   Sig: TAKE 1 TABLET DAILY   nitroglycerin (NITROSTAT) 0.4 mg SL tablet  Self No No   Sig: Place 1 tablet (0.4 mg total) under the tongue every 5 (five) minutes as needed for chest pain   pravastatin (PRAVACHOL) 40 mg tablet   No No   Sig: TAKE 1 TABLET AT BEDTIME   traZODone (DESYREL) 50 mg tablet   No No   Sig: TAKE 1 TABLET DAILY AT     BEDTIME AS NEEDED FOR SLEEP      Facility-Administered Medications: None     Patient's Medications   Discharge Prescriptions    No medications on file     No discharge procedures on file.  ED SEPSIS DOCUMENTATION   Time reflects when diagnosis was documented in both MDM as applicable and the Disposition within this note       Time User Action Codes Description Comment    2/14/2025  1:43 PM Betty Lamb Add [I46.9] Cardiac arrest (HCC)                  Scooby Sue PA-C  02/14/25 1343       Betty Lamb DO  02/14/25 1610

## 2025-04-03 NOTE — ASSESSMENT & PLAN NOTE
· Status post PTCA of superior/inferior ramus branch with drug-eluting stent in December of 2020  · Follow up with outpatient Cardiology  · Continue with dual antiplatelet therapy/statin  dental pain/injury